# Patient Record
Sex: FEMALE | Race: WHITE | Employment: FULL TIME | URBAN - METROPOLITAN AREA
[De-identification: names, ages, dates, MRNs, and addresses within clinical notes are randomized per-mention and may not be internally consistent; named-entity substitution may affect disease eponyms.]

---

## 2020-06-15 ENCOUNTER — NURSE ONLY (OUTPATIENT)
Dept: OBGYN | Age: 25
End: 2020-06-15
Payer: COMMERCIAL

## 2020-06-15 VITALS
BODY MASS INDEX: 41.83 KG/M2 | HEART RATE: 93 BPM | WEIGHT: 245 LBS | DIASTOLIC BLOOD PRESSURE: 80 MMHG | SYSTOLIC BLOOD PRESSURE: 138 MMHG | TEMPERATURE: 98.4 F | HEIGHT: 64 IN

## 2020-06-15 PROCEDURE — 99211 OFF/OP EST MAY X REQ PHY/QHP: CPT | Performed by: OBSTETRICS & GYNECOLOGY

## 2020-06-15 PROCEDURE — 81025 URINE PREGNANCY TEST: CPT | Performed by: OBSTETRICS & GYNECOLOGY

## 2020-07-14 PROBLEM — O09.91 HIGH-RISK PREGNANCY, FIRST TRIMESTER: Status: ACTIVE | Noted: 2020-07-14

## 2020-07-14 PROBLEM — Z86.79: Status: ACTIVE | Noted: 2020-07-14

## 2020-07-16 ENCOUNTER — TELEPHONE (OUTPATIENT)
Dept: OBGYN CLINIC | Age: 25
End: 2020-07-16

## 2020-07-16 NOTE — TELEPHONE ENCOUNTER
Pt was seen at [facility] for a con of pregnancy. Pt states she is 11 wks. She would like to transfer care to our office. Is that ok?

## 2020-07-23 ENCOUNTER — ROUTINE PRENATAL (OUTPATIENT)
Dept: OBGYN CLINIC | Age: 25
End: 2020-07-23

## 2020-07-23 VITALS
SYSTOLIC BLOOD PRESSURE: 128 MMHG | BODY MASS INDEX: 41.02 KG/M2 | TEMPERATURE: 98.4 F | DIASTOLIC BLOOD PRESSURE: 74 MMHG | WEIGHT: 239 LBS

## 2020-07-23 PROCEDURE — 0502F SUBSEQUENT PRENATAL CARE: CPT | Performed by: OBSTETRICS & GYNECOLOGY

## 2020-07-23 NOTE — PROGRESS NOTES
Fuentes Thibodeaux is a 30 y.o. female 13w2d        OB History    Para Term  AB Living   1             SAB TAB Ectopic Molar Multiple Live Births                    # Outcome Date GA Lbr Agustín/2nd Weight Sex Delivery Anes PTL Lv   1 Current                      Vitals  BP: 128/74  Weight: 239 lb (108.4 kg)      The patient was seen and evaluated. There was Positive fetal movements. No contractions or leakage of fluid. Signs and symptoms of  labor as well as labor were reviewed. The Nuchal Translucency testing was reviewed and found to be declined. A single marker MSAFP was declined for a 15-20 week gestational age window. TOP ST OH Reviewed. Dates were reviewed with the patient. An 18-22 week anatomy ultrasound has been ordered. The patient will return to the office for her next visit in 4 weeks. See antepartum flow sheet. No Patient Care Coordination Note on file. Assessment:  1. Fuentes Thibodeaux is a 30 y.o. female  2.   3. 12w4d    Patient Active Problem List    Diagnosis Date Noted    High-risk pregnancy, first trimester 2020     Intake not completed. Pt desires care with different  [facility] records/ultrasound/labs  from previous provider from [address] [place] [address].  scanned into media. Pt moved from      [facility] history of pulmonic valve stenosis 2020     Dx at 2 and underwent a balloon valvuloplasty at age 1 years, performed by Aicha Gomez at [facility]  In [address] [place] [address]. Most recent visit to peds cardiology scanned into [employer]          Diagnosis Orders   1. High-risk pregnancy, first trimester  Ambulatory referral to Maternal Fetal    Cystic Fibrosis    Drugs of Abuse Scr, Urine    Urinalysis Reflex to Culture   2. 12 weeks gestation of pregnancy  Ambulatory referral to Maternal Fetal    Cystic Fibrosis    Drugs of Abuse Scr, Urine    Urinalysis Reflex to Culture   3.  Personal history of pulmonic valve stenosis  Ambulatory referral to Maternal Fetal    Cystic Fibrosis Drugs of Abuse Scr, Urine    Urinalysis Reflex to Culture         Plan:  [facility] anatomy ultrasound referral sent  CArdiology referral for history of pulmonary stenosis, [facility] referral  RTO 4 weeks

## 2020-08-20 ENCOUNTER — ROUTINE PRENATAL (OUTPATIENT)
Dept: OBGYN CLINIC | Age: 25
End: 2020-08-20

## 2020-08-20 VITALS
DIASTOLIC BLOOD PRESSURE: 74 MMHG | SYSTOLIC BLOOD PRESSURE: 122 MMHG | TEMPERATURE: 97.3 F | BODY MASS INDEX: 40.9 KG/M2 | RESPIRATION RATE: 16 BRPM | WEIGHT: 238.25 LBS

## 2020-08-20 PROCEDURE — 0502F SUBSEQUENT PRENATAL CARE: CPT | Performed by: NURSE PRACTITIONER

## 2020-08-20 NOTE — PATIENT INSTRUCTIONS
Patient Education      Patient Education      After Hours Number: You can call the office [phone]  or [phone]  Call if you have:  1. Bleeding like a period  2. Cramps or contractions greater than 2 hours  3. If you are leaking fluid  4. If you've a fever greater than 100°  5. If you feel as if baby is not moving  6. If you have continuous vomiting over 3-4 hours   Weeks 14 to 18 of Your Pregnancy: Care Instructions  Your Care Instructions     During this time, you may start to \"show,\" so that you look pregnant to people around you. You may also notice some changes in your skin, such as itchy spots on your palms or acne on your face. Your baby is now able to pass urine, and your baby's first stool (meconium) is starting to collect in his or her intestines. Hair is also beginning to grow on your baby's head. At your next visit, between weeks 18 and 20, your doctor may do an ultrasound test. The test allows your doctor to check for certain problems. Your doctor can also tell the sex of your baby. This is a good time to think about whether you want to know whether your baby is a boy or a girl. Talk to your doctor about getting a flu shot to help keep you healthy during your pregnancy. As your pregnancy moves along, it is common to worry or feel anxious. Your body is changing a lot. And you are thinking about giving birth, the health of your baby, and becoming a parent. You can learn to cope with any anxiety and stress you feel. Follow-up care is a key part of your treatment and safety. Be sure to make and go to all appointments, and call your doctor if you are having problems. It's also a good idea to know your test results and keep a list of the medicines you take. How can you care for yourself at home? Reduce stress  · Ask for help with cooking and housekeeping. · Figure out who or what causes your stress. Avoid these people or situations as much as possible. · Relax every day.  Taking 10- to 15-minute breaks can make a big difference. Take a walk, listen to music, or take a warm bath. · Learn relaxation techniques at prenatal or yoga class. Or buy a relaxation tape. · List your fears about having a baby and becoming a parent. Share the list with someone you trust. Decide which worries are really small, and try to let them go. Exercise  · If you did not exercise much before pregnancy, start slowly. Walking is best. Erik Beau yourself, and do a little more every day. · Brisk walking, easy jogging, low-impact aerobics, water aerobics, and yoga are good choices. Some sports, such as scuba diving, horseback riding, downhill skiing, gymnastics, and water skiing, are not a good idea. · Try to do at least 2½ hours a week of moderate exercise, such as a fast walk. One way to do this is to be active 30 minutes a day, at least 5 days a week. It's fine to be active in blocks of 10 minutes or more throughout your day and week. · Wear loose clothing. And wear shoes and a bra that provide good support. · Warm up and cool down to start and finish your exercise. · If you want to use weights, be sure to use light weights. They reduce stress on your joints. Stay at the best weight for you  · Experts recommend that you gain about 1 pound a month during the first 3 months of your pregnancy. · Experts recommend that you gain about 1 pound a week during your last 6 months of pregnancy, for a total weight gain of 25 to 35 pounds. · If you are underweight, you will need to gain more weight (about 28 to 40 pounds). · If you are overweight, you may not need to gain as much weight (about 15 to 25 pounds). · If you are gaining weight too fast, use common sense. Exercise every day, and limit sweets, fast foods, and fats. Choose lean meats, fruits, and vegetables. · If you are having twins or more, your doctor may refer you to a dietitian. Where can you learn more? Go to https://hilda.health-partners. org and sign in to your [employer] account. Enter Y333 in the [facility] box to learn more about \"Weeks 14 to 18 of Your Pregnancy: Care Instructions. \"     If you do not have an account, please click on the \"Sign Up Now\" link. Current as of: February 11, 2020               Content Version: 12.5  © 4302-7864 [employer]. Care instructions adapted under license by [employer]. If you have questions about a medical condition or this instruction, always ask your healthcare professional. [address] any warranty or liability for your use of this information. Second-Trimester Fetal Ultrasound: About This Test  What is it? Fetal ultrasound is a test that uses sound waves to make pictures of your baby (fetus) and placenta inside the uterus. The test is the safest way to find out the age, size, and position of your baby. You also may be able to find out the sex of your baby. (But the test isn't done just to find out a baby's sex.)  No known risks to the mother or the baby are linked to fetal ultrasound. But you may feel anxious if the test reveals a problem with your pregnancy or baby. Why is this test done? In the second trimester, a fetal ultrasound is done to:  · Estimate the number of weeks and days a fetus has developed since the beginning of the pregnancy. This is called the gestational age. · Look at the size and position of the fetus, the placenta, and the fluid that surrounds the fetus. · Find major birth defects, such as heart problems or problems with the brain and spinal cord (neural tube defects). But the test may not be able to find many minor defects and some major birth defects. How do you prepare for the test?  In general, there's nothing you have to do before this test, unless your doctor tells you to. How is the test done? · You may be able to leave your clothes on, or you will be given a gown to wear.   · You will lie on your back on a padded examination table. · A gel will be spread on your belly. It will be removed after the test.  · A small, handheld device called a transducer will be pressed against the gel on your skin and moved across your belly several times. · You may watch the monitor to see the picture of your baby during the test.  What happens after the test?  · You will probably be able to go home right away. · You most likely will be able to go back to your usual activities right away. Follow-up care is a key part of your treatment and safety. Be sure to make and go to all appointments, and call your doctor if you are having problems. It's also a good idea to keep a list of the medicines you take. Ask your doctor when you can expect to have your test results. Where can you learn more? Go to https://chpetierneyeb.RidePost. org and sign in to your [employer] account. Enter W743 in the [facility] box to learn more about \"Second-Trimester Fetal Ultrasound: About This Test.\"     If you do not have an account, please click on the \"Sign Up Now\" link. Current as of: February 11, 2020               Content Version: 12.5  © 3631-5778 Healthwise, Incorporated. Care instructions adapted under license by [facility] ([facility]). If you have questions about a medical condition or this instruction, always ask your healthcare professional. [address] any warranty or liability for your use of this information.

## 2020-08-20 NOTE — PROGRESS NOTES
Presents for OB visit  Gestation 16w4d  Estimated Date of Delivery: 21    MFM anatomy scheduled, will need fetal echocardiogram  Cardio referral, history of pulmonary stenosis  Blood Type A negative      The patient was seen and evaluated. The Nuchal Translucency testing was reviewed and found to be declined. A single marker MSAFP was declined for a 15-20 week gestational age window. TOP ST [place] Reviewed. Dates were reviewed with the patient. An 18-22 week anatomy ultrasound has been ordered and scheduled 20. The patient will return to the office for her next visit in 4 weeks. See antepartum flow sheet. OB History    Para Term  AB Living   1             SAB TAB Ectopic Molar Multiple Live Births                    # Outcome Date GA Lbr Agustín/2nd Weight Sex Delivery Anes PTL Lv   1 Current                     No Known Allergies  Current Outpatient Medications   Medication Sig Dispense Refill    Prenatal Vit-Fe Fumarate-FA (PRENATAL PO) Take by mouth       No current facility-administered medications for this visit. Past Medical History:   Diagnosis Date    Pulmonary valve stenosis     age 1        Past Surgical History:   Procedure Laterality Date    CARDIAC CATHETERIZATION      x3 different     CARDIAC VALVE SURGERY      age 1- Balloon valvuoplasty     VULVA SURGERY      x2      Headaches: no  Swelling: no  Bleeding: no  Discharge: no   Dysuria: no  Nausea: no  Heartburn: no  Epigastric pain: no  Contractions: no  Leakage of fluid: no  + fetal movement       Return 4 WEEKS    Labs as indicated n/a    PTL signs reviewed, if indicated  Kick Count Instructions given if indicated: The patient was instructed on fetal kick counts and was given a kick sheet to complete every 8 hours. This is to begin at 28 weeks gestation.  She was instructed that the baby should move at a minimum of ten times within one hour after a meal. The patient was instructed to lay down on her left side twenty minutes after eating and count movements for up to one hour with a target value of ten movements. She was instructed to notify the office if she did not make that target after two attempts or if after any attempt there was less than four movements. After hour numbers reviewed , along with labor signs if indicated. Contractions/cramping between 5-7 minutes and persisting even with attempts of increased water and laying on side. Fluid leakage, bleeding  NST information given no    The patient was counseled on the mandatory call ahead policy. She has been instructed to call the office at anytime prior to going into the hospital so the on-call physician may direct her to the appropriate facility for care. Exceptions were reviewed including but not limited to: Decreased fetal movement, vaginal Bleeding or hemorrhage, trauma, readily expectant delivery, or any instance where she feels 911 should be utilized. Of the 15 minute duration appointment visit, Topher Shi CNP spent at least 50% of the face-to-face time in counseling, explanation of diagnosis, planning of further management, and answering all questions.

## 2020-08-27 ENCOUNTER — TELEPHONE (OUTPATIENT)
Dept: OBGYN CLINIC | Age: 25
End: 2020-08-27

## 2020-08-27 NOTE — TELEPHONE ENCOUNTER
Patient is 17 wk IUP and slipped on her stairs this am.   Was on her back and slid down about 6-7 stairs. Informed patient to watch for any spotting, but since no contact with abdomen, unlikely that will occur. Usually back muscles and maybe shoulder muscles if tried to reach for railing or pulled on railing. Warm compresses,  a shower w/warm water to area, or heating pad on low if needed. Tylenol ok. Phone office if any further sx or spotting does occur.

## 2020-09-14 ENCOUNTER — ROUTINE PRENATAL (OUTPATIENT)
Dept: PERINATAL CARE | Age: 25
End: 2020-09-14
Payer: COMMERCIAL

## 2020-09-14 ENCOUNTER — TELEPHONE (OUTPATIENT)
Dept: OBGYN CLINIC | Age: 25
End: 2020-09-14

## 2020-09-14 VITALS
SYSTOLIC BLOOD PRESSURE: 134 MMHG | DIASTOLIC BLOOD PRESSURE: 77 MMHG | RESPIRATION RATE: 16 BRPM | HEART RATE: 108 BPM | HEIGHT: 64 IN | WEIGHT: 248 LBS | BODY MASS INDEX: 42.34 KG/M2 | TEMPERATURE: 97.1 F

## 2020-09-14 PROCEDURE — 99243 OFF/OP CNSLTJ NEW/EST LOW 30: CPT | Performed by: OBSTETRICS & GYNECOLOGY

## 2020-09-14 PROCEDURE — 76817 TRANSVAGINAL US OBSTETRIC: CPT | Performed by: OBSTETRICS & GYNECOLOGY

## 2020-09-14 PROCEDURE — 76811 OB US DETAILED SNGL FETUS: CPT | Performed by: OBSTETRICS & GYNECOLOGY

## 2020-09-21 ENCOUNTER — ROUTINE PRENATAL (OUTPATIENT)
Dept: OBGYN CLINIC | Age: 25
End: 2020-09-21

## 2020-09-21 VITALS — BODY MASS INDEX: 42.53 KG/M2 | DIASTOLIC BLOOD PRESSURE: 64 MMHG | WEIGHT: 247.8 LBS | SYSTOLIC BLOOD PRESSURE: 116 MMHG

## 2020-09-21 PROCEDURE — 0502F SUBSEQUENT PRENATAL CARE: CPT | Performed by: OBSTETRICS & GYNECOLOGY

## 2020-09-21 NOTE — PROGRESS NOTES
 Personal history of pulmonic valve stenosis 2020     Dx at 2 and underwent a balloon valvuloplasty at age 1 years, performed by Christiana Heath at [facility]  In [facility]. Most recent visit to peds cardiology scanned into [facility]          Diagnosis Orders   1. High-risk pregnancy in second trimester     2. 21 weeks gestation of pregnancy           Plan:  The patient will return to the office for her next visit in 4 weeks. See antepartum flow sheet. Counseled on s/s of preeclampsia. Counseled on s/s of  labor. Vasa Previa, counseled on risk and monitoring. Counseled on recommended delivery times. Cardiology referral scheduled. Patient was seen with total face to face time of 15 minutes. More than 50% of this visit was on counseling and education regarding her    Diagnosis Orders   1. High-risk pregnancy in second trimester     2. 21 weeks gestation of pregnancy      and her options. She was also counseled on her preventative health maintenance recommendations and follow-up.

## 2020-09-28 ENCOUNTER — TELEPHONE (OUTPATIENT)
Dept: OBGYN CLINIC | Age: 25
End: 2020-09-28

## 2020-09-28 RX ORDER — BREAST PUMP
EACH MISCELLANEOUS
Qty: 1 EACH | Refills: 0 | Status: SHIPPED | OUTPATIENT
Start: 2020-09-28 | End: 2020-09-29

## 2020-09-29 ENCOUNTER — TELEPHONE (OUTPATIENT)
Dept: OBGYN CLINIC | Age: 25
End: 2020-09-29

## 2020-09-29 RX ORDER — BREAST PUMP
EACH MISCELLANEOUS
Qty: 1 EACH | Refills: 0 | Status: SHIPPED | OUTPATIENT
Start: 2020-09-29 | End: 2022-01-04

## 2020-09-29 NOTE — TELEPHONE ENCOUNTER
needs the rx for her breast pump to be Abiodun Arredondo and not Amadou Mars.   Then the rx needs to be faxed to [phone]  Jason Do

## 2020-10-20 ENCOUNTER — ROUTINE PRENATAL (OUTPATIENT)
Dept: OBGYN CLINIC | Age: 25
End: 2020-10-20

## 2020-10-20 VITALS — SYSTOLIC BLOOD PRESSURE: 116 MMHG | DIASTOLIC BLOOD PRESSURE: 64 MMHG | BODY MASS INDEX: 43.51 KG/M2 | WEIGHT: 253.5 LBS

## 2020-10-20 PROCEDURE — 0502F SUBSEQUENT PRENATAL CARE: CPT | Performed by: NURSE PRACTITIONER

## 2020-10-20 NOTE — PATIENT INSTRUCTIONS
After Hours Number: You can call the office [phone]  or [phone]  Call if you have:  1. Bleeding like a period  2. Cramps or contractions greater than 2 hours  3. If you are leaking fluid  4. If you've a fever greater than 100°  5. If you feel as if baby is not moving  6. If you have continuous vomiting over 3-4 hours   Counting Your Baby's Kicks: Care Instructions  Your Care Instructions    Counting your baby's kicks is one way your doctor can tell that your baby is healthy. Most women--especially in a first pregnancy--feel their baby move for the first time between 16 and 22 weeks. The movement may feel like flutters rather than kicks. Your baby may move more at certain times of the day. When you are active, you may notice less kicking than when you are resting. At your prenatal visits, your doctor will ask whether the baby is active. In your last trimester, your doctor may ask you to count the number of times you feel your baby move. Follow-up care is a key part of your treatment and safety. Be sure to make and go to all appointments, and call your doctor if you are having problems. It's also a good idea to know your test results and keep a list of the medicines you take. How do you count fetal kicks? · A common method of checking your baby's movement is to count the number of kicks or moves you feel in 1 hour. Ten movements (such as kicks, flutters, or rolls) in 1 hour are normal. Some doctors suggest that you count in the morning until you get to 10 movements. Then you can quit for that day and start again the next day. · Pick your baby's most active time of day to count. This may be any time from morning to evening. · If you do not feel 10 movements in an hour, your baby may be sleeping. Wait for the next hour and count again. When should you call for help?   Call your doctor now or seek immediate medical care if:    · You noticed that your baby has stopped moving or is moving much less than normal.    Watch closely for changes in your health, and be sure to contact your doctor if you have any problems. Where can you learn more? Go to https://chpepiceweb.O2Gen Solutions. org and sign in to your [employer] account. Enter B745 in the [employer] box to learn more about \"Counting Your Baby's Kicks: Care Instructions. \"     If you do not have an account, please click on the \"Sign Up Now\" link. Current as of: September 5, 2018  Content Version: 12.0  © 1740-5779 [employer]. Care instructions adapted under license by [facility] ([facility]). If you have questions about a medical condition or this instruction, always ask your healthcare professional. [address] any warranty or liability for your use of this information. Preeclampsia: Care Instructions  Overview    Preeclampsia occurs when a woman's blood pressure rises during pregnancy. Often with preeclampsia, you also have swelling in your legs, hands, and face. A test may show too much protein in your urine. Preeclampsia is also called toxemia. If preeclampsia is severe and not treated, it can lead to seizures (eclampsia) and damage to your liver or kidneys. Preeclampsia can prevent your baby from getting enough food and oxygen. This can cause a low birth weight or other problems. Your doctor will watch you closely to prevent these problems. He or she also may recommend that you reduce your activity. If your preeclampsia is a danger to your health or the health of your baby, your doctor may need to deliver your baby early. While preeclampsia is a concern, most women with preeclampsia have healthy babies. After a woman gives birth, preeclampsia usually goes away on its own. But symptoms may last a few weeks or more and can get worse after delivery. Rarely, symptoms of preeclampsia don't show up until days or even weeks after childbirth. Follow-up care is a key part of your treatment and safety.  Be sure to make and go to all appointments, and call your doctor if you are having problems. It's also a good idea to know your test results and keep a list of the medicines you take. How can you care for yourself at home? · Take and record your blood pressure at home if your doctor tells you to. ? Learn the importance of the two measures of blood pressure (such as 120 over 80, or 120/80). The first number is the systolic pressure, which is the force of blood on the artery walls as the heart pumps. The second number is the diastolic pressure, which is the force of blood on the artery walls between heartbeats, when the heart is at rest. You have a choice of monitors to use. ? Manual monitor: You pump up the cuff and use a stethoscope to listen for your pulse. ? Electronic monitor: The cuff inflates, and a gauge shows your pulse rate. ? To take your blood pressure:  ? Ask your doctor to check your blood pressure monitor to be sure that it is accurate and that the cuff fits you. Also ask your doctor to watch you to make sure that you are using it right. ? You should not eat, use tobacco products, or use medicine known to raise blood pressure (such as some nasal decongestant sprays) before you take your blood pressure. ? Avoid taking your blood pressure if you have just exercised. Also avoid taking it if you are nervous or upset. Rest at least 15 minutes before you take your blood pressure. · If your doctor advises, check the protein levels in your urine. Your doctor or nurse will show you how to do this. · Take your medicines exactly as prescribed. Call your doctor if you think you are having a problem with your medicine. · Do not smoke. Quitting smoking will help improve your baby's growth and health. If you need help quitting, talk to your doctor about stop-smoking programs and medicines. These can increase your chances of quitting for good.   · Eat a balanced and healthy diet that has lots of fruits and vegetables. · If your doctor advised bed rest, be sure to stay off your feet and rest as much as possible. ? Keep a phone, phone book, notepad, and pen near the bed where you can easily reach them. ? Gently stretch your legs every hour to maintain good blood flow. ? Have another family member pack snacks and lunch food in a cooler close to your bed. ? Use this time for activities that you usually cannot find time for, such as reading, craft projects, or letter writing. · You can keep track of your baby's health by noting the length of time it takes to count 10 movements (such as kicks, flutters, or rolls). Feeling 10 movements in less than 1 hour is considered normal. Track your baby's movements once each day. Bring this record with you to each prenatal visit. When should you call for help? Call 911 anytime you think you may need emergency care. For example, call if:    · You passed out (lost consciousness). · You have a seizure. Call your doctor now or seek immediate medical care if:    · You have symptoms of preeclampsia, such as:  ? Sudden swelling of your face, hands, or feet. ? New vision problems (such as dimness or blurring). ? A severe headache. · Your blood pressure is higher than it should be, or it rises suddenly. · You have new nausea or vomiting. · You think that you are in labor. · You have pain in your belly or pelvis. Watch closely for changes in your health, and be sure to contact your doctor if:    · You gain weight rapidly. Where can you learn more? Go to https://KoolConnect TechnologiesronNewCondosOnline.Taltopia. org and sign in to your [employer] account. Enter L662 in the [employer] box to learn more about \"Preeclampsia: Care Instructions. \"     If you do not have an account, please click on the \"Sign Up Now\" link. Current as of: September 5, 2018  Content Version: 12.0  © 5100-6975 Healthwise, Incorporated. Care instructions adapted under license by [facility] ([facility]).  If instructions adapted under license by [facility] ([facility]). If you have questions about a medical condition or this instruction, always ask your healthcare professional. [address] any warranty or liability for your use of this information.

## 2020-10-20 NOTE — PROGRESS NOTES
Presents for OB visit  Gestation 25w2d  Estimated Date of Delivery: 21    MFM anatomy scheduled, will need fetal echocardiogram  Cardio referral, history of pulmonary stenosis  Blood Type A negative  Vasa Previa - delivery 36 weeks via , inpatient management 30-32wks if persists  Cardiology referral placed - appt scheduled                  OB History    Para Term  AB Living   1             SAB TAB Ectopic Molar Multiple Live Births                    # Outcome Date GA Lbr Agustín/2nd Weight Sex Delivery Anes PTL Lv   1 Current                     No Known Allergies  Current Outpatient Medications   Medication Sig Dispense Refill    Misc. Devices (BREAST PUMP) MISC Double electric breast pump 1 each 0    Prenatal Vit-Fe Fumarate-FA (PRENATAL PO) Take by mouth       No current facility-administered medications for this visit. Past Medical History:   Diagnosis Date    Pulmonary valve stenosis     age 1        Past Surgical History:   Procedure Laterality Date    CARDIAC CATHETERIZATION      x3 different     CARDIAC VALVE SURGERY      age 1- Balloon valvuoplasty     VULVA SURGERY      x2      Headaches: no  Swelling: no  Bleeding: no  Discharge: no   Dysuria: no  Nausea: no  Heartburn: no  Epigastric pain: no  Contractions: no  Leakage of fluid: no  + fetal movement       Return 4 WEEKS    Labs as indicated cbc, antibody, 1 hr gtt, ua    PTL signs reviewed, if indicated  Kick Count Instructions given if indicated: The patient was instructed on fetal kick counts and was given a kick sheet to complete every 8 hours. This is to begin at 28 weeks gestation. She was instructed that the baby should move at a minimum of ten times within one hour after a meal. The patient was instructed to lay down on her left side twenty minutes after eating and count movements for up to one hour with a target value of ten movements.    She was instructed to notify the office if she did not make that target after two attempts or if after any attempt there was less than four movements. After hour numbers reviewed , along with labor signs if indicated. Contractions/cramping between 5-7 minutes and persisting even with attempts of increased water and laying on side. Fluid leakage, bleeding  NST information given no    The patient was counseled on the mandatory call ahead policy. She has been instructed to call the office at anytime prior to going into the hospital so the on-call physician may direct her to the appropriate facility for care. Exceptions were reviewed including but not limited to: Decreased fetal movement, vaginal Bleeding or hemorrhage, trauma, readily expectant delivery, or any instance where she feels 911 should be utilized. Of the 15 minute duration appointment visit, Qunyh Dodge CNP spent at least 50% of the face-to-face time in counseling, explanation of diagnosis, planning of further management, and answering all questions.

## 2020-10-26 ENCOUNTER — ROUTINE PRENATAL (OUTPATIENT)
Dept: PERINATAL CARE | Age: 25
End: 2020-10-26
Payer: COMMERCIAL

## 2020-10-26 VITALS
TEMPERATURE: 97.2 F | HEART RATE: 100 BPM | WEIGHT: 254 LBS | DIASTOLIC BLOOD PRESSURE: 73 MMHG | RESPIRATION RATE: 16 BRPM | BODY MASS INDEX: 43.36 KG/M2 | HEIGHT: 64 IN | SYSTOLIC BLOOD PRESSURE: 130 MMHG

## 2020-10-26 PROCEDURE — 76817 TRANSVAGINAL US OBSTETRIC: CPT | Performed by: OBSTETRICS & GYNECOLOGY

## 2020-10-26 PROCEDURE — 76825 ECHO EXAM OF FETAL HEART: CPT | Performed by: OBSTETRICS & GYNECOLOGY

## 2020-10-26 PROCEDURE — 76816 OB US FOLLOW-UP PER FETUS: CPT | Performed by: OBSTETRICS & GYNECOLOGY

## 2020-10-26 PROCEDURE — 76820 UMBILICAL ARTERY ECHO: CPT | Performed by: OBSTETRICS & GYNECOLOGY

## 2020-10-26 PROCEDURE — 93325 DOPPLER ECHO COLOR FLOW MAPG: CPT | Performed by: OBSTETRICS & GYNECOLOGY

## 2020-10-26 PROCEDURE — 76827 ECHO EXAM OF FETAL HEART: CPT | Performed by: OBSTETRICS & GYNECOLOGY

## 2020-10-26 PROCEDURE — 76821 MIDDLE CEREBRAL ARTERY ECHO: CPT | Performed by: OBSTETRICS & GYNECOLOGY

## 2020-10-26 RX ORDER — ASPIRIN 81 MG/1
81 TABLET ORAL DAILY
Status: ON HOLD | COMMUNITY
Start: 2020-09-15 | End: 2020-12-28 | Stop reason: HOSPADM

## 2020-11-09 ENCOUNTER — HOSPITAL ENCOUNTER (OUTPATIENT)
Age: 25
Setting detail: SPECIMEN
Discharge: HOME | End: 2020-11-09
Payer: COMMERCIAL

## 2020-11-09 LAB
ABSOLUTE EOS #: 0.07 K/UL (ref 0–0.44)
ABSOLUTE IMMATURE GRANULOCYTE: 0.07 K/UL (ref 0–0.3)
ABSOLUTE LYMPH #: 1.9 K/UL (ref 1.1–3.7)
ABSOLUTE MONO #: 0.55 K/UL (ref 0.1–1.2)
AMPHETAMINE SCREEN URINE: NEGATIVE
ANTIBODY SCREEN: NEGATIVE
BARBITURATE SCREEN URINE: NEGATIVE
BASOPHILS # BLD: 0 % (ref 0–2)
BASOPHILS ABSOLUTE: 0.03 K/UL (ref 0–0.2)
BENZODIAZEPINE SCREEN, URINE: NEGATIVE
BILIRUBIN URINE: NEGATIVE
BUPRENORPHINE URINE: NORMAL
CANNABINOID SCREEN URINE: NEGATIVE
COCAINE METABOLITE, URINE: NEGATIVE
COLOR: YELLOW
COMMENT UA: NORMAL
DIFFERENTIAL TYPE: ABNORMAL
EOSINOPHILS RELATIVE PERCENT: 1 % (ref 1–4)
GLUCOSE ADMINISTRATION: NORMAL
GLUCOSE TOLERANCE SCREEN 50G: 105 MG/DL (ref 70–135)
GLUCOSE URINE: NEGATIVE
HCT VFR BLD CALC: 36.3 % (ref 36.3–47.1)
HEMOGLOBIN: 11.5 G/DL (ref 11.9–15.1)
IMMATURE GRANULOCYTES: 1 %
KETONES, URINE: NEGATIVE
LEUKOCYTE ESTERASE, URINE: NEGATIVE
LYMPHOCYTES # BLD: 19 % (ref 24–43)
MCH RBC QN AUTO: 29.9 PG (ref 25.2–33.5)
MCHC RBC AUTO-ENTMCNC: 31.7 G/DL (ref 28.4–34.8)
MCV RBC AUTO: 94.5 FL (ref 82.6–102.9)
MDMA URINE: NORMAL
METHADONE SCREEN, URINE: NEGATIVE
METHAMPHETAMINE, URINE: NORMAL
MONOCYTES # BLD: 5 % (ref 3–12)
NITRITE, URINE: NEGATIVE
NRBC AUTOMATED: 0 PER 100 WBC
OPIATES, URINE: NEGATIVE
OXYCODONE SCREEN URINE: NEGATIVE
PDW BLD-RTO: 13.2 % (ref 11.8–14.4)
PH UA: 7 (ref 5–8)
PHENCYCLIDINE, URINE: NEGATIVE
PLATELET # BLD: 256 K/UL (ref 138–453)
PLATELET ESTIMATE: ABNORMAL
PMV BLD AUTO: 10.5 FL (ref 8.1–13.5)
PROPOXYPHENE, URINE: NORMAL
PROTEIN UA: NEGATIVE
RBC # BLD: 3.84 M/UL (ref 3.95–5.11)
RBC # BLD: ABNORMAL 10*6/UL
SEG NEUTROPHILS: 74 % (ref 36–65)
SEGMENTED NEUTROPHILS ABSOLUTE COUNT: 7.64 K/UL (ref 1.5–8.1)
SPECIFIC GRAVITY UA: 1.01 (ref 1–1.03)
TEST INFORMATION: NORMAL
TRICYCLIC ANTIDEPRESSANTS, UR: NORMAL
TURBIDITY: CLEAR
URINE HGB: NEGATIVE
UROBILINOGEN, URINE: NORMAL
WBC # BLD: 10.3 K/UL (ref 3.5–11.3)
WBC # BLD: ABNORMAL 10*3/UL

## 2020-11-11 LAB — CYSTIC FIBROSIS: NORMAL

## 2020-11-13 ENCOUNTER — ROUTINE PRENATAL (OUTPATIENT)
Dept: OBGYN CLINIC | Age: 25
End: 2020-11-13
Payer: COMMERCIAL

## 2020-11-13 VITALS — DIASTOLIC BLOOD PRESSURE: 62 MMHG | BODY MASS INDEX: 44.29 KG/M2 | SYSTOLIC BLOOD PRESSURE: 110 MMHG | WEIGHT: 258 LBS

## 2020-11-13 PROCEDURE — 96372 THER/PROPH/DIAG INJ SC/IM: CPT | Performed by: OBSTETRICS & GYNECOLOGY

## 2020-11-13 PROCEDURE — 90471 IMMUNIZATION ADMIN: CPT | Performed by: OBSTETRICS & GYNECOLOGY

## 2020-11-13 PROCEDURE — 90686 IIV4 VACC NO PRSV 0.5 ML IM: CPT | Performed by: OBSTETRICS & GYNECOLOGY

## 2020-11-13 PROCEDURE — 0502F SUBSEQUENT PRENATAL CARE: CPT | Performed by: OBSTETRICS & GYNECOLOGY

## 2020-11-13 NOTE — PROGRESS NOTES
Rhogam injection given right gluteal, patient tolerated well. NFW:[id number] EXP:5/16/2022    The patient requested to have the Flu vaccine. Consent obtained. Injection of 0.5mL given Right deltoid Intramuscular. Lot #:[id number]  MXY:4/03/7915  Patient tolerated well.

## 2020-11-16 NOTE — PROGRESS NOTES
Grace Arredondo is a 30 y.o. female 30w6d        OB History    Para Term  AB Living   1             SAB TAB Ectopic Molar Multiple Live Births                    # Outcome Date GA Lbr Agustín/2nd Weight Sex Delivery Anes PTL Lv   1 Current                Vitals  BP: 110/62  Weight: 258 lb (117 kg)  Patient Position: Sitting  Albumin: Negative  Glucose: Negative  Fetal Heart Rate: 152  Movement: Present    + FM  NO VB   NO LOF  NO CTX  Pt Rh negative - received RhoGam today in office  Desires Flu and TDAP as well    MFM anatomy scheduled, will need fetal echocardiogram  Cardio referral, history of pulmonary stenosis  Blood Type A negative  Vasa Previa - delivery 36 weeks via , inpatient management 30-32wks if persists  Cardiology referral placed - appt scheduled  Flu vaccine given right deltoid -SC  Rhogam injection right Gluteal -SC      Assessment:  1Gracy Cummins is a 30 y.o. female  2.   3. 28w5d    Patient Active Problem List    Diagnosis Date Noted    High-risk pregnancy, first trimester 2020     Intake not completed. Pt desires care with different  [facility] records/ultrasound/labs  from previous provider from [address] [place] [address].  scanned into media. Pt moved from      [facility] history of pulmonic valve stenosis 2020     Dx at 2 and underwent a balloon valvuloplasty at age 1 years, performed by Julia Figueroa at [facility]  In [address] [place] [address]. Most recent visit to peds cardiology scanned into media-SK          Diagnosis Orders   1. High-risk pregnancy in third  trimester  INFLUENZA, QUADV, 3 YRS AND OLDER, IM PF, PREFILL SYR OR SDV, 0.5ML (AFLURIA QUADV, PF)    MS INJECTION,THERAP/PROPH/DIAGNOST, IM OR SUBCUT   2. 28 weeks gestation of pregnancy     3.  Rh negative state in antepartum period, third trimester  Rho(D) immune globulin (RhoGAM) injection only    rho(D) immune globulin (HYPERRHO S/D) injection 300 mcg             Plan:  The patient will return to the office for her next visit in 2 weeks. See antepartum flow sheet.

## 2020-11-23 ENCOUNTER — ROUTINE PRENATAL (OUTPATIENT)
Dept: PERINATAL CARE | Age: 25
End: 2020-11-23
Payer: COMMERCIAL

## 2020-11-23 VITALS
DIASTOLIC BLOOD PRESSURE: 70 MMHG | HEIGHT: 64 IN | RESPIRATION RATE: 16 BRPM | HEART RATE: 96 BPM | WEIGHT: 260 LBS | SYSTOLIC BLOOD PRESSURE: 122 MMHG | BODY MASS INDEX: 44.39 KG/M2 | TEMPERATURE: 97.2 F

## 2020-11-23 LAB
ABDOMINAL CIRCUMFERENCE: NORMAL
BIPARIETAL DIAMETER: NORMAL
ESTIMATED FETAL WEIGHT: NORMAL
FEMORAL DIAMETER: NORMAL
HC/AC: NORMAL
HEAD CIRCUMFERENCE: NORMAL

## 2020-11-23 PROCEDURE — 76817 TRANSVAGINAL US OBSTETRIC: CPT | Performed by: OBSTETRICS & GYNECOLOGY

## 2020-11-23 PROCEDURE — 99215 OFFICE O/P EST HI 40 MIN: CPT | Performed by: OBSTETRICS & GYNECOLOGY

## 2020-11-23 PROCEDURE — 76805 OB US >/= 14 WKS SNGL FETUS: CPT | Performed by: OBSTETRICS & GYNECOLOGY

## 2020-11-23 PROCEDURE — 76820 UMBILICAL ARTERY ECHO: CPT | Performed by: OBSTETRICS & GYNECOLOGY

## 2020-11-23 PROCEDURE — 76819 FETAL BIOPHYS PROFIL W/O NST: CPT | Performed by: OBSTETRICS & GYNECOLOGY

## 2020-11-23 PROCEDURE — 76821 MIDDLE CEREBRAL ARTERY ECHO: CPT | Performed by: OBSTETRICS & GYNECOLOGY

## 2020-11-24 ENCOUNTER — HOSPITAL ENCOUNTER (OUTPATIENT)
Age: 25
Discharge: HOME | End: 2020-11-24
Payer: COMMERCIAL

## 2020-11-24 ENCOUNTER — ROUTINE PRENATAL (OUTPATIENT)
Dept: OBGYN CLINIC | Age: 25
End: 2020-11-24
Payer: COMMERCIAL

## 2020-11-24 VITALS — SYSTOLIC BLOOD PRESSURE: 110 MMHG | DIASTOLIC BLOOD PRESSURE: 60 MMHG | BODY MASS INDEX: 44.8 KG/M2 | WEIGHT: 261 LBS

## 2020-11-24 LAB
SEND OUT REPORT: NORMAL
TEST NAME: NORMAL

## 2020-11-24 PROCEDURE — 90471 IMMUNIZATION ADMIN: CPT | Performed by: OBSTETRICS & GYNECOLOGY

## 2020-11-24 PROCEDURE — 90715 TDAP VACCINE 7 YRS/> IM: CPT | Performed by: OBSTETRICS & GYNECOLOGY

## 2020-11-24 PROCEDURE — 36415 COLL VENOUS BLD VENIPUNCTURE: CPT

## 2020-11-24 PROCEDURE — 0502F SUBSEQUENT PRENATAL CARE: CPT | Performed by: OBSTETRICS & GYNECOLOGY

## 2020-11-26 NOTE — PROGRESS NOTES
Jay Arredondo is a 30 y.o. female 27w4d        OB History    Para Term  AB Living   1             SAB TAB Ectopic Molar Multiple Live Births                    # Outcome Date GA Lbr Agustín/2nd Weight Sex Delivery Anes PTL Lv   1 Current                Vitals  BP: 110/60  Weight: 261 lb (118.4 kg)  Patient Position: Sitting  Albumin: Negative  Glucose: Negative  Fetal Heart Rate: 146  Movement: Present    + FM  NO VB  NO LOF  NO CTX    MFM anatomy scheduled, will need fetal echocardiogram  Cardio referral, history of pulmonary stenosis  Blood Type A negative  Vasa Previa - delivery 36 weeks via , inpatient management 30-32wks if persists  Cardiology referral placed - appt scheduled  Flu vaccine given right deltoid -SC  Rhogam injection right Gluteal -SC  Tdap given 20 A Fletcher      Assessment:  1Gracy Aponte is a 30 y.o. female  2.   3. 30w2d    Patient Active Problem List    Diagnosis Date Noted    High-risk pregnancy, first trimester 2020     Intake not completed. Pt desires care with different  [facility] records/ultrasound/labs  from previous provider from [address] [place] [address].  scanned into media. Pt moved from      [facility] history of pulmonic valve stenosis 2020     Dx at 2 and underwent a balloon valvuloplasty at age 1 years, performed by Madhav Proctor at [facility]  In [address] [place] [address]. Most recent visit to peds cardiology scanned into [facility]          Diagnosis Orders   1. Rh negative state in antepartum period, third trimester     2. High-risk pregnancy in third  trimester     3. 30 weeks gestation of pregnancy  AL INJECTION,THERAP/PROPH/DIAGNOST, IM OR SUBCUT    Tdap (age 6y and older) IM ([address])   4. Need for Tdap vaccination  AL INJECTION,THERAP/PROPH/DIAGNOST, IM OR SUBCUT    Tdap (age 6y and older) IM ([address])             Plan:  The patient will return to the office for her next visit in 6 weeks postpartum.     Patient scheduled for inpatient stay

## 2020-11-29 PROBLEM — O43.199 MARGINAL INSERTION OF UMBILICAL CORD AFFECTING MANAGEMENT OF MOTHER: Status: ACTIVE | Noted: 2020-11-29

## 2020-11-29 PROBLEM — O99.210 OBESITY AFFECTING PREGNANCY, ANTEPARTUM: Status: ACTIVE | Noted: 2020-11-29

## 2020-11-29 PROBLEM — O09.93 HIGH-RISK PREGNANCY IN THIRD TRIMESTER: Status: ACTIVE | Noted: 2020-11-29

## 2020-11-29 PROBLEM — R03.0 ELEVATED BLOOD PRESSURE READING: Status: ACTIVE | Noted: 2020-11-29

## 2020-12-01 ENCOUNTER — ROUTINE PRENATAL (OUTPATIENT)
Dept: PERINATAL CARE | Age: 25
End: 2020-12-01
Payer: COMMERCIAL

## 2020-12-01 ENCOUNTER — HOSPITAL ENCOUNTER (INPATIENT)
Age: 25
LOS: 29 days | Discharge: HOME | End: 2020-12-30
Attending: OBSTETRICS & GYNECOLOGY | Admitting: OBSTETRICS & GYNECOLOGY
Payer: COMMERCIAL

## 2020-12-01 VITALS
BODY MASS INDEX: 44.73 KG/M2 | TEMPERATURE: 97.2 F | HEART RATE: 92 BPM | WEIGHT: 262 LBS | DIASTOLIC BLOOD PRESSURE: 82 MMHG | RESPIRATION RATE: 16 BRPM | HEIGHT: 64 IN | SYSTOLIC BLOOD PRESSURE: 130 MMHG

## 2020-12-01 DIAGNOSIS — Z98.890 POST-OPERATIVE STATE: Primary | ICD-10-CM

## 2020-12-01 PROBLEM — R89.9 ABNORMAL LABORATORY TEST: Status: ACTIVE | Noted: 2020-12-01

## 2020-12-01 PROBLEM — Z3A.31 31 WEEKS GESTATION OF PREGNANCY: Status: ACTIVE | Noted: 2020-12-01

## 2020-12-01 LAB
ABO/RH: NORMAL
ABSOLUTE EOS #: 0.04 K/UL (ref 0–0.44)
ABSOLUTE IMMATURE GRANULOCYTE: 0.12 K/UL (ref 0–0.3)
ABSOLUTE LYMPH #: 1.85 K/UL (ref 1.1–3.7)
ABSOLUTE MONO #: 0.58 K/UL (ref 0.1–1.2)
AMPHETAMINE SCREEN URINE: NEGATIVE
ANTIBODY IDENTIFICATION: NORMAL
ANTIBODY SCREEN: POSITIVE
ARM BAND NUMBER: NORMAL
BARBITURATE SCREEN URINE: NEGATIVE
BASOPHILS # BLD: 0 % (ref 0–2)
BASOPHILS ABSOLUTE: 0.04 K/UL (ref 0–0.2)
BENZODIAZEPINE SCREEN, URINE: NEGATIVE
BILIRUBIN URINE: NEGATIVE
BUPRENORPHINE URINE: NORMAL
CANNABINOID SCREEN URINE: NEGATIVE
COCAINE METABOLITE, URINE: NEGATIVE
COLOR: YELLOW
COMMENT UA: NORMAL
DIFFERENTIAL TYPE: ABNORMAL
EOSINOPHILS RELATIVE PERCENT: 0 % (ref 1–4)
EXPIRATION DATE: NORMAL
GLUCOSE URINE: NEGATIVE
HCT VFR BLD CALC: 34.3 % (ref 36.3–47.1)
HEMOGLOBIN: 11 G/DL (ref 11.9–15.1)
IMMATURE GRANULOCYTES: 1 %
KETONES, URINE: NEGATIVE
LEUKOCYTE ESTERASE, URINE: NEGATIVE
LYMPHOCYTES # BLD: 18 % (ref 24–43)
MCH RBC QN AUTO: 29 PG (ref 25.2–33.5)
MCHC RBC AUTO-ENTMCNC: 32.1 G/DL (ref 28.4–34.8)
MCV RBC AUTO: 90.5 FL (ref 82.6–102.9)
MDMA URINE: NORMAL
METHADONE SCREEN, URINE: NEGATIVE
METHAMPHETAMINE, URINE: NORMAL
MONOCYTES # BLD: 6 % (ref 3–12)
NITRITE, URINE: NEGATIVE
NRBC AUTOMATED: 0 PER 100 WBC
OPIATES, URINE: NEGATIVE
OXYCODONE SCREEN URINE: NEGATIVE
PDW BLD-RTO: 12.6 % (ref 11.8–14.4)
PH UA: 5.5 (ref 5–8)
PHENCYCLIDINE, URINE: NEGATIVE
PLATELET # BLD: 240 K/UL (ref 138–453)
PLATELET ESTIMATE: ABNORMAL
PMV BLD AUTO: 9.9 FL (ref 8.1–13.5)
PROPOXYPHENE, URINE: NORMAL
PROTEIN UA: NEGATIVE
RBC # BLD: 3.79 M/UL (ref 3.95–5.11)
RBC # BLD: ABNORMAL 10*6/UL
SARS-COV-2, RAPID: NOT DETECTED
SARS-COV-2: NORMAL
SARS-COV-2: NORMAL
SEG NEUTROPHILS: 75 % (ref 36–65)
SEGMENTED NEUTROPHILS ABSOLUTE COUNT: 7.62 K/UL (ref 1.5–8.1)
SOURCE: NORMAL
SPECIFIC GRAVITY UA: 1.02 (ref 1–1.03)
T. PALLIDUM, IGG: NONREACTIVE
TEST INFORMATION: NORMAL
TRICYCLIC ANTIDEPRESSANTS, UR: NORMAL
TURBIDITY: CLEAR
URINE HGB: NEGATIVE
UROBILINOGEN, URINE: NORMAL
WBC # BLD: 10.3 K/UL (ref 3.5–11.3)
WBC # BLD: ABNORMAL 10*3/UL

## 2020-12-01 PROCEDURE — 81003 URINALYSIS AUTO W/O SCOPE: CPT

## 2020-12-01 PROCEDURE — 76821 MIDDLE CEREBRAL ARTERY ECHO: CPT | Performed by: OBSTETRICS & GYNECOLOGY

## 2020-12-01 PROCEDURE — 6360000002 HC RX W HCPCS: Performed by: STUDENT IN AN ORGANIZED HEALTH CARE EDUCATION/TRAINING PROGRAM

## 2020-12-01 PROCEDURE — 76819 FETAL BIOPHYS PROFIL W/O NST: CPT | Performed by: OBSTETRICS & GYNECOLOGY

## 2020-12-01 PROCEDURE — 76815 OB US LIMITED FETUS(S): CPT | Performed by: OBSTETRICS & GYNECOLOGY

## 2020-12-01 PROCEDURE — 76817 TRANSVAGINAL US OBSTETRIC: CPT | Performed by: OBSTETRICS & GYNECOLOGY

## 2020-12-01 PROCEDURE — 87081 CULTURE SCREEN ONLY: CPT

## 2020-12-01 PROCEDURE — 86901 BLOOD TYPING SEROLOGIC RH(D): CPT

## 2020-12-01 PROCEDURE — 86870 RBC ANTIBODY IDENTIFICATION: CPT

## 2020-12-01 PROCEDURE — U0002 COVID-19 LAB TEST NON-CDC: HCPCS

## 2020-12-01 PROCEDURE — 80307 DRUG TEST PRSMV CHEM ANLYZR: CPT

## 2020-12-01 PROCEDURE — 86780 TREPONEMA PALLIDUM: CPT

## 2020-12-01 PROCEDURE — 86900 BLOOD TYPING SEROLOGIC ABO: CPT

## 2020-12-01 PROCEDURE — 99213 OFFICE O/P EST LOW 20 MIN: CPT

## 2020-12-01 PROCEDURE — 86850 RBC ANTIBODY SCREEN: CPT

## 2020-12-01 PROCEDURE — 2580000003 HC RX 258: Performed by: STUDENT IN AN ORGANIZED HEALTH CARE EDUCATION/TRAINING PROGRAM

## 2020-12-01 PROCEDURE — 96372 THER/PROPH/DIAG INJ SC/IM: CPT

## 2020-12-01 PROCEDURE — 85025 COMPLETE CBC W/AUTO DIFF WBC: CPT

## 2020-12-01 PROCEDURE — 1220000000 HC SEMI PRIVATE OB R&B

## 2020-12-01 PROCEDURE — 76820 UMBILICAL ARTERY ECHO: CPT | Performed by: OBSTETRICS & GYNECOLOGY

## 2020-12-01 RX ORDER — SODIUM CHLORIDE, SODIUM LACTATE, POTASSIUM CHLORIDE, CALCIUM CHLORIDE 600; 310; 30; 20 MG/100ML; MG/100ML; MG/100ML; MG/100ML
INJECTION, SOLUTION INTRAVENOUS CONTINUOUS
Status: DISCONTINUED | OUTPATIENT
Start: 2020-12-01 | End: 2020-12-01

## 2020-12-01 RX ORDER — ONDANSETRON 2 MG/ML
4 INJECTION INTRAMUSCULAR; INTRAVENOUS EVERY 6 HOURS PRN
Status: DISCONTINUED | OUTPATIENT
Start: 2020-12-01 | End: 2020-12-28 | Stop reason: HOSPADM

## 2020-12-01 RX ORDER — DIPHENHYDRAMINE HCL 25 MG
25 TABLET ORAL EVERY 4 HOURS PRN
Status: DISCONTINUED | OUTPATIENT
Start: 2020-12-01 | End: 2020-12-28 | Stop reason: HOSPADM

## 2020-12-01 RX ORDER — BETAMETHASONE SODIUM PHOSPHATE AND BETAMETHASONE ACETATE 3; 3 MG/ML; MG/ML
12 INJECTION, SUSPENSION INTRA-ARTICULAR; INTRALESIONAL; INTRAMUSCULAR; SOFT TISSUE DAILY
Status: COMPLETED | OUTPATIENT
Start: 2020-12-01 | End: 2020-12-02

## 2020-12-01 RX ORDER — ACETAMINOPHEN 500 MG
1000 TABLET ORAL EVERY 6 HOURS PRN
Status: DISCONTINUED | OUTPATIENT
Start: 2020-12-01 | End: 2020-12-28 | Stop reason: HOSPADM

## 2020-12-01 RX ORDER — SODIUM CHLORIDE 0.9 % (FLUSH) 0.9 %
10 SYRINGE (ML) INJECTION 2 TIMES DAILY
Status: DISCONTINUED | OUTPATIENT
Start: 2020-12-01 | End: 2020-12-25

## 2020-12-01 RX ADMIN — BETAMETHASONE SODIUM PHOSPHATE AND BETAMETHASONE ACETATE 12 MG: 3; 3 INJECTION, SUSPENSION INTRA-ARTICULAR; INTRALESIONAL; INTRAMUSCULAR; SOFT TISSUE at 10:57

## 2020-12-01 RX ADMIN — SODIUM CHLORIDE, PRESERVATIVE FREE 10 ML: 5 INJECTION INTRAVENOUS at 21:20

## 2020-12-01 ASSESSMENT — PAIN SCALES - GENERAL: PAINLEVEL_OUTOF10: 0

## 2020-12-01 NOTE — H&P
OBSTETRICAL HISTORY Aldair Guzman    Date: 2020       Time: 9:24 AM   Patient Name: Maury Arredondo     Patient : 1995  Room/Bed: 7151/9333-47    Admission Date/Time: 2020  8:51 AM      CC: Inpatient management of known Vasa Previa     HPI: Shaylee Maharaj is a 30 y.o.  at 31w2d who presents for inpatient management of known Vasa Previa. Patient has had known Vasa Previa throughout the pregnancy with original plan for admission at 32 weeks if cervical length remained reassuring, but today patient reports increasing anxiety about the condition and would like to start her inpatient admission early. She is doing well without complaints. The patient reports fetal movement is present, denies contractions, denies loss of fluid, denies vaginal bleeding. Pregnancy is complicated by Vasa Previa, Hx Pulmonary Stenosis s/p Valvuloplasty ([place] echo wnl), Marginal cord insertion, Anterior accessory lobe, Lagging AC <3rd percentile, Elevated BP x1 (20), BMI 44.80    DATING:  LMP: Patient's last menstrual period was 2020 (exact date).   Estimated Date of Delivery: 21   Based on: LMP, cw us at 8 0/7 weeks GA    PREGNANCY RISK FACTORS:  Patient Active Problem List   Diagnosis    Rh neg/RNI/GBS unk    Hx Pulmonary Stenosis s/p Valvuloplasty     Vasa previa    Marginal cord insertion    Lagging AC     BMI 44.80    Elevated x1 (20)    31 weeks gestation of pregnancy        Steroids Given In This Pregnancy:  no     REVIEW OF SYSTEMS:   Constitutional: negative fever, negative chills, negative weight changes   HEENT: negative visual disturbances, negative headaches, negative dizziness, negative hearing loss  Breast: Negative breast abnormalities, negative breast lumps, negative nipple discharge  Respiratory: negative dyspnea, negative cough, negative SOB  Cardiovascular: negative chest pain,  negative palpitations, negative arrhythmia, negative syncope   Gastrointestinal: negative abdominal pain, negative RUQ pain, negative N/V, negative diarrhea, negative constipation, negative bowel changes, negative heartburn   Genitourinary: negative dysuria, negative hematuria, negative urinary incontinence, negative vaginal discharge, negative vaginal bleeding or spotting  Dermatological: negative rash, negative pruritis, negative mole or other skin changes  Hematologic: negative bruising  Immunologic/Lymphatic: negative recent illness, negative recent sick contact  Musculoskeletal: negative back pain, negative myalgias, negative arthralgias  Neurological:  negative dizziness, negative migraines, negative seizures, negative weakness  Behavior/Psych: negative depression, negative anxiety, negative SI, negative HI    OBSTETRICAL HISTORY:   OB History    Para Term  AB Living   1 0 0 0 0 0   SAB TAB Ectopic Molar Multiple Live Births   0 0 0 0 0 0      # Outcome Date GA Lbr Agustín/2nd Weight Sex Delivery Anes PTL Lv   1 Current                PAST MEDICAL HISTORY:   has a past medical history of Pulmonary valve stenosis. PAST SURGICAL HISTORY:   has a past surgical history that includes Cardiac valuve replacement; Cardiac catheterization; and Vulva surgery. ALLERGIES:  has No Known Allergies. MEDICATIONS:  Prior to Admission medications    Medication Sig Start Date End Date Taking? Authorizing Provider   aspirin 81 MG EC tablet Take 81 mg by mouth daily 9/15/20  Yes Historical Provider, MD   Misc. Devices (BREAST PUMP[place] [facility] [place] electric breast pump 20  Yes JP Garg - CNP   Prenatal Vit-Fe Fumarate-FA (PRENATAL PO) Take by mouth   Yes Historical Provider, MD       FAMILY HISTORY:  family history includes Other in her mother. SOCIAL HISTORY:   reports that she has never smoked. She has never used smokeless tobacco. She reports previous alcohol use. She reports that she does not use drugs.     VITALS:  Vitals: 12/01/20 0945   BP: 130/68   Pulse: 105   Resp: 16   Temp: 98.2 °F (36.8 °C)   TempSrc: Oral         PHYSICAL EXAM:  Fetal Heart Monitor:  Baseline Heart Rate 140, moderate variability, present accelerations, absent decelerations  Mulvane: contractions, none    General appearance:  no apparent distress, alert and cooperative  HEENT: head atraumatic, normocephalic, moist mucous membranes, trachea midline  Neurologic:  alert, oriented, normal speech, no focal findings or movement disorder noted  Lungs:  No increased work of breathing, good air exchange, clear to auscultation bilaterally, no crackles or wheezing  Heart:  regular rate and rhythm and no murmur, rubs, gallops  Abdomen:  soft, gravid, non-tender, no rebound, guarding, or rigidity, no RUQ or epigastric tenderness, no signs or symptoms of abruption, no signs or symptoms of chorioamnionitis, and obese  Extremities:  no calf tenderness, non edematous, no varicosities, full range of motion in all four extremities  Musculoskeletal: Gross strength equal and intact throughout, no gross abnormalities, range of motion normal in hips, knees, shoulders and spine  Psychiatric: Mood appropriate, normal affect   Rectal Exam: not indicated      PRENATAL LAB RESULTS:   Blood Type/Rh: A neg  Antibody Screen: negative  Hemoglobin, Hematocrit, Platelets: 46.9 / 77.0 / 289  Rubella: non-immune  T.  Pallidum, IgG: non-reactive   Hepatitis B Surface Antigen: negative   HIV: negative   Sickle Cell Screen: not available  Gonorrhea: negative  Chlamydia: negative  UA: negative, date: 11/9/20    1 hour Glucose Tolerance Test: 105      Group B Strep: collected today, negative on 6/25/20  Cystic Fibrosis Screen: negative  First Trimester Screen: not available  MSAFP/Multiple Markers: not available  Non-Invasive Prenatal Testing: not available  Anatomy US: posterior placenta w/ anterior accessory lobe, 3vc w/ marginal insertion, normal female anatomy, Vasa previa  Fetal Echo: 07/14/2020     Dx at 2 and underwent a balloon valvuloplasty at age 1 years, performed by Jennifer Desir at [facility]  In [address] [place] [address]. Most recent visit to peds cardiology scanned into [address] discussed with Dr. Jane Aguilar, who is agreeable. Steroids given this admission: Yes    Risks, benefits, alternatives and possible complications have been discussed in detail with the patient. Admission, and post admission procedures and expectations were discussed in detail. All questions were answered.     Attending's Name: Dr. Liyah Ramsay DO  Ob/Gyn Resident  12/1/2020, 9:24 AM

## 2020-12-01 NOTE — PROGRESS NOTES
OB Resident Interim Note     C/S consent obtained. All questions addressed. R/B/A discussed.         Hima Aguirre  OB/GYN Resident, PGY4  Pager: [phone] [facility]  12/01/20  10:58 AM

## 2020-12-01 NOTE — FLOWSHEET NOTE
Pt presents to L and D, via ambulatory. Pt here for monitoring until delivery d/t vasaprevia. Pt denies any LOF, vaginal bleeding or ctx. +FM. Pt gowned and in bed, oriented to room and call light. EFM explained and applied. Dr. Rodolfo Jin and Dr. Patrick Thornton notified of admission.

## 2020-12-01 NOTE — DISCHARGE SUMMARY
Obstetric Discharge Summary  [facility]    Patient Name: Jayden Arredondo  Patient : 1995  Primary Care Physician: PHYSICIAN, NON-STAFF  Admit Date: 2020    Principal Diagnosis: IUP at 31w2d, admitted for inpatient management of Vasa Previa      Her pregnancy has been complicated by:   Patient Active Problem List   Diagnosis    Rh neg/RNI/GBS unk    Hx Pulmonary Stenosis s/p Valvuloplasty     Vasa previa    Marginal cord insertion    Lagging AC     BMI 44.80    Celestone 20,     Gestational hypertension (G1)    Placenta marginalis in third trimester    Placenta succenturiata in third trimester    Poor fetal growth complicating pregnancy, antepartum     screening for fetal growth retardation using ultrasonics    PLTCS 20 F Apg  Wt 4#5       Infection Present?: No  Hospital Acquired: No    Surgical Operations & Procedures:  [] Pitocin Induction of Labor  [] Pitocin Augmentation of Labor  [] Prostaglandin Induction of Labor  [] Mechanical Induction of Labor  [] Artificial Rupture of Membranes  [] Intrauterine Pressure Catheter  [] Fetal Scalp Electrode  [] Amnioinfusion  Analgesia: spinal  Delivery Type:  Delivery: See Labor and Delivery Summary   Laceration(s): Absent    Consultations: MFM, NICU and Anesthesia    Pertinent Findings & Procedures:   Jayden Arredondo is a 30 y.o. female  at 32w1d admitted for inpatient management of Vasa Previa. 20: COVID19 negative. Received Celestone. 20: Received second dose of Celestone. 12/3/20: NICU consult completed      20: Peripheral IV infiltrated. PICC team was consulted and Midline was placed. Patient met criteria for gHTN, PreE labs wnl x1, P/C 0.1 ()     20: Lovenox 40 mg QD was started for DVT prophylaxis.      20: Patient switched to heparin    [facility] US 20: MFM  Succenturiate lobe, marginal cord and vasa previa all visualized again today, KELECHI 13.51 cm. MFM scan : Succenturiate lobe, marginal cord and vasa previa all visualized again today. Lagging AC. KELECHI 11.67 cm, EFW 3#10    : Received rescue dose of Celestone. : Received second rescue dose of Celestone    : MFM scan : Succenturiate lobe, marginal cord and vasa previa all visualized again today. BPP 8/ KELECHI 12.11 cm. Anesthesia consulted and rec delivery in the main OR.     : CBC wnl and L peripheral IV was placed for additional access. : Patient received Ancef/bicitra pre-operatively. She delivered by primary low transverse  a Live Born infant on 2020. Information for the patient's :  Minor, Baby Girl Ventura Abdi   female   Birth Weight: 4 lb 5.5 oz (1.97 kg)       Apgars: 9 at 1 minute and 9 at 5 minutes. Postpartum course:     POD#1: Hgb stable at 10.1. Course of patient: uncomplicated    Discharge to: Home    Readmission planned: no     Recommendations on Discharge:     Medications:      Medication List      START taking these medications    ibuprofen 600 MG tablet  Commonly known as: ADVIL;MOTRIN  Take 1 tablet by mouth every 6 hours as needed for Pain     oxyCODONE-acetaminophen 5-325 MG per tablet  Commonly known as: Percocet  Take 1 tablet by mouth every 6 hours as needed for Pain for up to 7 days. Intended supply: 7 days.  Take lowest dose possible to manage pain     senna 8.6 MG tablet  Commonly known as: Senokot  Take 1 tablet by mouth 2 times daily        CONTINUE taking these medications    Breast Pump Misc  Double electric breast pump     PRENATAL PO        STOP taking these medications    aspirin 81 MG EC tablet           Where to Get Your Medications      You can get these medications from any pharmacy    Bring a paper prescription for each of these medications  · ibuprofen 600 MG tablet  · oxyCODONE-acetaminophen 5-325 MG per tablet  · senna 8.6 MG tablet         Activity: pelvic rest x 6 weeks, no driving on narcotics, no lifting greater than 15 lbs  Diet: regular diet  Follow up: 1 weeks for BP check and Silver dressing removal     Condition on discharge: stable    Discharge date: 12/30/2020    Phuong Ford DO  Ob/Gyn Resident    Comments:  Home care and follow-up care were reviewed. Pelvic rest, and birth control were reviewed. Signs and symptoms of mastitis and post partum depression were reviewed. The patient is to notify her physician if any of these occur. The patient was counseled on secondary smoke risks and the increased risk of sudden infant death syndrome and respiratory problems to her baby with exposure. She was counseled on various alternate recommendations to decrease the exposure to secondary smoke to her children.

## 2020-12-02 PROCEDURE — 1220000000 HC SEMI PRIVATE OB R&B

## 2020-12-02 PROCEDURE — 6360000002 HC RX W HCPCS: Performed by: STUDENT IN AN ORGANIZED HEALTH CARE EDUCATION/TRAINING PROGRAM

## 2020-12-02 PROCEDURE — 2580000003 HC RX 258: Performed by: STUDENT IN AN ORGANIZED HEALTH CARE EDUCATION/TRAINING PROGRAM

## 2020-12-02 PROCEDURE — 96372 THER/PROPH/DIAG INJ SC/IM: CPT

## 2020-12-02 RX ADMIN — SODIUM CHLORIDE, PRESERVATIVE FREE 10 ML: 5 INJECTION INTRAVENOUS at 21:51

## 2020-12-02 RX ADMIN — BETAMETHASONE SODIUM PHOSPHATE AND BETAMETHASONE ACETATE 12 MG: 3; 3 INJECTION, SUSPENSION INTRA-ARTICULAR; INTRALESIONAL; INTRAMUSCULAR; SOFT TISSUE at 11:38

## 2020-12-02 RX ADMIN — SODIUM CHLORIDE, PRESERVATIVE FREE 10 ML: 5 INJECTION INTRAVENOUS at 08:59

## 2020-12-02 NOTE — PROGRESS NOTES
OB/GYN PROGRESS NOTE    Larry Arredondo is a 30 y.o. female  at [address] [facility] Day: 2    Subjective:   Patient has been seen and examined. Patient is resting comfortably in bed, complaining of nothing this morning. Patient denies any vaginal discharge and any urinary complaints. The patient reports fetal movement is present, denies contractions, denies loss of fluid, denies vaginal bleeding. Patient denies headache, vision changes, nausea, vomiting, fever, chills, shortness of breath, chest pain, RUQ pain, abdominal pain, diarrhea, change in color/amount/odor of vaginal discharge, dysuria or, hematuria.      Objective:   Vitals:  Vitals:    20 0945 20   BP: 130/68 123/67   Pulse: 105 98   Resp: 16 16   Temp: 98.2 °F (36.8 °C) 97.9 °F (36.6 °C)   TempSrc: Oral Oral   Weight: 262 lb (118.8 kg)    Height: 5' 4\" (1.626 m)        FHT: 135, moderate variability, accelerations present, decelerations absent  Contractions: none    Physical Exam:  General appearance:  no apparent distress, alert and cooperative  HEENT: head atraumatic, normocephalic, moist mucous membranes, trachea midline  Neurologic:  alert, oriented, normal speech, no focal findings or movement disorder noted  Lungs:  No increased work of breathing, good air exchange, clear to auscultation bilaterally, no crackles or wheezing  Heart:  regular rate and rhythm and no murmur    Abdomen:  soft, gravid, non-tender, no rebound, guarding, or rigidity, no RUQ or epigastric tenderness, no signs or symptoms of abruption and no signs or symptoms of chorioamnionitis  Extremities:  no calf tenderness, non-edematous  Musculoskeletal: Gross strength equal and intact throughout, no gross abnormalities, range of motion normal in hips, knees, shoulders and spine, CVA tenderness: none  Psychiatric: Mood appropriate, normal affect   Rectal Exam: not indicated  Pelvic Exam: not indicated     DATA:  Labs:   Recent Results (from the past 24 hour(s))   DRUG SCREEN MULTI URINE    Collection Time: 12/01/20  9:50 AM   Result Value Ref Range    Amphetamine Screen, Ur NEGATIVE NEGATIVE    Barbiturate Screen, Ur NEGATIVE NEGATIVE    Benzodiazepine Screen, Urine NEGATIVE NEGATIVE    Cocaine Metabolite, Urine NEGATIVE NEGATIVE    Methadone Screen, Urine NEGATIVE NEGATIVE    Opiates, Urine NEGATIVE NEGATIVE    Phencyclidine, Urine NEGATIVE NEGATIVE    Propoxyphene, Urine NOT REPORTED NEGATIVE    Cannabinoid Scrn, Ur NEGATIVE NEGATIVE    Oxycodone Screen, Ur NEGATIVE NEGATIVE    Methamphetamine, Urine NOT REPORTED NEGATIVE    Tricyclic Antidepressants, Urine NOT REPORTED NEGATIVE    MDMA, Urine NOT REPORTED NEGATIVE    Buprenorphine Urine NOT REPORTED NEGATIVE    Test Information       Assay provides medical screening only. The absence of expected drug(s) and/or metabolite(s) may indicate diluted or adulterated urine, limitations of testing or timing of collection. Urinalysis Reflex to Culture    Collection Time: 12/01/20  9:50 AM   Result Value Ref Range    Color, UA YELLOW YELLOW    Turbidity UA CLEAR CLEAR    Glucose, Ur NEGATIVE NEGATIVE    Bilirubin Urine NEGATIVE NEGATIVE    Ketones, Urine NEGATIVE NEGATIVE    Specific Gravity, UA 1.023 1.005 - 1.030    Urine Hgb NEGATIVE NEGATIVE    pH, UA 5.5 5.0 - 8.0    Protein, UA NEGATIVE NEGATIVE    Urobilinogen, Urine Normal Normal    Nitrite, Urine NEGATIVE NEGATIVE    Leukocyte Esterase, Urine NEGATIVE NEGATIVE    Urinalysis Comments       Microscopic exam not performed based on chemical results unless requested in original order.    CBC auto differential    Collection Time: 12/01/20 10:41 AM   Result Value Ref Range    WBC 10.3 3.5 - 11.3 k/uL    RBC 3.79 (L) 3.95 - 5.11 m/uL    Hemoglobin 11.0 (L) 11.9 - 15.1 g/dL    Hematocrit 34.3 (L) 36.3 - 47.1 %    MCV 90.5 82.6 - 102.9 fL    MCH 29.0 25.2 - 33.5 pg    MCHC 32.1 28.4 - 34.8 g/dL    RDW 12.6 11.8 - 14.4 %    Platelets 323 012 - 197 k/uL    MPV 9.9 8.1 - 13.5 fL    NRBC Automated 0.0 0.0 per 100 WBC    Differential Type NOT REPORTED     Seg Neutrophils 75 (H) 36 - 65 %    Lymphocytes 18 (L) 24 - 43 %    Monocytes 6 3 - 12 %    Eosinophils % 0 (L) 1 - 4 %    Basophils 0 0 - 2 %    Immature Granulocytes 1 (H) 0 %    Segs Absolute 7.62 1.50 - 8.10 k/uL    Absolute Lymph # 1.85 1.10 - 3.70 k/uL    Absolute Mono # 0.58 0.10 - 1.20 k/uL    Absolute Eos # 0.04 0.00 - 0.44 k/uL    Basophils Absolute 0.04 0.00 - 0.20 k/uL    Absolute Immature Granulocyte 0.12 0.00 - 0.30 k/uL    WBC Morphology NOT REPORTED     RBC Morphology NOT REPORTED     Platelet Estimate NOT REPORTED    TYPE AND SCREEN    Collection Time: 20 10:41 AM   Result Value Ref Range    Expiration Date 2020,2359     Arm Band Number [id number]     ABO/Rh A NEGATIVE     Antibody Screen POSITIVE     Antibody ID Anti-D, Passive Due To RhIG    T. pallidum Ab    Collection Time: 20 10:41 AM   Result Value Ref Range    T. pallidum, IgG NONREACTIVE NONREACTIVE   COVID-19    Collection Time: 20  2:14 PM    Specimen: Other   Result Value Ref Range    SARS-CoV-2          SARS-CoV-2, Rapid Not Detected Not Detected    Source . NASOPHARYNGEAL SWAB     SARS-CoV-2             Diagnostics:   [facility] 20: Posterior placenta w/ anterior acessory lobe, Vasa previa, Marginal cord, BPP 8/8, KELECHI 13.59, EFW 2#10 ()     Assessment/Plan:  Odella Kayser Minor is a 30 y.o. female  at [address] [facility]   - Rh negative / Rubella non-immune / GBS pending   - No indication for GBS prophylaxis    - Will need MMR postpartum    - Rhogam: 20; will need Rhogam w/u postpartum   - Influenza vaccination: 20   - Tdap vaccination: 20   - Continue PNV daily, SCDs    - COVID-19 negative on 20     Inpatient management of Vasa Previa   - Afebrile, VSS   - CEFM/TOCO   - Cat I FHT, TOCO no contractions   - CBC and T&S q 3 days, next on 20   - S/P Celestone x1, next dose at 1057 on 20   - Plan for rescue course of Celestone one week prior to planned delivery   - Patient will need Magnesium sulfate for neuroprotection if delivery is imminent    - C/S consent in chart on 12/1/20    - NICU consulted, appreciate recommendations   - MFM US 12/1/20: Posterior placenta w/ anterior acessory lobe, Vasa previa, Marginal cord, BPP 8/8, KELECHI 13.59, EFW 2#10 (11/23)    - Plan for repeat MFM US weekly on Mondays    Hx Pulmonary Stenosis S/P Balloon Valvuloplasty   - Patient with a history of congenital pulmonary stenosis s/p balloon valvuloplasty at 1years old   - ECHO 6/23/20: mild to moderate pulmonary insufficiency, stable from 05/2016   - Patient evaluated by Cardiology and recommended follow up in 3 years   - Fetal ECHO 10/26/20 wnl    Marginal Cord Insertion   - Following with MFM    Lagging AC, BPD and HC   - MFM US 11/23/20: BPD, HC and AC < 3rd%ile with overall EFW 27%ile   - NIPT pending     Elevated BP x1 (11/29/20)   - Blood pressures normotensive overnight   - Denies s/s preE     BMI 44.97    Patient Active Problem List    Diagnosis Date Noted    Celestone 12/1/20, ** 12/01/2020     Priority: High    31 weeks gestation of pregnancy 12/01/2020    Vasa previa 11/29/2020     Overview Note:     Plan for inpatient management at 32 weeks.  Marginal cord insertion 11/29/2020    Lagging AC  11/29/2020    BMI 44.80 11/29/2020    Elevated x1 (11/29/20) 11/29/2020     Overview Note:     11/29/20: Patient with elevated /75. If patient has another elevated BP > 140/90, she will meet criteria for gHTN.  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 Pt desires care with different 2018 [facility] records/ultrasound/labs  from previous provider from [address] [place] [address].  scanned into media. Pt moved from       Hx Pulmonary Stenosis s/p Valvuloplasty  07/14/2020     Overview Note:     Dx at 2 and underwent a balloon valvuloplasty at age 1 years, performed by Aureliano Valladares at [facility]  In [address] [place] [address].    Most recent visit to peds cardiology scanned into media-SK         Will update Dr. Devaughn Conti.      Keyla Jerez DO  Ob/Gyn Resident  12/2/2020, 12:00 AM

## 2020-12-03 PROCEDURE — 1220000000 HC SEMI PRIVATE OB R&B

## 2020-12-03 PROCEDURE — 2580000003 HC RX 258: Performed by: STUDENT IN AN ORGANIZED HEALTH CARE EDUCATION/TRAINING PROGRAM

## 2020-12-03 PROCEDURE — 99252 IP/OBS CONSLTJ NEW/EST SF 35: CPT | Performed by: PEDIATRICS

## 2020-12-03 RX ADMIN — SODIUM CHLORIDE, PRESERVATIVE FREE 10 ML: 5 INJECTION INTRAVENOUS at 19:48

## 2020-12-03 RX ADMIN — SODIUM CHLORIDE, PRESERVATIVE FREE 10 ML: 5 INJECTION INTRAVENOUS at 09:53

## 2020-12-03 ASSESSMENT — PAIN SCALES - GENERAL
PAINLEVEL_OUTOF10: 0
PAINLEVEL_OUTOF10: 0

## 2020-12-03 NOTE — FLOWSHEET NOTE
Pt sitting up eating dinner, will adjust when she is finished. +FM at this time, pt offers no complaints.

## 2020-12-03 NOTE — PROGRESS NOTES
In room to discuss with patient current management, she is very well educated on her condition per [facility] close comanagement and consults with Dr. Chad Esposito. Plan is for PLTCS on 2020 at 35 weeks so long as  testing remains stable     Patient received steroids on admission yesterday and today     No questions or concerns today, no bleeding, no cramps or contractions, feeling appropriate fetal movement.

## 2020-12-03 NOTE — FLOWSHEET NOTE
Pt sitting upright in bed, working on laptop. She offers no complaints at this time. RN discusses potentially getting another IV access that could last longer. Also discussed with Dr. Phuong Treviño, who is agreeable. Will coordinate placement with next lab draw.

## 2020-12-03 NOTE — CARE COORDINATION
Reason for Admission: 31 weeks gestation of pregnancy [Z3A.31]    HPI: 30 y.o. female  at 27w4d presents for inpatient management of known Vasa Previa.   Sherry Richards has everything set up for remote working from the hospital.  Has her next MFM scan on Monday    MFM 20: Posterior placenta w/ anterior acessory lobe, Vasa previa, Marginal cord, BPP 8/8, KELECHI 13.59, EFW 2#10 ()     Treatment Plan of Care:        - Rh negative / Rubella non-immune / GBS pending              - No indication for GBS prophylaxis               - Will need MMR postpartum               - Rhogam: 20; will need Rhogam w/u postpartum              - Influenza vaccination: 20              - Tdap vaccination: 20              - Continue PNV daily, SCDs               - COVID-19 negative on 20      Inpatient management of Vasa Previa              - Afebrile, VSS              - CEFM/TOCO              - Cat I FHT, TOCO no contractions              - CBC and T&S q 3 days, next on 20              - Plan for rescue course of Celestone one week prior to planned delivery              - Patient will need Magnesium sulfate for neuroprotection if delivery is imminent               - C/S consent in chart on 20               - NICU consulted, appreciate recommendations              - MFM US 20: Posterior placenta w/ anterior acessory lobe, Vasa previa, Marginal cord, BPP 8/8, KELECHI 13.59, EFW 2#10 ()               - Plan for repeat MFM US weekly on      S/P Celestone  and 20      Hx Pulmonary Stenosis S/P Balloon Valvuloplasty              - Patient with a history of congenital pulmonary stenosis s/p balloon valvuloplasty at 1years old              - ECHO 20: mild to moderate pulmonary insufficiency, stable from 2016              - Patient evaluated by Cardiology and recommended follow up in 3 years              - Fetal ECHO 10/26/20 wnl     Marginal Cord Insertion              - Following with MFM     Lagging AC, BPD and HC              - MFM US 11/23/20: BPD, HC and AC < 3rd%ile with overall EFW 27%ile              - NIPT pending      Elevated BP x1 (11/29/20)              - Blood pressures normotensive overnight              - Denies s/s preE      BMI 44.97    Continued inpatient management until PLTCS at 35 weeks on 12/28/2020 with Dr. Clinton Beasley    CM to continue to follow for DC needs.

## 2020-12-03 NOTE — PROGRESS NOTES
OB/GYN PROGRESS NOTE    Maury Arredondo is a 30 y.o. female  at [place] [address] Day: 3    Subjective:   Patient has been seen and examined. Patient is sleeping comfortably in bed, complaining of nothing this morning. Patient denies any vaginal discharge and any urinary complaints. The patient reports fetal movement is present, denies contractions, denies loss of fluid, denies vaginal bleeding. Patient denies headache, vision changes, nausea, vomiting, fever, chills, shortness of breath, chest pain, RUQ pain, abdominal pain, diarrhea, change in color/amount/odor of vaginal discharge, dysuria or, hematuria.      Objective:   Vitals:  Vitals:    20 0503 20 0802 20   BP: 123/67 (!) 121/48 127/71 134/65   Pulse: 98 90 100 92   Resp: 16 16 16 18   Temp: 97.9 °F (36.6 °C)  98.8 °F (37.1 °C) 98.2 °F (36.8 °C)   TempSrc: Oral   Oral   Weight:       Height:           FHT: 125, moderate variability, accelerations present, decelerations absent  Contractions: none    Physical Exam:  General appearance:  no apparent distress, alert and cooperative  HEENT: head atraumatic, normocephalic, moist mucous membranes, trachea midline  Neurologic:  alert, oriented, normal speech, no focal findings or movement disorder noted  Lungs:  No increased work of breathing, good air exchange, clear to auscultation bilaterally, no crackles or wheezing  Heart:  regular rate and rhythm and no murmur    Abdomen:  soft, gravid, non-tender, no rebound, guarding, or rigidity, no RUQ or epigastric tenderness, no signs or symptoms of abruption and no signs or symptoms of chorioamnionitis  Extremities:  no calf tenderness, non-edematous  Musculoskeletal: Gross strength equal and intact throughout, no gross abnormalities, range of motion normal in hips, knees, shoulders and spine, CVA tenderness: none  Psychiatric: Mood appropriate, normal affect   Rectal Exam: not indicated  Pelvic Exam: not indicated     DATA:  Labs: No results found for this or any previous visit (from the past 24 hour(s)).     Diagnostics:   MFM 20: Posterior placenta w/ anterior acessory lobe, Vasa previa, Marginal cord, BPP 8/8, KELECHI 13.59, EFW 2#10 ()     Assessment/Plan:  Francois Arredondo is a 30 y.o. female  at [address]          - Rh negative / Rubella non-immune / GBS pending              - No indication for GBS prophylaxis               - Will need MMR postpartum               - Rhogam: 20; will need Rhogam w/u postpartum              - Influenza vaccination: 20              - Tdap vaccination: 20              - Continue PNV daily, SCDs               - COVID-19 negative on 20      Inpatient management of Vasa Previa              - Afebrile, VSS              - CEFM/TOCO              - Cat I FHT, TOCO no contractions              - CBC and T&S q 3 days, next on 20              - Plan for rescue course of Celestone one week prior to planned delivery              - Patient will need Magnesium sulfate for neuroprotection if delivery is imminent               - C/S consent in chart on 20               - NICU consulted, appreciate recommendations              - MFM US 20: Posterior placenta w/ anterior acessory lobe, Vasa previa, Marginal cord, BPP 8/8, KELECHI 13.59, EFW 2#10 ()               - Plan for repeat MFM US weekly on     S/P Celestone  and 20      Hx Pulmonary Stenosis S/P Balloon Valvuloplasty              - Patient with a history of congenital pulmonary stenosis s/p balloon valvuloplasty at 1years old              - ECHO 20: mild to moderate pulmonary insufficiency, stable from 2016              - Patient evaluated by Cardiology and recommended follow up in 3 years              - Fetal ECHO 10/26/20 wnl     Marginal Cord Insertion              - Following with MFM     Lagging AC, BPD and HC              - MFM US 20: BPD, HC and AC < 3rd%ile with overall EFW 27%ile

## 2020-12-03 NOTE — PROGRESS NOTES
OB/GYN PROGRESS NOTE     Odella Kayser Minor is a 30 y.o. female  at [facility] Day: 3     Patient is doing very well. Has everything set up for remote working from the hospital.  Has her next MFM scan on Monday. Patient with no new developments or concerns since yesterday    VSS  NST reactive with FHT's 125 and NO contractions.      US:   MFM 20: Posterior placenta w/ anterior acessory lobe, Vasa previa, Marginal cord, BPP , KELECHI 13.59, EFW 2#10 ()      Assessment/Plan:  Odella Kayser Minor is a 30 y.o. female  at [address] [facility]                     - Rh negative    - inpatient for vasaprevia   - planned PLTCS at 35 weeks on 2020 with Dr. Lara Cleaves

## 2020-12-04 ENCOUNTER — APPOINTMENT (OUTPATIENT)
Dept: INTERVENTIONAL RADIOLOGY/VASCULAR | Age: 25
End: 2020-12-04
Payer: COMMERCIAL

## 2020-12-04 LAB
ABSOLUTE EOS #: 0.04 K/UL (ref 0–0.44)
ABSOLUTE IMMATURE GRANULOCYTE: 0.39 K/UL (ref 0–0.3)
ABSOLUTE LYMPH #: 2.66 K/UL (ref 1.1–3.7)
ABSOLUTE MONO #: 0.95 K/UL (ref 0.1–1.2)
ALBUMIN SERPL-MCNC: 3.2 G/DL (ref 3.5–5.2)
ALBUMIN/GLOBULIN RATIO: 1.1 (ref 1–2.5)
ALP BLD-CCNC: 77 U/L (ref 35–104)
ALT SERPL-CCNC: 26 U/L (ref 5–33)
ANION GAP SERPL CALCULATED.3IONS-SCNC: 11 MMOL/L (ref 9–17)
AST SERPL-CCNC: 25 U/L
BASOPHILS # BLD: 0 % (ref 0–2)
BASOPHILS ABSOLUTE: 0.04 K/UL (ref 0–0.2)
BILIRUB SERPL-MCNC: 0.36 MG/DL (ref 0.3–1.2)
BUN BLDV-MCNC: 10 MG/DL (ref 6–20)
BUN/CREAT BLD: ABNORMAL (ref 9–20)
CALCIUM SERPL-MCNC: 8.6 MG/DL (ref 8.6–10.4)
CHLORIDE BLD-SCNC: 107 MMOL/L (ref 98–107)
CO2: 20 MMOL/L (ref 20–31)
CREAT SERPL-MCNC: 0.4 MG/DL (ref 0.5–0.9)
CULTURE: NORMAL
DIFFERENTIAL TYPE: ABNORMAL
EOSINOPHILS RELATIVE PERCENT: 0 % (ref 1–4)
GFR AFRICAN AMERICAN: >60 ML/MIN
GFR NON-AFRICAN AMERICAN: >60 ML/MIN
GFR SERPL CREATININE-BSD FRML MDRD: ABNORMAL ML/MIN/{1.73_M2}
GFR SERPL CREATININE-BSD FRML MDRD: ABNORMAL ML/MIN/{1.73_M2}
GLUCOSE BLD-MCNC: 94 MG/DL (ref 70–99)
HCT VFR BLD CALC: 33.2 % (ref 36.3–47.1)
HEMOGLOBIN: 10.4 G/DL (ref 11.9–15.1)
IMMATURE GRANULOCYTES: 4 %
LYMPHOCYTES # BLD: 25 % (ref 24–43)
Lab: NORMAL
MCH RBC QN AUTO: 29.1 PG (ref 25.2–33.5)
MCHC RBC AUTO-ENTMCNC: 31.3 G/DL (ref 28.4–34.8)
MCV RBC AUTO: 92.7 FL (ref 82.6–102.9)
MONOCYTES # BLD: 9 % (ref 3–12)
NRBC AUTOMATED: 0 PER 100 WBC
PDW BLD-RTO: 12.6 % (ref 11.8–14.4)
PLATELET # BLD: 226 K/UL (ref 138–453)
PLATELET ESTIMATE: ABNORMAL
PMV BLD AUTO: 10 FL (ref 8.1–13.5)
POTASSIUM SERPL-SCNC: 4.2 MMOL/L (ref 3.7–5.3)
RBC # BLD: 3.58 M/UL (ref 3.95–5.11)
RBC # BLD: ABNORMAL 10*6/UL
SEG NEUTROPHILS: 62 % (ref 36–65)
SEGMENTED NEUTROPHILS ABSOLUTE COUNT: 6.7 K/UL (ref 1.5–8.1)
SODIUM BLD-SCNC: 138 MMOL/L (ref 135–144)
SPECIMEN DESCRIPTION: NORMAL
TOTAL PROTEIN: 6 G/DL (ref 6.4–8.3)
WBC # BLD: 10.8 K/UL (ref 3.5–11.3)
WBC # BLD: ABNORMAL 10*3/UL

## 2020-12-04 PROCEDURE — 82570 ASSAY OF URINE CREATININE: CPT

## 2020-12-04 PROCEDURE — 86900 BLOOD TYPING SEROLOGIC ABO: CPT

## 2020-12-04 PROCEDURE — C1751 CATH, INF, PER/CENT/MIDLINE: HCPCS

## 2020-12-04 PROCEDURE — 86870 RBC ANTIBODY IDENTIFICATION: CPT

## 2020-12-04 PROCEDURE — 2580000003 HC RX 258: Performed by: STUDENT IN AN ORGANIZED HEALTH CARE EDUCATION/TRAINING PROGRAM

## 2020-12-04 PROCEDURE — 86850 RBC ANTIBODY SCREEN: CPT

## 2020-12-04 PROCEDURE — 36410 VNPNXR 3YR/> PHY/QHP DX/THER: CPT

## 2020-12-04 PROCEDURE — 2709999900 HC NON-CHARGEABLE SUPPLY

## 2020-12-04 PROCEDURE — 84156 ASSAY OF PROTEIN URINE: CPT

## 2020-12-04 PROCEDURE — 1220000000 HC SEMI PRIVATE OB R&B

## 2020-12-04 PROCEDURE — 80053 COMPREHEN METABOLIC PANEL: CPT

## 2020-12-04 PROCEDURE — 36415 COLL VENOUS BLD VENIPUNCTURE: CPT

## 2020-12-04 PROCEDURE — 05HB33Z INSERTION OF INFUSION DEVICE INTO RIGHT BASILIC VEIN, PERCUTANEOUS APPROACH: ICD-10-PCS | Performed by: RADIOLOGY

## 2020-12-04 PROCEDURE — 86901 BLOOD TYPING SEROLOGIC RH(D): CPT

## 2020-12-04 PROCEDURE — 85025 COMPLETE CBC W/AUTO DIFF WBC: CPT

## 2020-12-04 RX ORDER — SODIUM CHLORIDE 0.9 % (FLUSH) 0.9 %
10 SYRINGE (ML) INJECTION 2 TIMES DAILY
Status: DISCONTINUED | OUTPATIENT
Start: 2020-12-04 | End: 2020-12-28

## 2020-12-04 RX ADMIN — SODIUM CHLORIDE, PRESERVATIVE FREE 10 ML: 5 INJECTION INTRAVENOUS at 18:30

## 2020-12-04 NOTE — PROGRESS NOTES
OB/GYN PROGRESS NOTE    Margaux Arredondo is a 30 y.o. female  at [facility] [address] Day: 3    Subjective:   Patient has been seen and examined. Patient is sleeping comfortably in bed, complaining of nothing this morning. She reports she is having some discomfort in her peripheral IV site, and feels some burning sensation every time the IV is flushed. Patient denies any vaginal discharge and any urinary complaints. The patient reports fetal movement is present, denies contractions, denies loss of fluid, denies vaginal bleeding. Patient denies headache, vision changes, nausea, vomiting, fever, chills, shortness of breath, chest pain, RUQ pain, abdominal pain, diarrhea, change in color/amount/odor of vaginal discharge, dysuria or, hematuria.      Objective:   Vitals:  Vitals:    20 2000 20 0648 20 1120 20 1945   BP: 134/65 124/69 126/62 134/79   Pulse: 92 104 95 92   Resp: 18 18 16    Temp: 98.2 °F (36.8 °C) 97.8 °F (36.6 °C) 97.9 °F (36.6 °C) 98.2 °F (36.8 °C)   TempSrc: Oral Oral Oral Oral   Weight:       Height:           FHT: 135, moderate variability, accelerations present, decelerations occasional variable  Contractions: none    Physical Exam:  General appearance:  no apparent distress, alert and cooperative  HEENT: head atraumatic, normocephalic, moist mucous membranes, trachea midline  Neurologic:  alert, oriented, normal speech, no focal findings or movement disorder noted  Lungs:  No increased work of breathing, good air exchange, clear to auscultation bilaterally, no crackles or wheezing  Heart:  regular rate and rhythm and no murmur    Abdomen:  soft, gravid, non-tender, no rebound, guarding, or rigidity, no RUQ or epigastric tenderness, no signs or symptoms of abruption and no signs or symptoms of chorioamnionitis  Extremities:  no calf tenderness, non-edematous extremities   Musculoskeletal: Gross strength equal and intact throughout, no gross abnormalities, range of motion normal in hips, knees, shoulders and spine, CVA tenderness: none  Psychiatric: Mood appropriate, normal affect   Rectal Exam: not indicated  Pelvic Exam: not indicated     DATA:  Labs:   No results found for this or any previous visit (from the past 14 hour(s)).     Diagnostics:   MFM 20: Posterior placenta w/ anterior acessory lobe, Vasa previa, Marginal cord, BPP 8/8, KELECHI 13.59, EFW 2#10 ()     Assessment/Plan:  Silvia Arredondo is a 30 y.o. female  at [place] [address]              - No indication for GBS prophylaxis               - Will need MMR postpartum               - Rhogam: 20; will need Rhogam w/u postpartum              - Influenza vaccination: 20              - Tdap vaccination: 20              - Continue PNV daily, SCDs               - COVID-19 negative on 20      Inpatient management of Vasa Previa              - Afebrile, VSS              - CEFM/TOCO              - Cat I FHT, TOCO no contractions              - CBC and T&S q 3 days, next on 20              - Plan for rescue course of Celestone one week prior to planned delivery              - Patient will need Magnesium sulfate for neuroprotection if delivery is imminent               - C/S consent in chart on 20               - NICU consulted, appreciate recommendations              - MFM US 20: Posterior placenta w/ anterior acessory lobe, Vasa previa, Marginal cord, BPP 8/8, KELECHI 13.59, EFW 2#10 ()               - Plan for repeat MFM US weekly on    - Plan for primary  section at 35 weeks on 20 w/ Dr. Rosalind Freeman     S/P Celestone  and 20      Hx Pulmonary Stenosis S/P Balloon Valvuloplasty              - Patient with a history of congenital pulmonary stenosis s/p balloon valvuloplasty at 1years old              - ECHO 20: mild to moderate pulmonary insufficiency, stable from 2016              - Patient evaluated by Cardiology and recommended follow up in 3 years              - Fetal ECHO 10/26/20 wnl     Marginal Cord Insertion              - Following with MFM     Lagging AC, BPD and HC              - MFM US 11/23/20: BPD, HC and AC < 3rd%ile with overall EFW 27%ile              - NIPT pending      Elevated BP x1 (11/29/20)              - Blood pressures normotensive overnight              - Denies s/s preE      BMI 44.97    Patient Active Problem List    Diagnosis Date Noted    Celestone 12/1/20, 12/2 12/01/2020     Priority: High    31 weeks gestation of pregnancy 12/01/2020    Vasa previa 11/29/2020     Overview Note:     Plan for inpatient management at 32 weeks.  Marginal cord insertion 11/29/2020    Lagging AC  11/29/2020    BMI 44.80 11/29/2020    Elevated x1 (11/29/20) 11/29/2020     Overview Note:     11/29/20: Patient with elevated /75. If patient has another elevated BP > 140/90, she will meet criteria for gHTN.  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 Pt desires care with different 2018 [facility] records/ultrasound/labs  from previous provider from [address] [place] [address].  scanned into media. Pt moved from       Hx Pulmonary Stenosis s/p Valvuloplasty  07/14/2020     Overview Note:     Dx at 2 and underwent a balloon valvuloplasty at age 1 years, performed by Fatmata Lu at [facility]  In [address] [place] [address]. Most recent visit to peds cardiology scanned into media-SK         Will update Dr. Rupa Anderson.      Gabby Garcia DO  Ob/Gyn Resident  12/3/2020, 11:12 PM

## 2020-12-04 NOTE — CARE COORDINATION
Antepartum Care Coordination Note    31 weeks gestation of pregnancy [Z3A.31]    HPI: Writer met w/ patient at bedside and verified name/address/phone number/[employer] insurance correct on facesheet    Patient was sitting up in bed working when CM entered room. She verbalized no needs at this time. No previous home care or dme and no anticipated need for home nursing or dme. CM continue to follow for any DC needs.

## 2020-12-05 PROBLEM — O13.9 GESTATIONAL HYPERTENSION: Status: ACTIVE | Noted: 2020-12-05

## 2020-12-05 LAB
CREATININE URINE: 39.3 MG/DL (ref 28–217)
TOTAL PROTEIN, URINE: 4 MG/DL
URINE TOTAL PROTEIN CREATININE RATIO: 0.1 (ref 0–0.2)

## 2020-12-05 PROCEDURE — 1220000000 HC SEMI PRIVATE OB R&B

## 2020-12-05 PROCEDURE — 2580000003 HC RX 258: Performed by: STUDENT IN AN ORGANIZED HEALTH CARE EDUCATION/TRAINING PROGRAM

## 2020-12-05 PROCEDURE — 96372 THER/PROPH/DIAG INJ SC/IM: CPT

## 2020-12-05 PROCEDURE — 6360000002 HC RX W HCPCS: Performed by: STUDENT IN AN ORGANIZED HEALTH CARE EDUCATION/TRAINING PROGRAM

## 2020-12-05 PROCEDURE — 6370000000 HC RX 637 (ALT 250 FOR IP): Performed by: STUDENT IN AN ORGANIZED HEALTH CARE EDUCATION/TRAINING PROGRAM

## 2020-12-05 RX ORDER — ASPIRIN 81 MG/1
81 TABLET, CHEWABLE ORAL DAILY
Status: DISCONTINUED | OUTPATIENT
Start: 2020-12-05 | End: 2020-12-28

## 2020-12-05 RX ADMIN — SODIUM CHLORIDE, PRESERVATIVE FREE 10 ML: 5 INJECTION INTRAVENOUS at 09:08

## 2020-12-05 RX ADMIN — SODIUM CHLORIDE, PRESERVATIVE FREE 10 ML: 5 INJECTION INTRAVENOUS at 04:49

## 2020-12-05 RX ADMIN — SODIUM CHLORIDE, PRESERVATIVE FREE 10 ML: 5 INJECTION INTRAVENOUS at 21:01

## 2020-12-05 RX ADMIN — ENOXAPARIN SODIUM 40 MG: 40 INJECTION SUBCUTANEOUS at 12:34

## 2020-12-05 RX ADMIN — ASPIRIN 81 MG: 81 TABLET ORAL at 08:00

## 2020-12-05 NOTE — PROGRESS NOTES
OB/GYN PROGRESS NOTE    Silvia Arredondo is a 30 y.o. female  at [address] Day: 5    Subjective:   Patient has been seen and examined. Patient is resting comfortably in bed, complaining of nothing this morning. Patient denies any vaginal discharge and any urinary complaints. The patient reports fetal movement is present, denies contractions, denies loss of fluid, denies vaginal bleeding. Patient denies headache, vision changes, nausea, vomiting, fever, chills, shortness of breath, chest pain, RUQ pain, abdominal pain, diarrhea, change in color/amount/odor of vaginal discharge, dysuria or, hematuria. Patient received a PICC line yesterday.     Objective:   Vitals:  Vitals:    20 1608 20 2035 20 0037 20 0446   BP: 114/66 (!) 110/56 120/65 117/60   Pulse: 87 92 88 87   Resp: 16  18 16   Temp:  99 °F (37.2 °C) 97.9 °F (36.6 °C) 98.2 °F (36.8 °C)   TempSrc:  Oral Oral Oral   Weight:       Height:             FHT: 135, moderate variability, accelerations present, a few variable decelerations noted  Contractions: none    Physical Exam:  General appearance:  no apparent distress, alert and cooperative  HEENT: head atraumatic, normocephalic, moist mucous membranes, trachea midline  Neurologic:  alert, oriented, normal speech, no focal findings or movement disorder noted  Lungs:  No increased work of breathing, good air exchange, clear to auscultation bilaterally, no crackles or wheezing  Heart:  regular rate and rhythm and no murmur    Abdomen:  soft, gravid, non-tender, no rebound, guarding, or rigidity, no RUQ or epigastric tenderness, no signs or symptoms of abruption and no signs or symptoms of chorioamnionitis  Extremities:  no calf tenderness, non-edematous extremities   Musculoskeletal: Gross strength equal and intact throughout, no gross abnormalities, range of motion normal in hips, knees, shoulders and spine, CVA tenderness: none  Psychiatric: Mood appropriate, normal affect   Rectal Exam: not indicated  Pelvic Exam: not indicated       DATA:  Labs:   Recent Results (from the past 24 hour(s))   Protein / Creatinine Ratio, Urine    Collection Time: 20 12:42 AM   Result Value Ref Range    Total Protein, Urine 4 mg/dL    Creatinine, Ur 39.3 28.0 - 217.0 mg/dL    Urine Total Protein Creatinine Ratio 0.10 0.00 - 0.20           Diagnostics:     MFM 20: posterior placenta w/ anterior accessory lobe    Assessment/Plan:  Rissa Brant Arredondo is a 30 y.o. female  at [place] [address]              - No indication for GBS prophylaxis               - Will need MMR postpartum               - Rhogam: 20; will need Rhogam w/u postpartum              - Influenza vaccination: 20              - Tdap vaccination: 20              - Continue PNV daily, SCDs               - COVID-19 negative on 20     Inpatient management of Vasa Previa              - Afebrile, VSS              - CEFM/TOCO- overall Cat I FHT, TOCO no contractions              - CBC and T&S q 3 days, next on 20              - Plan for rescue course of Celestone one week prior to planned delivery,               - Magnesium sulfate for neuroprotection if delivery is imminent               - C/S consent in chart on 20               - NICU consulted, appreciate recommendations              - MFM US 20: Posterior placenta w/ anterior acessory lobe, Vasa previa, Marginal cord, BPP 8/8, KELECHI 13.59, EFW 2#10 ()               - Plan for repeat MFM US weekly on               - Plan for primary  section at 35 weeks on 20 w/ Dr. Sandy Hansen   - PICC line in placed ()     S/P Celestone  and      Hx Pulmonary Stenosis S/P Balloon Valvuloplasty              - Patient with a history of congenital pulmonary stenosis s/p balloon valvuloplasty at 1 YO              - ECHO 20: mild to moderate pulmonary insufficiency, stable from 05/2016              - Patient evaluated by Cardiology and recommended follow up in 3 years              - Fetal ECHO 10/26/20 wnl     Marginal Cord Insertion              - Following with MFM     Lagging AC, BPD and HC              - MFM US 11/23/20: BPD, HC and AC < 3rd%ile with overall EFW 27%ile              - NIPT pending      gHTN (new dx)              - Blood pressures normotensive overnight              - Denies s/s preE    - PreE labs wnl x1, P/C 0.1 (12/5)      Anemia   - Hgb 11.0>10.4   - Clinically asymptomatic    BMI 44.97       Patient Active Problem List    Diagnosis Date Noted    Celestone 12/1/20, 12/2 12/01/2020     Priority: High    31 weeks gestation of pregnancy 12/01/2020    Vasa previa 11/29/2020     Overview Note:     Plan for inpatient management at 32 weeks.  Marginal cord insertion 11/29/2020    Lagging AC  11/29/2020    BMI 44.80 11/29/2020    Elevated x1 (11/29/20) 11/29/2020     Overview Note:     11/29/20: Patient with elevated /75. If patient has another elevated BP > 140/90, she will meet criteria for gHTN.  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 Pt desires care with different 2018 [facility] records/ultrasound/labs  from previous provider from [address] [place] [address].  scanned into media. Pt moved from       Hx Pulmonary Stenosis s/p Valvuloplasty  07/14/2020     Overview Note:     Dx at 2 and underwent a balloon valvuloplasty at age 1 years, performed by Violeta Amaya at [facility]  In [address] [place] [address]. Most recent visit to peds cardiology scanned into [employer]         Will update Dr. Rashmi Monae.      Uday Rea DO  Ob/Gyn Resident  [facility]  12/5/2020 7:03 AM

## 2020-12-06 PROCEDURE — 6370000000 HC RX 637 (ALT 250 FOR IP): Performed by: STUDENT IN AN ORGANIZED HEALTH CARE EDUCATION/TRAINING PROGRAM

## 2020-12-06 PROCEDURE — 2580000003 HC RX 258: Performed by: STUDENT IN AN ORGANIZED HEALTH CARE EDUCATION/TRAINING PROGRAM

## 2020-12-06 PROCEDURE — 1220000000 HC SEMI PRIVATE OB R&B

## 2020-12-06 PROCEDURE — 96372 THER/PROPH/DIAG INJ SC/IM: CPT

## 2020-12-06 PROCEDURE — 6360000002 HC RX W HCPCS: Performed by: STUDENT IN AN ORGANIZED HEALTH CARE EDUCATION/TRAINING PROGRAM

## 2020-12-06 RX ADMIN — SODIUM CHLORIDE, PRESERVATIVE FREE 10 ML: 5 INJECTION INTRAVENOUS at 09:14

## 2020-12-06 RX ADMIN — SODIUM CHLORIDE, PRESERVATIVE FREE 10 ML: 5 INJECTION INTRAVENOUS at 20:22

## 2020-12-06 RX ADMIN — ASPIRIN 81 MG: 81 TABLET ORAL at 09:13

## 2020-12-06 RX ADMIN — SODIUM CHLORIDE, PRESERVATIVE FREE 10 ML: 5 INJECTION INTRAVENOUS at 20:18

## 2020-12-06 RX ADMIN — ENOXAPARIN SODIUM 40 MG: 40 INJECTION SUBCUTANEOUS at 09:13

## 2020-12-06 NOTE — PROGRESS NOTES
Pt seen and examined. Doing well without complaints. Denies N/V/SOB/CP/abn d/c. +FM. No ctx. FHR: 144. ROS: as above. AVSS  Gen: NAD, A&Ox3  Lungs: CTA, good air movement  CV: RRR, S1,2 wnl. Nl apical impulse. No murmurs. Abd: soft, NT, ND  Fundus: soft, NT.  SVE: deferred. Ext: no calf pain, no ed, distal pulses present. FHR reviewed. Cat I. A/P:    26yo IUP at 31w6d admitted sec to vasa previa. S/P Celestone. PICC line in place. F/u with MFM. Continue inpt obs. No acute concerns.

## 2020-12-06 NOTE — FLOWSHEET NOTE
Patient sitting up in bed visiting with her , not tracing EFM, will adjust when patient lays back down.

## 2020-12-06 NOTE — PROGRESS NOTES
OBGYN Resident Progress Note    Maury Arredondo is a 30 y.o. female  at [address] [facility] Day: 6    Subjective:   Patient has been seen and examined. Patient is resting comfortably. Overall has no complaints. Patient denies any headache, visual changes, difficulty breathing, RUQ pain, N/V, F/C, and pain/swelling in lower extremities. Denies any dysuria or vaginal discharge. The patient reports fetal movement is present, denies contractions, denies loss of fluid, denies vaginal bleeding.       Objective:   Vitals:  Vitals:    20 0037 20 0446 20 0818 20 2101   BP: 120/65 117/60 (!) 116/51 128/66   Pulse: 88 87 95 91   Resp: 18 16 16 16   Temp: 97.9 °F (36.6 °C) 98.2 °F (36.8 °C) 98.1 °F (36.7 °C) 98.2 °F (36.8 °C)   TempSrc: Oral Oral  Oral   Weight:       Height:             Fetal Heart Monitor:  Baseline Heart Rate 140, moderate variability, present accelerations, absent decelerations  [place]: contractions, none    General appearance: no apparent distress, alert and cooperative  HEENT: head atraumatic, normocephalic, trachea midline, moist mucous membranes   Neurologic: alert, oriented, normal speech, no focal findings or movement disorder noted   Abdomen: soft, gravid, non-tender on palpation, no right upper quadrant tenderness, uterus non-tender, no signs of abruption and no signs of chorioamnionitis  Extremities:  no calf tenderness bilaterally, non-edematous bilaterally   Musculoskeletal: no gross abnormalities, range of motion appropriate for age   Psychiatric: mood appropriate, normal affect   Pelvic Exam:not indicated     Diagnostics:     MFM US 20: Posterior placenta w/ anterior acessory lobe, Vasa previa, Marginal cord, BPP 8/8, KELECHI 13.59, EFW 2#10 ()     Assessment/Plan:  Maury Arredondo is a 30 y.o. female  at 32w0d IUP      - Rh negative / Rubella non-immune / GBS negative              - No indication for GBS prophylaxis at this time               - Will need MMR postpartum               - Rhogam: 20; will need Rhogam w/u postpartum              - Influenza vaccination: 20              - Tdap vaccination: 20              - Continue PNV QD              - COVID19 neg ()     Inpatient management of Vasa Previa              - VSS              - cEFM/TOCO   - Tracing has looked reassuring               - CBC and T&S q 3 days, next on    - Lovenox 40 mg QD     - PICC line in place    - C/S consent in chart    - NICU consult completed 12/3   - S/p Celestone ,               - Plan for rescue course of Celestone               - Plan for repeat MFM US weekly on               -Primary  section at 35 weeks on 20 scheduled w/ Dr. Jacy Neil Dx Gestational HTN   - ASA 81 mg QD   - Denies any s/s of pre E   - No severe range pressures    - PreE labs were wnl x1 and P/C 0.10 ()     Anemia    - Hgb 11.0>10.4   - CBC every 3 days    - Clinically asymptomatic       Hx Pulmonary Stenosis S/P Balloon Valvuloplasty              - Patient with a history of congenital pulmonary stenosis s/p balloon valvuloplasty at 1years old              - ECHO 20: mild to moderate pulmonary insufficiency, stable from 2016              - Patient evaluated by Cardiology and recommended follow up in 3 years              - Fetal ECHO 10/26/20 wnl      Lagging AC, BPD and HC w/ Marginal Cord Insertion               - MFM US 20: BPD, HC and AC < 3rd%ile with overall EFW 27%ile              - NIPT pending    - Weekly MFM ultrasounds ordered      BMI 44.97   - DVT prophylaxis: SCDs    Patient Active Problem List    Diagnosis Date Noted    Celestone 20, 2020     Priority: High    Gestational hypertension (G1) 2020    31 weeks gestation of pregnancy 2020    Vasa previa 2020     Overview Note:     Plan for inpatient management at 32 weeks.        Marginal cord insertion 2020    Lagging AC 11/29/2020    BMI 44.80 11/29/2020    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 Pt desires care with different 2018 [facility] records/ultrasound/labs  from previous provider from [place].  scanned into media. Pt moved from        Pulmonary Stenosis s/p Valvuloplasty  07/14/2020     Overview Note:     Dx at 2 and underwent a balloon valvuloplasty at age 1 years, performed by Elisha Ji at [facility]  In [facility].    Most recent visit to peds cardiology scanned into [employer]         Attending physician.: Dr Chilango Joseph, DO  Ob/Gyn Resident  [address] [facility], [place]  12/6/2020, 12:12 AM

## 2020-12-07 LAB
ABO/RH: NORMAL
ABO/RH: NORMAL
ABSOLUTE EOS #: 0.11 K/UL (ref 0–0.44)
ABSOLUTE IMMATURE GRANULOCYTE: 0.24 K/UL (ref 0–0.3)
ABSOLUTE LYMPH #: 2.83 K/UL (ref 1.1–3.7)
ABSOLUTE MONO #: 0.69 K/UL (ref 0.1–1.2)
ANTIBODY IDENTIFICATION: NORMAL
ANTIBODY IDENTIFICATION: NORMAL
ANTIBODY SCREEN: NORMAL
ANTIBODY SCREEN: POSITIVE
ARM BAND NUMBER: NORMAL
ARM BAND NUMBER: NORMAL
BASOPHILS # BLD: 0 % (ref 0–2)
BASOPHILS ABSOLUTE: 0.03 K/UL (ref 0–0.2)
BLOOD BANK SPECIMEN: NORMAL
DIFFERENTIAL TYPE: ABNORMAL
EOSINOPHILS RELATIVE PERCENT: 1 % (ref 1–4)
EXPIRATION DATE: NORMAL
EXPIRATION DATE: NORMAL
HCT VFR BLD CALC: 35.2 % (ref 36.3–47.1)
HEMOGLOBIN: 11.3 G/DL (ref 11.9–15.1)
IMMATURE GRANULOCYTES: 2 %
LYMPHOCYTES # BLD: 23 % (ref 24–43)
MCH RBC QN AUTO: 29.2 PG (ref 25.2–33.5)
MCHC RBC AUTO-ENTMCNC: 32.1 G/DL (ref 28.4–34.8)
MCV RBC AUTO: 91 FL (ref 82.6–102.9)
MONOCYTES # BLD: 6 % (ref 3–12)
NRBC AUTOMATED: 0 PER 100 WBC
PDW BLD-RTO: 12.6 % (ref 11.8–14.4)
PLATELET # BLD: 235 K/UL (ref 138–453)
PLATELET ESTIMATE: ABNORMAL
PMV BLD AUTO: 9.8 FL (ref 8.1–13.5)
RBC # BLD: 3.87 M/UL (ref 3.95–5.11)
RBC # BLD: ABNORMAL 10*6/UL
SEG NEUTROPHILS: 68 % (ref 36–65)
SEGMENTED NEUTROPHILS ABSOLUTE COUNT: 8.25 K/UL (ref 1.5–8.1)
WBC # BLD: 12.2 K/UL (ref 3.5–11.3)
WBC # BLD: ABNORMAL 10*3/UL

## 2020-12-07 PROCEDURE — 76821 MIDDLE CEREBRAL ARTERY ECHO: CPT | Performed by: OBSTETRICS & GYNECOLOGY

## 2020-12-07 PROCEDURE — 2580000003 HC RX 258: Performed by: STUDENT IN AN ORGANIZED HEALTH CARE EDUCATION/TRAINING PROGRAM

## 2020-12-07 PROCEDURE — 86900 BLOOD TYPING SEROLOGIC ABO: CPT

## 2020-12-07 PROCEDURE — 76820 UMBILICAL ARTERY ECHO: CPT | Performed by: OBSTETRICS & GYNECOLOGY

## 2020-12-07 PROCEDURE — 1220000000 HC SEMI PRIVATE OB R&B

## 2020-12-07 PROCEDURE — 86901 BLOOD TYPING SEROLOGIC RH(D): CPT

## 2020-12-07 PROCEDURE — 76819 FETAL BIOPHYS PROFIL W/O NST: CPT | Performed by: OBSTETRICS & GYNECOLOGY

## 2020-12-07 PROCEDURE — 36415 COLL VENOUS BLD VENIPUNCTURE: CPT

## 2020-12-07 PROCEDURE — 76817 TRANSVAGINAL US OBSTETRIC: CPT | Performed by: OBSTETRICS & GYNECOLOGY

## 2020-12-07 PROCEDURE — 86870 RBC ANTIBODY IDENTIFICATION: CPT

## 2020-12-07 PROCEDURE — 6370000000 HC RX 637 (ALT 250 FOR IP): Performed by: STUDENT IN AN ORGANIZED HEALTH CARE EDUCATION/TRAINING PROGRAM

## 2020-12-07 PROCEDURE — 6360000002 HC RX W HCPCS: Performed by: STUDENT IN AN ORGANIZED HEALTH CARE EDUCATION/TRAINING PROGRAM

## 2020-12-07 PROCEDURE — 86850 RBC ANTIBODY SCREEN: CPT

## 2020-12-07 PROCEDURE — 85025 COMPLETE CBC W/AUTO DIFF WBC: CPT

## 2020-12-07 PROCEDURE — 76815 OB US LIMITED FETUS(S): CPT | Performed by: OBSTETRICS & GYNECOLOGY

## 2020-12-07 PROCEDURE — 96372 THER/PROPH/DIAG INJ SC/IM: CPT

## 2020-12-07 RX ORDER — HEPARIN SODIUM 5000 [USP'U]/ML
5000 INJECTION, SOLUTION INTRAVENOUS; SUBCUTANEOUS EVERY 12 HOURS
Status: DISCONTINUED | OUTPATIENT
Start: 2020-12-08 | End: 2020-12-27

## 2020-12-07 RX ORDER — HEPARIN SODIUM 5000 [USP'U]/ML
5000 INJECTION, SOLUTION INTRAVENOUS; SUBCUTANEOUS EVERY 12 HOURS
Status: DISCONTINUED | OUTPATIENT
Start: 2020-12-07 | End: 2020-12-07

## 2020-12-07 RX ADMIN — ASPIRIN 81 MG: 81 TABLET ORAL at 08:44

## 2020-12-07 RX ADMIN — SODIUM CHLORIDE, PRESERVATIVE FREE 10 ML: 5 INJECTION INTRAVENOUS at 10:10

## 2020-12-07 RX ADMIN — SODIUM CHLORIDE, PRESERVATIVE FREE 10 ML: 5 INJECTION INTRAVENOUS at 21:35

## 2020-12-07 RX ADMIN — ENOXAPARIN SODIUM 40 MG: 40 INJECTION SUBCUTANEOUS at 10:08

## 2020-12-07 NOTE — PROGRESS NOTES
indicated  Pelvic Exam: not indciated       DATA:    Labs:   Recent Results (from the past 36 hour(s))   CBC WITH AUTO DIFFERENTIAL    Collection Time: 20 12:05 AM   Result Value Ref Range    WBC 12.2 (H) 3.5 - 11.3 k/uL    RBC 3.87 (L) 3.95 - 5.11 m/uL    Hemoglobin 11.3 (L) 11.9 - 15.1 g/dL    Hematocrit 35.2 (L) 36.3 - 47.1 %    MCV 91.0 82.6 - 102.9 fL    MCH 29.2 25.2 - 33.5 pg    MCHC 32.1 28.4 - 34.8 g/dL    RDW 12.6 11.8 - 14.4 %    Platelets 914 056 - 212 k/uL    MPV 9.8 8.1 - 13.5 fL    NRBC Automated 0.0 0.0 per 100 WBC    Differential Type NOT REPORTED     Seg Neutrophils 68 (H) 36 - 65 %    Lymphocytes 23 (L) 24 - 43 %    Monocytes 6 3 - 12 %    Eosinophils % 1 1 - 4 %    Basophils 0 0 - 2 %    Immature Granulocytes 2 (H) 0 %    Segs Absolute 8.25 (H) 1.50 - 8.10 k/uL    Absolute Lymph # 2.83 1.10 - 3.70 k/uL    Absolute Mono # 0.69 0.10 - 1.20 k/uL    Absolute Eos # 0.11 0.00 - 0.44 k/uL    Basophils Absolute 0.03 0.00 - 0.20 k/uL    Absolute Immature Granulocyte 0.24 0.00 - 0.30 k/uL    WBC Morphology NOT REPORTED     RBC Morphology NOT REPORTED     Platelet Estimate NOT REPORTED    TYPE AND SCREEN    Collection Time: 20 12:05 AM   Result Value Ref Range    Expiration Date 12/10/2020,2359     Arm Band Number [id number]     ABO/Rh A NEGATIVE     Antibody Screen NOT TESTED     Antibody ID Anti-D, Passive Due To RhIG    BLOOD BANK SPECIMEN    Collection Time: 20 12:10 AM   Result Value Ref Range    Blood Bank Specimen NOT REPORTED      Diagnostics:   [facility]  Succenturiate lobe, marginal cord and vasa previa all visualized again today.  KELECHI 13.51 cm     MFM : Posterior placenta w/ anterior acessory lobe, Vasa previa, Marginal cord, BPP 8/8, KELECHI 13.59, EFW 2#10 ()     Assessment/Plan:  Irma Arredondo is a 30 y.o. female  at [address] [place] [address]              - No indication for GBS prophylaxis               - Will need MMR postpartum               - Rhogam: 20; will need Rhogam w/u postpartum              - Influenza vaccination: 20              - Tdap vaccination: 20              - Continue PNV daily, SCDs               - COVID-19 negative on 20      Inpatient management of Vasa Previa              - Afebrile, VSS              - CEFM/TOCO              - Cat I FHT, TOCO no contractions              - CBC and T&S q 3 days, next on 12/10/20   - Diet: general               - DVT Prophylaxis: Heparin 5000U BID and SCDs   - Right Midline placed 20               - C/S consent in chart on 20               - NICU consult completed on 12/3/20              - MFM 20: Succenturiate lobe, marginal cord and vasa previa all visualized again today,  BPP, KELECHI 13.51 cm, EFW 2#10 ()               - Plan for repeat MFM US weekly on    - S/P Celestone on  and 20, plan for rescue course of Celestone on 20              - Plan for primary  section at 35 weeks on 20 w/ Dr. Benny Wills     S/P Celestone  and 20     Gestational Hypertension (newly diagnosed on admission)   - Blood pressures stable overnight, no severe range blood pressures   - PreE labs wnl x1; P/C ratio 0.10 on 20   - Denies s/s preE   - Continue ASA 81 mg qd      Hx Pulmonary Stenosis S/P Balloon Valvuloplasty              - Patient with a history of congenital pulmonary stenosis s/p balloon valvuloplasty at 1years old              - ECHO 20: mild to moderate pulmonary insufficiency, stable from 2016              - Patient evaluated by Cardiology and recommended follow up in 3 years              - Fetal ECHO 10/26/20 wnl     Marginal Cord Insertion              - Following with MFM     Lagging AC, BPD and HC              - MFM US 20: BPD, HC and AC < 3rd%ile with overall EFW 27%ile              - NIPT pending     Anemia   - Hgb 11.0 on admission > 10.4 on 20    - CBC every 3 days   - Clinically asymptomatic      BMI 44.97    Patient Active Problem List    Diagnosis Date Noted    Celestone 12/1/20, 12/2 12/01/2020     Priority: High    Placenta marginalis in third trimester     Placenta succenturiata in third trimester     Poor fetal growth complicating pregnancy, antepartum     Gestational hypertension (G1) 12/05/2020    31 weeks gestation of pregnancy 12/01/2020    Vasa previa 11/29/2020     Overview Note:     Plan for inpatient management at 32 weeks.  Marginal cord insertion 11/29/2020    Lagging AC  11/29/2020    BMI 44.80 11/29/2020    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 Pt desires care with different 2018 [facility] records/ultrasound/labs  from previous provider from [address] [place] [address].  scanned into media. Pt moved from        Pulmonary Stenosis s/p Valvuloplasty  07/14/2020     Overview Note:     Dx at 2 and underwent a balloon valvuloplasty at age 1 years, performed by Aureliano Valladares at [facility]  In [facility].    Most recent visit to peds cardiology scanned into [employer]         Will update Dr. Keyur Dietrich DO  Ob/Gyn Resident  12/8/2020, 6:45 AM

## 2020-12-07 NOTE — PROGRESS NOTES
Pt sitting in bed. No C/o. Denies any abn d/c, LOF, spotting, bleeding, CP, SOB, abd pain. +FM. Denies cramping or ctx. Fundus is soft. FHR: 146. ROS: as above. AVSS  Gen: NAD, A&Ox3  Lungs: CTA, good air movement  CV: RRR, S1,2 wnl. Nl apical impulse. No murmurs. Abd: soft, NT, ND  Fundus: soft. SVE: deferred. Ext: no calf pain, no ed, distal pulses present. FHR: cat I. A/P:    26yo at 32w0d. H/o vasa previa. Asymptomatic at this time. No acute concerns. Cont inpt obs with MFM mgmt.

## 2020-12-07 NOTE — PROGRESS NOTES
OB/GYN PROGRESS NOTE    Rosario Arredondo is a 30 y.o. female  [facility] [address] [facility] Day: 7    Subjective:   Patient has been seen and examined. Patient is resting comfortably in bed, complaining of nothing this morning. Patient denies any vaginal discharge and any urinary complaints. The patient reports fetal movement is present, denies contractions, denies loss of fluid, denies vaginal bleeding. Patient denies headache, vision changes, nausea, vomiting, fever, chills, shortness of breath, chest pain, RUQ pain, abdominal pain, diarrhea, change in color/amount/odor of vaginal discharge, dysuria or, hematuria.      Objective:   Vitals:  Vitals:    20 0650 20 1208 20 2018 20 0844   BP: 130/66 134/68 136/64 128/69   Pulse: 97 92 92 94   Resp:  16 20   Temp:  98.9 °F (37.2 °C) 98.1 °F (36.7 °C) 97.9 °F (36.6 °C)   TempSrc:   Oral Oral   Weight:       Height:           FHT: 135, moderate variability, accelerations present, decelerations absent  Contractions: none    Physical Exam:  General appearance:  no apparent distress, alert and cooperative  HEENT: head atraumatic, normocephalic, moist mucous membranes, trachea midline  Neurologic:  alert, oriented, normal speech, no focal findings or movement disorder noted  Lungs:  No increased work of breathing, good air exchange, clear to auscultation bilaterally, no crackles or wheezing  Heart:  regular rate and rhythm and no murmur    Abdomen:  soft, gravid, non-tender, no rebound, guarding, or rigidity, no RUQ or epigastric tenderness, no signs or symptoms of abruption and no signs or symptoms of chorioamnionitis  Extremities:  no calf tenderness, non-edematous bilateral lower extremities   Musculoskeletal: Gross strength equal and intact throughout, no gross abnormalities, range of motion normal in hips, knees, shoulders and spine, CVA tenderness: none  Psychiatric: Mood appropriate, normal affect   Rectal Exam: not indicated  Pelvic Exam: not indciated       DATA:  Diagnostics:   MFM : Posterior placenta w/ anterior acessory lobe, Vasa previa, Marginal cord, BPP 8/8, KELECHI 13.59, EFW 2#10 ()     Assessment/Plan:  Diana Arredondo is a 30 y.o. female  at [address] [place] [address]              - No indication for GBS prophylaxis               - Will need MMR postpartum               - Rhogam: 20; will need Rhogam w/u postpartum              - Influenza vaccination: 20              - Tdap vaccination: 20              - Continue PNV daily, SCDs               - COVID-19 negative on 20      Inpatient management of Vasa Previa              - Afebrile, VSS              - CEFM/TOCO              - Cat I FHT, TOCO no contractions              - CBC and T&S q 3 days, next on 20   - Diet: general               - DVT Prophylaxis: Lovenox 40 mg qd and SCDs   - Right Midline placed 20               - C/S consent in chart on 20               - NICU consult completed on 12/3/20              - MFM US 20: Posterior placenta w/ anterior acessory lobe, Vasa previa, Marginal cord, BPP 8/8, KELECHI 13.59, EFW 2#10 ()               - Plan for repeat MFM US weekly on    - S/P Celestone on  and 20, plan for rescue course of Celestone on 20              - Plan for primary  section at 35 weeks on 20 w/ Dr. Pierre Jean     S/P Celestone  and 20     Gestational Hypertension (newly diagnosed on admission)   - Blood pressures stable overnight, no severe range blood pressures   - PreE labs wnl x1; P/C ratio 0.10 on 20   - Denies s/s preE   - Continue ASA 81 mg qd      Hx Pulmonary Stenosis S/P Balloon Valvuloplasty              - Patient with a history of congenital pulmonary stenosis s/p balloon valvuloplasty at 1years old              - ECHO 20: mild to moderate pulmonary insufficiency, stable from 2016              - Patient evaluated by Cardiology and recommended follow up in 3 years              - Fetal ECHO 10/26/20 wnl     Marginal Cord Insertion              - Following with MFM     Lagging AC, BPD and HC              - MFM US 11/23/20: BPD, HC and AC < 3rd%ile with overall EFW 27%ile              - NIPT pending     Anemia   - Hgb 11.0 on admission > 10.4 on 12/7/20    - CBC every 3 days   - Clinically asymptomatic      BMI 44.97    Patient Active Problem List    Diagnosis Date Noted    Celestone 12/1/20, 12/2 12/01/2020     Priority: High    Placenta marginalis in third trimester     Placenta succenturiata in third trimester     Poor fetal growth complicating pregnancy, antepartum     Gestational hypertension (G1) 12/05/2020    31 weeks gestation of pregnancy 12/01/2020    Vasa previa 11/29/2020     Overview Note:     Plan for inpatient management at 32 weeks.  Marginal cord insertion 11/29/2020    Lagging AC  11/29/2020    BMI 44.80 11/29/2020    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 Pt desires care with different 2018 [facility] records/ultrasound/labs  from previous provider from [address] [place] [address].  scanned into media. Pt moved from       Hx Pulmonary Stenosis s/p Valvuloplasty  07/14/2020     Overview Note:     Dx at 2 and underwent a balloon valvuloplasty at age 1 years, performed by Leonardo Fenton at [facility]  In [address] [place] [address].    Most recent visit to peds cardiology scanned into [employer]         Will update Dr. Gage Phillips DO  Ob/Gyn Resident  12/7/2020, 9:08 AM

## 2020-12-07 NOTE — CARE COORDINATION
ANTEPARTUM CARE COORDINATION NOTE    Reason for Admission: 31 weeks gestation of pregnancy [Z3A.31]    HPI: 30 y.o. female  at [facility] 26. Day: 6    MFM ultrasound : Succenturiate lobe, marginal cord and vasa previa all visualized again today. KELECHI 13.51 cm.     Treatment Plan of Care:    - Rh negative / Rubella non-immune / GBS pending              - No indication for GBS prophylaxis               - Will need MMR postpartum               - Rhogam: 20; will need Rhogam w/u postpartum              - Influenza vaccination: 20              - Tdap vaccination: 20              - Continue PNV daily, SCDs               - COVID-19 negative on 20   Inpatient management of Vasa Previa              - Afebrile, VSS              - CEFM/TOCO              - Cat I FHT, TOCO no contractions              - CBC and T&S q 3 days, next on 20              - Diet: general               - DVT Prophylaxis: Lovenox 40 mg qd and SCDs              - Right Midline placed 20               - C/S consent in chart on 20               - NICU consult completed on 12/3/20              - MFM US 20: Posterior placenta w/ anterior acessory lobe, Vasa previa, Marginal cord, BPP 8/8, KELECHI 13.59, EFW 2#10 ()               - Plan for repeat MFM US weekly on               - S/P Celestone on  and 20, plan for rescue course of Celestone on 20         S/P Celestone  and 20   Gestational Hypertension (newly diagnosed on admission)              - Blood pressures stable overnight, no severe range blood pressures              - PreE labs wnl x1; P/C ratio 0.10 on 20              - Denies s/s preE              - Continue ASA 81 mg qd   Hx Pulmonary Stenosis S/P Balloon Valvuloplasty              - Patient with a history of congenital pulmonary stenosis s/p balloon valvuloplasty at 1years old              - ECHO 20: mild to moderate pulmonary insufficiency, stable from 2016              - Patient evaluated by Cardiology and recommended follow up in 3 years              - Fetal ECHO 10/26/20 wnl  Marginal Cord Insertion              - Following with MFM  Lagging AC, BPD and HC              - MFM US 20: BPD, HC and AC < 3rd%ile with overall EFW 27%ile              - NIPT pending   Anemia              - Hgb 11.0 on admission > 10.4 on 20               - CBC every 3 days              - Clinically asymptomatic   BMI 44.97    Plan for primary  section at 35 weeks on 20 w/ Dr. Shiraz Koehler

## 2020-12-07 NOTE — PROGRESS NOTES
Obstetric/Gynecology Maternal Fetal Medicine Resident Note    Patient scanned at [place] [address] office today. Succenturiate lobe, marginal cord and vasa previa all visualized again today. KELECHI 13.51 cm. Continue current care. Dr. Jane Aguilar and L&D team updated.      Cayetano Ridley DO  OBGYMYESHA Resident, PGY2  [facility]  12/7/2020, 8:31 AM

## 2020-12-07 NOTE — PROGRESS NOTES
OB/GYN PROGRESS NOTE    Maurilio Arredondo is a 30 y.o. female  at [place] [address]. Day: 6    Subjective:   Patient has been seen and examined. Patient is resting comfortably in bed, complaining of nothing this morning. Patient denies any vaginal discharge and any urinary complaints. The patient reports fetal movement is present, denies contractions, denies loss of fluid, denies vaginal bleeding. Patient denies headache, vision changes, nausea, vomiting, fever, chills, shortness of breath, chest pain, RUQ pain, abdominal pain, diarrhea, change in color/amount/odor of vaginal discharge, dysuria or, hematuria.      Objective:   Vitals:  Vitals:    20 2101 20 0650 20 1208 20   BP: 128/66 130/66 134/68 136/64   Pulse: 91 97 92 92   Resp: 16 16 16 16   Temp: 98.2 °F (36.8 °C)  98.9 °F (37.2 °C) 98.1 °F (36.7 °C)   TempSrc: Oral   Oral   Weight:       Height:           FHT: 135, moderate variability, accelerations present, decelerations absent  Contractions: none    Physical Exam:  General appearance:  no apparent distress, alert and cooperative  HEENT: head atraumatic, normocephalic, moist mucous membranes, trachea midline  Neurologic:  alert, oriented, normal speech, no focal findings or movement disorder noted  Lungs:  No increased work of breathing, good air exchange, clear to auscultation bilaterally, no crackles or wheezing  Heart:  regular rate and rhythm and no murmur    Abdomen:  soft, gravid, non-tender, no rebound, guarding, or rigidity, no RUQ or epigastric tenderness, no signs or symptoms of abruption and no signs or symptoms of chorioamnionitis  Extremities:  no calf tenderness, non-edematous bilateral lower extremities   Musculoskeletal: Gross strength equal and intact throughout, no gross abnormalities, range of motion normal in hips, knees, shoulders and spine, CVA tenderness: none  Psychiatric: Mood appropriate, normal affect   Rectal Exam: not indicated  Pelvic Exam: not indciated       DATA:  Diagnostics:   MFM : Posterior placenta w/ anterior acessory lobe, Vasa previa, Marginal cord, BPP 8/8, KELECHI 13.59, EFW 2#10 ()     Assessment/Plan:  Kevin Arredondo is a 30 y.o. female  at [address]              - No indication for GBS prophylaxis               - Will need MMR postpartum               - Rhogam: 20; will need Rhogam w/u postpartum              - Influenza vaccination: 20              - Tdap vaccination: 20              - Continue PNV daily, SCDs               - COVID-19 negative on 20      Inpatient management of Vasa Previa              - Afebrile, VSS              - CEFM/TOCO              - Cat I FHT, TOCO no contractions              - CBC and T&S q 3 days, next on 20   - Diet: general               - DVT Prophylaxis: Lovenox 40 mg qd and SCDs   - Right Midline placed 20               - C/S consent in chart on 20               - NICU consult completed on 12/3/20              - MFM US 20: Posterior placenta w/ anterior acessory lobe, Vasa previa, Marginal cord, BPP 8/8, KELECHI 13.59, EFW 2#10 ()               - Plan for repeat MFM US weekly on    - S/P Celestone on  and 20, plan for rescue course of Celestone on 20              - Plan for primary  section at 35 weeks on 20 w/ Dr. Koroma Horse     S/P Celestone  and 20     Gestational Hypertension (newly diagnosed on admission)   - Blood pressures stable overnight, no severe range blood pressures   - PreE labs wnl x1; P/C ratio 0.10 on 20   - Denies s/s preE   - Continue ASA 81 mg qd      Hx Pulmonary Stenosis S/P Balloon Valvuloplasty              - Patient with a history of congenital pulmonary stenosis s/p balloon valvuloplasty at 1years old              - ECHO 20: mild to moderate pulmonary insufficiency, stable from 2016              - Patient evaluated 92 92   Resp: 16 16 16 16   Temp: 98.2 °F (36.8 °C)  98.9 °F (37.2 °C) 98.1 °F (36.7 °C)   TempSrc: Oral   Oral   Weight:       Height:         Recent Results (from the past 24 hour(s))   CBC WITH AUTO DIFFERENTIAL    Collection Time: 12/07/20 12:05 AM   Result Value Ref Range    WBC 12.2 (H) 3.5 - 11.3 k/uL    RBC 3.87 (L) 3.95 - 5.11 m/uL    Hemoglobin 11.3 (L) 11.9 - 15.1 g/dL    Hematocrit 35.2 (L) 36.3 - 47.1 %    MCV 91.0 82.6 - 102.9 fL    MCH 29.2 25.2 - 33.5 pg    MCHC 32.1 28.4 - 34.8 g/dL    RDW 12.6 11.8 - 14.4 %    Platelets 741 074 - 855 k/uL    MPV 9.8 8.1 - 13.5 fL    NRBC Automated 0.0 0.0 per 100 WBC    Differential Type NOT REPORTED     Seg Neutrophils 68 (H) 36 - 65 %    Lymphocytes 23 (L) 24 - 43 %    Monocytes 6 3 - 12 %    Eosinophils % 1 1 - 4 %    Basophils 0 0 - 2 %    Immature Granulocytes 2 (H) 0 %    Segs Absolute 8.25 (H) 1.50 - 8.10 k/uL    Absolute Lymph # 2.83 1.10 - 3.70 k/uL    Absolute Mono # 0.69 0.10 - 1.20 k/uL    Absolute Eos # 0.11 0.00 - 0.44 k/uL    Basophils Absolute 0.03 0.00 - 0.20 k/uL    Absolute Immature Granulocyte 0.24 0.00 - 0.30 k/uL    WBC Morphology NOT REPORTED     RBC Morphology NOT REPORTED     Platelet Estimate NOT REPORTED    TYPE AND SCREEN    Collection Time: 12/07/20 12:05 AM   Result Value Ref Range    Expiration Date 12/10/2020,2359     Arm Band Number [id number]     ABO/Rh A NEGATIVE     Antibody Screen NOT TESTED     Antibody ID Anti-D, Passive Due To RhIG    BLOOD BANK SPECIMEN    Collection Time: 12/07/20 12:10 AM   Result Value Ref Range    Blood Bank Specimen NOT REPORTED      Rhogam given   Vasa Previa   - C/S scheduled   - MFM ultrasound weekly   - Rescue steroids scheduled  gHTN   - normotensive   - no s/s of preeclampsia  Hx of congenital pulmonary stenosis   - ECHO has been stable   - Fetal Echo normal    TRACIE Neri, DO

## 2020-12-08 PROCEDURE — 1220000000 HC SEMI PRIVATE OB R&B

## 2020-12-08 PROCEDURE — 96372 THER/PROPH/DIAG INJ SC/IM: CPT

## 2020-12-08 PROCEDURE — 6360000002 HC RX W HCPCS: Performed by: STUDENT IN AN ORGANIZED HEALTH CARE EDUCATION/TRAINING PROGRAM

## 2020-12-08 PROCEDURE — 2580000003 HC RX 258: Performed by: STUDENT IN AN ORGANIZED HEALTH CARE EDUCATION/TRAINING PROGRAM

## 2020-12-08 PROCEDURE — 6370000000 HC RX 637 (ALT 250 FOR IP): Performed by: STUDENT IN AN ORGANIZED HEALTH CARE EDUCATION/TRAINING PROGRAM

## 2020-12-08 RX ADMIN — ASPIRIN 81 MG: 81 TABLET ORAL at 08:55

## 2020-12-08 RX ADMIN — HEPARIN SODIUM 5000 UNITS: 5000 INJECTION INTRAVENOUS; SUBCUTANEOUS at 08:55

## 2020-12-08 RX ADMIN — HEPARIN SODIUM 5000 UNITS: 5000 INJECTION INTRAVENOUS; SUBCUTANEOUS at 20:55

## 2020-12-08 RX ADMIN — SODIUM CHLORIDE, PRESERVATIVE FREE 10 ML: 5 INJECTION INTRAVENOUS at 20:56

## 2020-12-08 RX ADMIN — SODIUM CHLORIDE, PRESERVATIVE FREE 10 ML: 5 INJECTION INTRAVENOUS at 08:55

## 2020-12-08 NOTE — PROGRESS NOTES
Nutrition Assessment     Type and Reason for Visit: Initial(length of stay)    Nutrition Recommendations/Plan:   -Continue with current diet    Nutrition Assessment:  Currently 32 2/7wks IUP. Tolerating General diet, taking in adequate nutrition. No nutrition concerns reported at this time. Deemed at low nutrition risk. Will f/u based on length of stay      Current Nutrition Therapies:    DIET GENERAL; Anthropometric Measures:  · Height: 5' 4\" (162.6 cm)    Nutrition Diagnosis:   No nutrition diagnosis at this time     Nutrition Interventions:   Food and/or Nutrient Delivery:  Continue Current Diet  Nutrition Education/Counseling:  Education not indicated   Coordination of Nutrition Care:  Continue to monitor while inpatient    Nutrition Monitoring and Evaluation:   Food/Nutrient Intake Outcomes:  Food and Nutrient Intake  Physical Signs/Symptoms Outcomes:  Meal Time Behavior     Discharge Planning:     Too soon to determine     Electronically signed by Marylin Kehr, RD, LD on 12/8/20 at 3:39 PM EST    Contact: [phone]

## 2020-12-09 PROCEDURE — 6360000002 HC RX W HCPCS: Performed by: STUDENT IN AN ORGANIZED HEALTH CARE EDUCATION/TRAINING PROGRAM

## 2020-12-09 PROCEDURE — 1220000000 HC SEMI PRIVATE OB R&B

## 2020-12-09 PROCEDURE — 6370000000 HC RX 637 (ALT 250 FOR IP): Performed by: STUDENT IN AN ORGANIZED HEALTH CARE EDUCATION/TRAINING PROGRAM

## 2020-12-09 PROCEDURE — 96372 THER/PROPH/DIAG INJ SC/IM: CPT

## 2020-12-09 PROCEDURE — 2580000003 HC RX 258: Performed by: STUDENT IN AN ORGANIZED HEALTH CARE EDUCATION/TRAINING PROGRAM

## 2020-12-09 RX ADMIN — SODIUM CHLORIDE, PRESERVATIVE FREE 10 ML: 5 INJECTION INTRAVENOUS at 20:59

## 2020-12-09 RX ADMIN — HEPARIN SODIUM 5000 UNITS: 5000 INJECTION INTRAVENOUS; SUBCUTANEOUS at 09:21

## 2020-12-09 RX ADMIN — ASPIRIN 81 MG: 81 TABLET ORAL at 09:00

## 2020-12-09 RX ADMIN — SODIUM CHLORIDE, PRESERVATIVE FREE 10 ML: 5 INJECTION INTRAVENOUS at 09:21

## 2020-12-09 RX ADMIN — HEPARIN SODIUM 5000 UNITS: 5000 INJECTION INTRAVENOUS; SUBCUTANEOUS at 21:01

## 2020-12-09 NOTE — PROGRESS NOTES
OB/GYN PROGRESS NOTE    Francois Arredondo is a 30 y.o. female  at [address] Day: 9    Subjective:   Patient has been seen and examined. Patient is resting comfortably in bed, complaining of nothing this morning. Patient denies any vaginal discharge and any urinary complaints. The patient reports fetal movement is present, denies contractions, denies loss of fluid, denies vaginal bleeding. Patient denies headache, vision changes, nausea, vomiting, fever, chills, shortness of breath, chest pain, RUQ pain, abdominal pain, diarrhea, change in color/amount/odor of vaginal discharge, dysuria or, hematuria.      Objective:   Vitals:  Vitals:    20 0758 20 1239 20 1649 20   BP: (!) 112/57 139/68 128/72 135/80   Pulse: 93 92 91 98   Resp:    Temp: 98.2 °F (36.8 °C) 98.4 °F (36.9 °C) 98.5 °F (36.9 °C) 98.5 °F (36.9 °C)   TempSrc: Oral Oral  Oral   Weight:       Height:           FHT: 130,  moderate variability, accelerations present, decelerations absent  Contractions: none    Physical Exam:  General appearance:  no apparent distress, alert and cooperative  HEENT: head atraumatic, normocephalic, moist mucous membranes, trachea midline  Neurologic:  alert, oriented, normal speech, no focal findings or movement disorder noted  Lungs:  No increased work of breathing, good air exchange, clear to auscultation bilaterally, no crackles or wheezing  Heart:  regular rate and rhythm and no murmur    Abdomen:  soft, gravid, non-tender, no rebound, guarding, or rigidity, no RUQ or epigastric tenderness, no signs or symptoms of abruption and no signs or symptoms of chorioamnionitis  Extremities:  no calf tenderness, non-edematous bilateral lower extremities   Musculoskeletal: Gross strength equal and intact throughout, no gross abnormalities, range of motion normal in hips, knees, shoulders and spine, CVA tenderness: none  Psychiatric: Mood appropriate, normal affect   Rectal Exam: not indicated  Pelvic Exam: not indciated       DATA:    Labs:   No results found for this or any previous visit (from the past 36 hour(s)).   Diagnostics:   MFM  Succenturiate lobe, marginal cord and vasa previa all visualized again today, BPP 8/8, KELECHI 13.51 cm     MFM : Posterior placenta w/ anterior acessory lobe, Vasa previa, Marginal cord, BPP 8/8, KELECHI 13.59, EFW 2#10 ()     Assessment/Plan:  Quynh Arredondo is a 30 y.o. female  at [address] [place] [address]              - No indication for GBS prophylaxis               - Will need MMR postpartum               - Rhogam: 20; will need Rhogam w/u postpartum              - Influenza vaccination: 20              - Tdap vaccination: 20              - Continue PNV daily, SCDs               - COVID-19 negative on 20      Inpatient management of Vasa Previa              - Afebrile, VSS              - CEFM/TOCO              - Cat I FHT, TOCO no contractions              - CBC and T&S q 3 days, next on 12/10/20   - Diet: general               - DVT Prophylaxis: Heparin 5000U BID and SCDs   - Right Midline placed 20               - C/S consent in chart on 20               - NICU consult completed on 12/3/20              - MFM 20: Succenturiate lobe, marginal cord and vasa previa all visualized again today, 8/8 BPP, KELECHI 13.51 cm, EFW 2#10 ()               - Plan for repeat MFM US weekly on    - S/P Celestone on  and 20, plan for rescue course of Celestone on 20              - Plan for primary  section at 35 weeks on 20 w/ Dr. Heidi Hines     S/P Celestone  and 20     Gestational Hypertension (newly diagnosed on admission)   - Blood pressures stable overnight, no severe range blood pressures   - PreE labs wnl x1; P/C ratio 0.10 on 20   - Denies s/s preE   - Continue ASA 81 mg qd      Hx Pulmonary Stenosis S/P Balloon Valvuloplasty              - Patient with a history of congenital pulmonary stenosis s/p balloon valvuloplasty at 1years old              - ECHO 6/23/20: mild to moderate pulmonary insufficiency, stable from 05/2016              - Patient evaluated by Cardiology and recommended follow up in 3 years              - Fetal ECHO 10/26/20 wnl     Marginal Cord Insertion              - Following with MFM     Lagging AC, BPD and HC              - MFM US 11/23/20: BPD, HC and AC < 3rd%ile with overall EFW 27%ile              - NIPT pending     Anemia   - Hgb 11.0 on admission > 10.4 on 12/7/20    - CBC every 3 days   - Clinically asymptomatic      BMI 44.97    Patient Active Problem List    Diagnosis Date Noted    Celestone 12/1/20, 12/2 12/01/2020     Priority: High    Placenta marginalis in third trimester     Placenta succenturiata in third trimester     Poor fetal growth complicating pregnancy, antepartum     Gestational hypertension (G1) 12/05/2020    31 weeks gestation of pregnancy 12/01/2020    Vasa previa 11/29/2020     Overview Note:     Plan for inpatient management at 32 weeks.  Marginal cord insertion 11/29/2020    Lagging AC  11/29/2020    BMI 44.80 11/29/2020    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 Pt desires care with different [address] [place] [address] records/ultrasound/labs  from previous provider from [address] [place] [address].  scanned into media. Pt moved from       Hx Pulmonary Stenosis s/p Valvuloplasty  07/14/2020     Overview Note:     Dx at 2 and underwent a balloon valvuloplasty at age 1 years, performed by Jeannette Burnett at [facility]  In [address] [place] [address]. Most recent visit to peds cardiology scanned into [employer]         Will update Dr. Lynne Mejia DO  Ob/Gyn Resident  12/9/2020, 1:30 AM             Attending Physician Statement  I have discussed the care of [facility], including pertinent history and exam findings,  with the resident.  I have seen and examined the patient and the key elements of all parts of the encounter have been performed by me. I agree with the assessment, plan and orders as documented by the resident. (GC Modifier)     Pt seen and examined. Plan of care discussed in detail and all questions answered. Denies VB, CTX, LOF.  +FM. Vitals:    20 1649 20 1955 20 0834 20 2105   BP: 128/72 135/80 134/73 121/67   Pulse: 91 98 103 87   Resp:  16 18   Temp: 98.5 °F (36.9 °C) 98.5 °F (36.9 °C) 98.2 °F (36.8 °C)    TempSrc:  Oral     Weight:       Height:         No results found for this or any previous visit (from the past 24 hour(s)).  @ 32w4d   - MFM q weekly   - Heparin BID   - Vitals per protocol   - Intermittent fetal monitoring is fine if tracing reassuring.   Rhogam given   Vasa Previa              - C/S scheduled              - MFM ultrasound weekly              - Rescue steroids scheduled  gHTN              - normotensive              - no s/s of preeclampsia  Hx of congenital pulmonary stenosis              - ECHO has been stable              - Fetal Echo normal     TRACIE Neri, DO

## 2020-12-10 PROCEDURE — 96372 THER/PROPH/DIAG INJ SC/IM: CPT

## 2020-12-10 PROCEDURE — 1220000000 HC SEMI PRIVATE OB R&B

## 2020-12-10 PROCEDURE — 85025 COMPLETE CBC W/AUTO DIFF WBC: CPT

## 2020-12-10 PROCEDURE — 86850 RBC ANTIBODY SCREEN: CPT

## 2020-12-10 PROCEDURE — 2580000003 HC RX 258: Performed by: STUDENT IN AN ORGANIZED HEALTH CARE EDUCATION/TRAINING PROGRAM

## 2020-12-10 PROCEDURE — 86901 BLOOD TYPING SEROLOGIC RH(D): CPT

## 2020-12-10 PROCEDURE — 86900 BLOOD TYPING SEROLOGIC ABO: CPT

## 2020-12-10 PROCEDURE — 6360000002 HC RX W HCPCS: Performed by: STUDENT IN AN ORGANIZED HEALTH CARE EDUCATION/TRAINING PROGRAM

## 2020-12-10 PROCEDURE — 6370000000 HC RX 637 (ALT 250 FOR IP): Performed by: STUDENT IN AN ORGANIZED HEALTH CARE EDUCATION/TRAINING PROGRAM

## 2020-12-10 PROCEDURE — 99232 SBSQ HOSP IP/OBS MODERATE 35: CPT | Performed by: OBSTETRICS & GYNECOLOGY

## 2020-12-10 RX ADMIN — SODIUM CHLORIDE, PRESERVATIVE FREE 10 ML: 5 INJECTION INTRAVENOUS at 08:42

## 2020-12-10 RX ADMIN — HEPARIN SODIUM 5000 UNITS: 5000 INJECTION INTRAVENOUS; SUBCUTANEOUS at 21:08

## 2020-12-10 RX ADMIN — SODIUM CHLORIDE, PRESERVATIVE FREE 10 ML: 5 INJECTION INTRAVENOUS at 21:08

## 2020-12-10 RX ADMIN — ASPIRIN 81 MG: 81 TABLET ORAL at 08:42

## 2020-12-10 RX ADMIN — HEPARIN SODIUM 5000 UNITS: 5000 INJECTION INTRAVENOUS; SUBCUTANEOUS at 08:42

## 2020-12-10 ASSESSMENT — PAIN SCALES - GENERAL: PAINLEVEL_OUTOF10: 0

## 2020-12-10 NOTE — FLOWSHEET NOTE
Writer at bedside; pt states she has a meeting at 1500, would like to shower at 1600 and then agreeable to midline dressing change. Pt offers no complaints at this time. Writer instructs pt to call out when she is ready to shower.

## 2020-12-10 NOTE — PROGRESS NOTES
OB/GYN PROGRESS NOTE    Sherry Arredondo is a 30 y.o. female  at [facility] 13 Day: 10    Subjective:   Patient has been seen and examined. Patient is resting comofrtably, complaining of nothing at this time. Patient denies any vaginal discharge and any urinary complaints. The patient reports fetal movement is present, denies contractions, denies loss of fluid, denies vaginal bleeding. Patient denies headache, vision changes, nausea, vomiting, fever, chills, shortness of breath, chest pain, RUQ pain, abdominal pain, diarrhea, change in color/amount/odor of vaginal discharge, dysuria or, hematuria.      Objective:   Vitals:  Vitals:    20 1649 20 1955 20 0834 20 2105   BP: 128/72 135/80 134/73 121/67   Pulse: 91 98 103 87   Resp:    Temp: 98.5 °F (36.9 °C) 98.5 °F (36.9 °C) 98.2 °F (36.8 °C)    TempSrc:  Oral     Weight:       Height:            FHT: 135, moderate variability, accelerations present, decelerations absent  Contractions: none    Physical Exam:  General appearance:  no apparent distress, alert and cooperative  HEENT: head atraumatic, normocephalic, moist mucous membranes, trachea midline  Neurologic:  alert, oriented, normal speech, no focal findings or movement disorder noted  Lungs:  No increased work of breathing, good air exchange, clear to auscultation bilaterally, no crackles or wheezing  Heart:  regular rate and rhythm    Abdomen:  soft, gravid and non-tender  Extremities:  no calf tenderness  Musculoskeletal: Gross strength equal and intact throughout, no gross abnormalities, range of motion normal in hips, knees, shoulders and spine, CVA tenderness: none  Psychiatric: Mood appropriate, normal affect   Rectal Exam: not indicated  Pelvic Exam:    Diagnostics:     ECHO 20: Mild to moderate pulmonary insufficiency stable from 2016     [facility] : Posterior placenta w/ anterior acessory lobe, Vasa previa, Marginal cord,   BPP 8/8, KELECHI 13.59, EFW 2#10 ()     MFM : Succenturiate lobe, marginal cord and vasa previa all visualized again today. KELECHI 13.51 cm ECHO 20: Mild to moderate pulmonary insufficiency stable from 2016     Assessment/Plan:  Francois Arredondo is a 30 y.o. female  at [address] [place] [address]   - Rh neg/ Rubella NI/ GBS neg   - No indication  for GBS prophylaxis    - Rhogam: 20; Rhogam hdz PP   - Influenza vaccination: 20   - Tdap vaccination: 20   - MMR Postpartum    - Continue PNV, SCDs daily   - NIPT pending   - COVID neg 20    Inpatient management of Vasa Previa    - VSS, Afebrile   - cEFM/TOCO: Cat 1   - CBC and T&S q3d next on 20   - General diet   - Heparin 5000 units BID for DVT prophylaxis   - Midline in place 20   - C/S consent 20   - NICU consult completed 12/3/20   - MFM scan: MFM  Succenturiate lobe, marginal cord and vasa previa all visualized again today. KELECHI 13.51 cm    - MFM scans on     - S/p Celestone , 12/3/20.  Will plan for rescue course 20   - C/S scheduled for 20    gHTN (new dx)     - BP currently normotensive   - Denies s/s of PreE   - Continue ASA   - PreE labs wnl x1, P/C 0.10 ()     Hx Pulmonary Stenosis s/p Valvuloplasty    - Patient has a history of congential pulmonary stenosis s/p balloon valvuloplasty at age 1   - ECHO 20: Mild to moderate pulmonary insufficiency stable from 2016    - Cardiology recommended follow up in 3 years   - Fetal ECHO wnl    Marginal Cord Insertion    - Continue following with MFM      Anterior Accessory Lobe    - Continue following with MFM      Lagging AC (<3rd percentile)    - NIPT pending       Anemia (Hgb 11.0>10.4>11.3)    - CBC on    - Patient remains asymptomatic       BMI 44.80     Patient Active Problem List    Diagnosis Date Noted    Celestone 20, 2020     Priority: High    Placenta marginalis in third trimester     Placenta succenturiata in third trimester     Poor fetal growth

## 2020-12-10 NOTE — FLOWSHEET NOTE
EFM removed for patient to shower. Pt instructed to call out when she is ready to resume monitoring.

## 2020-12-11 LAB
ABO/RH: NORMAL
ABSOLUTE EOS #: 0.1 K/UL (ref 0–0.44)
ABSOLUTE IMMATURE GRANULOCYTE: 0.25 K/UL (ref 0–0.3)
ABSOLUTE LYMPH #: 3.09 K/UL (ref 1.1–3.7)
ABSOLUTE MONO #: 0.76 K/UL (ref 0.1–1.2)
ANTIBODY IDENTIFICATION: NORMAL
ANTIBODY SCREEN: POSITIVE
ARM BAND NUMBER: NORMAL
BASOPHILS # BLD: 0 % (ref 0–2)
BASOPHILS ABSOLUTE: 0.03 K/UL (ref 0–0.2)
DIFFERENTIAL TYPE: ABNORMAL
EOSINOPHILS RELATIVE PERCENT: 1 % (ref 1–4)
EXPIRATION DATE: NORMAL
HCT VFR BLD CALC: 33.9 % (ref 36.3–47.1)
HEMOGLOBIN: 10.9 G/DL (ref 11.9–15.1)
IMMATURE GRANULOCYTES: 2 %
LYMPHOCYTES # BLD: 27 % (ref 24–43)
MCH RBC QN AUTO: 29.2 PG (ref 25.2–33.5)
MCHC RBC AUTO-ENTMCNC: 32.2 G/DL (ref 28.4–34.8)
MCV RBC AUTO: 90.9 FL (ref 82.6–102.9)
MONOCYTES # BLD: 7 % (ref 3–12)
NRBC AUTOMATED: 0 PER 100 WBC
PDW BLD-RTO: 12.7 % (ref 11.8–14.4)
PLATELET # BLD: 206 K/UL (ref 138–453)
PLATELET ESTIMATE: ABNORMAL
PMV BLD AUTO: 9.8 FL (ref 8.1–13.5)
RBC # BLD: 3.73 M/UL (ref 3.95–5.11)
RBC # BLD: ABNORMAL 10*6/UL
SEG NEUTROPHILS: 63 % (ref 36–65)
SEGMENTED NEUTROPHILS ABSOLUTE COUNT: 7.31 K/UL (ref 1.5–8.1)
WBC # BLD: 11.5 K/UL (ref 3.5–11.3)
WBC # BLD: ABNORMAL 10*3/UL

## 2020-12-11 PROCEDURE — 86870 RBC ANTIBODY IDENTIFICATION: CPT

## 2020-12-11 PROCEDURE — 2580000003 HC RX 258: Performed by: STUDENT IN AN ORGANIZED HEALTH CARE EDUCATION/TRAINING PROGRAM

## 2020-12-11 PROCEDURE — 99232 SBSQ HOSP IP/OBS MODERATE 35: CPT | Performed by: OBSTETRICS & GYNECOLOGY

## 2020-12-11 PROCEDURE — 36415 COLL VENOUS BLD VENIPUNCTURE: CPT

## 2020-12-11 PROCEDURE — 6360000002 HC RX W HCPCS: Performed by: STUDENT IN AN ORGANIZED HEALTH CARE EDUCATION/TRAINING PROGRAM

## 2020-12-11 PROCEDURE — 1220000000 HC SEMI PRIVATE OB R&B

## 2020-12-11 PROCEDURE — 6370000000 HC RX 637 (ALT 250 FOR IP): Performed by: STUDENT IN AN ORGANIZED HEALTH CARE EDUCATION/TRAINING PROGRAM

## 2020-12-11 PROCEDURE — 96372 THER/PROPH/DIAG INJ SC/IM: CPT

## 2020-12-11 RX ADMIN — SODIUM CHLORIDE, PRESERVATIVE FREE 10 ML: 5 INJECTION INTRAVENOUS at 21:00

## 2020-12-11 RX ADMIN — HEPARIN SODIUM 5000 UNITS: 5000 INJECTION INTRAVENOUS; SUBCUTANEOUS at 20:59

## 2020-12-11 RX ADMIN — ASPIRIN 81 MG: 81 TABLET ORAL at 09:11

## 2020-12-11 RX ADMIN — HEPARIN SODIUM 5000 UNITS: 5000 INJECTION INTRAVENOUS; SUBCUTANEOUS at 09:11

## 2020-12-11 RX ADMIN — SODIUM CHLORIDE, PRESERVATIVE FREE 10 ML: 5 INJECTION INTRAVENOUS at 09:13

## 2020-12-11 NOTE — PROGRESS NOTES
OB/GYN PROGRESS NOTE    Judith Arredondo is a 30 y.o. female  at [facility] 13 Day: 11    Subjective:   Patient has been seen and examined. Patient is resting comofrtably, complaining of nothing at this time. Patient denies any vaginal discharge and any urinary complaints. The patient reports fetal movement is present, denies contractions, denies loss of fluid, denies vaginal bleeding. Patient denies headache, vision changes, nausea, vomiting, fever, chills, shortness of breath, chest pain, RUQ pain, abdominal pain, diarrhea, change in color/amount/odor of vaginal discharge, dysuria or, hematuria.      Objective:   Vitals:  Vitals:    20 2105 12/10/20 0842 12/10/20 2107 20 0748   BP: 121/67 (!) 126/58 138/74 127/67   Pulse: 87 92 92 91   Resp: 18  20   Temp:  97.9 °F (36.6 °C) 98.4 °F (36.9 °C) 98.2 °F (36.8 °C)   TempSrc:  Oral Oral Oral   Weight:       Height:            FHT: 135, moderate variability, accelerations present, decelerations absent  Contractions: none    Physical Exam:  General appearance:  no apparent distress, alert and cooperative  HEENT: head atraumatic, normocephalic, moist mucous membranes, trachea midline  Neurologic:  alert, oriented, normal speech, no focal findings or movement disorder noted  Lungs:  No increased work of breathing, good air exchange, clear to auscultation bilaterally, no crackles or wheezing  Heart:  regular rate and rhythm    Abdomen:  soft, gravid and non-tender  Extremities:  no calf tenderness  Musculoskeletal: Gross strength equal and intact throughout, no gross abnormalities, range of motion normal in hips, knees, shoulders and spine, CVA tenderness: none  Psychiatric: Mood appropriate, normal affect   Rectal Exam: not indicated  Pelvic Exam:    Diagnostics:     ECHO 20: Mild to moderate pulmonary insufficiency stable from 2016     [facility] : Posterior placenta w/ anterior acessory lobe, Vasa previa, Marginal cord,   BPP 8/8, KELECHI 13.59, EFW 2#10 ()     MFM : Succenturiate lobe, marginal cord and vasa previa all visualized again today. KELECHI 13.51 cm ECHO 20: Mild to moderate pulmonary insufficiency stable from 2016     Assessment/Plan:  Anup Arredondo is a 30 y.o. female  at [address] [place] [address]   - Rh neg/ Rubella NI/ GBS neg   - No indication  for GBS prophylaxis    - Rhogam: 20; Rhogam hdz PP   - Influenza vaccination: 20   - Tdap vaccination: 20   - MMR Postpartum    - Continue PNV, SCDs daily   - NIPT neg   - COVID neg 20    Inpatient management of Vasa Previa    - VSS, Afebrile   - cEFM/TOCO: Cat 1   - CBC and T&S q3d next on 20   - General diet   - Heparin 5000 units BID for DVT prophylaxis   - Midline in place 20   - C/S consent 20   - NICU consult completed 12/3/20   - MFM scan: MFM  Succenturiate lobe, marginal cord and vasa previa all visualized again today. KELECHI 13.51 cm    - MFM scans on     - S/p Celestone , 12/3/20.  Will plan for rescue course 20   - C/S scheduled for 20    gHTN (new dx)     - BP currently normotensive   - Denies s/s of PreE   - Continue ASA   - PreE labs wnl x1, P/C 0.10 ()     Hx Pulmonary Stenosis s/p Valvuloplasty    - Patient has a history of congential pulmonary stenosis s/p balloon valvuloplasty at age 1   - ECHO 20: Mild to moderate pulmonary insufficiency stable from 2016    - Cardiology recommended follow up in 3 years   - Fetal ECHO wnl    Marginal Cord Insertion    - Continue following with MFM      Anterior Accessory Lobe    - Continue following with MFM      Lagging AC (<3rd percentile)    - NIPT neg      Anemia (Hgb 11.0>10.4>11.3>10.9)    - CBC on    - Patient remains asymptomatic       BMI 44.80     Patient Active Problem List    Diagnosis Date Noted    Celestone 20, 2020     Priority: High    Placenta marginalis in third trimester     Placenta succenturiata in third trimester     Poor fetal growth complicating pregnancy, antepartum     Gestational hypertension (G1) 12/05/2020    31 weeks gestation of pregnancy 12/01/2020    Vasa previa 11/29/2020     Overview Note:     Plan for inpatient management at 32 weeks.  Marginal cord insertion 11/29/2020    Lagging AC  11/29/2020    BMI 44.80 11/29/2020    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 Pt desires care with different 2018 [facility] records/ultrasound/labs  from previous provider from [address] [place] [address].  scanned into media. Pt moved from        Pulmonary Stenosis s/p Valvuloplasty  07/14/2020     Overview Note:     Dx at 2 and underwent a balloon valvuloplasty at age 1 years, performed by Ora Mendes at [facility]  In [facility].    Most recent visit to peds cardiology scanned into [email or web address]         Will update [facility],   Ob/Gyn Resident  12/11/2020, 9:41 AM

## 2020-12-12 PROCEDURE — 2580000003 HC RX 258: Performed by: STUDENT IN AN ORGANIZED HEALTH CARE EDUCATION/TRAINING PROGRAM

## 2020-12-12 PROCEDURE — 96372 THER/PROPH/DIAG INJ SC/IM: CPT

## 2020-12-12 PROCEDURE — 6360000002 HC RX W HCPCS: Performed by: STUDENT IN AN ORGANIZED HEALTH CARE EDUCATION/TRAINING PROGRAM

## 2020-12-12 PROCEDURE — 1220000000 HC SEMI PRIVATE OB R&B

## 2020-12-12 PROCEDURE — 6370000000 HC RX 637 (ALT 250 FOR IP): Performed by: STUDENT IN AN ORGANIZED HEALTH CARE EDUCATION/TRAINING PROGRAM

## 2020-12-12 RX ADMIN — HEPARIN SODIUM 5000 UNITS: 5000 INJECTION INTRAVENOUS; SUBCUTANEOUS at 22:27

## 2020-12-12 RX ADMIN — HEPARIN SODIUM 5000 UNITS: 5000 INJECTION INTRAVENOUS; SUBCUTANEOUS at 08:16

## 2020-12-12 RX ADMIN — SODIUM CHLORIDE, PRESERVATIVE FREE 10 ML: 5 INJECTION INTRAVENOUS at 22:28

## 2020-12-12 RX ADMIN — SODIUM CHLORIDE, PRESERVATIVE FREE 10 ML: 5 INJECTION INTRAVENOUS at 08:15

## 2020-12-12 RX ADMIN — ASPIRIN 81 MG: 81 TABLET ORAL at 08:16

## 2020-12-12 NOTE — PROGRESS NOTES
OB/GYN PROGRESS NOTE    Maury Arredondo is a 30 y.o. female  at [facility] Day: 12    Subjective:   Patient has been seen and examined. Patient is resting comfortably, complaining of nothing . Patient denies any vaginal discharge and any urinary complaints. The patient reports fetal movement is present, denies contractions, denies loss of fluid, denies vaginal bleeding. Patient denies headache, vision changes, nausea, vomiting, fever, chills, shortness of breath, chest pain, RUQ pain, abdominal pain, diarrhea, change in color/amount/odor of vaginal discharge, dysuria or, hematuria.      Objective:   Vitals:  Vitals:    20 1604 20 2105 20 2106 20 0816   BP: 101/61  116/63 127/73   Pulse: 90  97 89   Resp: 16 16  18   Temp: 98.2 °F (36.8 °C) 98.5 °F (36.9 °C)  98.1 °F (36.7 °C)   TempSrc: Oral Oral  Oral   Weight:       Height:         FHT: 140, moderate variability, accelerations present, decelerations absent  Contractions: none    Physical Exam:  General appearance:  no apparent distress, alert and cooperative  HEENT: head atraumatic, normocephalic, moist mucous membranes, trachea midline  Neurologic:  alert, oriented, normal speech, no focal findings or movement disorder noted  Lungs:  No increased work of breathing, good air exchange, clear to auscultation bilaterally, no crackles or wheezing  Heart:  regular rate and rhythm and no murmur    Abdomen:  soft, gravid, non-tender, no rebound, guarding, or rigidity, no RUQ or epigastric tenderness, no signs or symptoms of abruption and no signs or symptoms of chorioamnionitis  Extremities:  no calf tenderness, non edematous, DTR's: +2/4 bilateral extremities   Musculoskeletal: Gross strength equal and intact throughout, no gross abnormalities, range of motion normal in hips, knees, shoulders and spine, CVA tenderness: none  Psychiatric: Mood appropriate, normal affect   Rectal Exam: not indicated  Pelvic Exam: not indication DATA:  Labs: collected and reviewed q3 days. Next lab draw     Diagnostics:     ECHO 20: Mild to moderate pulmonary insufficiency stable from 2016  MFM : Posterior placenta w/ anterior acessory lobe, Vasa previa, Marginal cord, BPP , KELECHI 13.59, EFW 2#10 ()  MFM  Succenturiate lobe, marginal cord and vasa previa all visualized again today. KELECHI 13.51 cm      Assessment/Plan:  Nani Carrier is a 30 y.o. female  at [address] [place] [address]   - Rh neg/ Rubella non-immune/ GBS neg   - No indication for GBS prophylaxis at this time    - Rhogam: Received 20, rhogam hdz PP   - MMR PP   - Influenza vaccination: 20    - Tdap vaccination: 20   - Continue PNV daily, encouraged SCDs   - VSS. Afebrile   - Cat 1 FHT, TOCO none   - NIPT neg   - COVID neg 20   - Continue to monitor. No OB complaints. + fetal movement    Inpatient management of Vasa Previa   - VSS, Afebrile   - cEFM/TOCO: Cat 1 with no contractions   - Denies cramping, bleeding, or leaking   - General diet   - Heparin 5000 units BID for DVT prophylaxis   - Midline in place 20   - C/S consent 20   - NICU consult completed 12/3/20   - Continue to follow with MFM, scan on    - Last scan : succenturiate lobe, marginal cord, and vasa previa all visualized again. KELECHI 13.51cm   - S/p Celestone , 12/3. Rescue course 20   - C/S scheduled 20   - Patient continues to be stable clinically. Continue to monitor closely     gHTN (newly diagnosed)   - BP currently normotensive    - Denies s/s of preE   - Continue ASA   - PreE labs wnl, P/C 0.10    Hx pulmonary stenosis s/p valvuloplasty   - Asymptomatic.  VSS    - Patient has a history of congenital pulmonary stenosis s/p balloon valvuloplasty at age 1   - ECHO 20: mild to moderate pulmonary insufficiency stable from 2016   - Cardiology recommended follow up in 3 years   - Fetal echo wnl    Marginal Cord Insertion   - Continue following with M Anterior Accessory Lobe   - Continue to follow with MFM    Lagging AC (<3rd percentile)   - NIPT negative    Anemia   - Asymptomatic, VSS   - Continue to trend hgb (11.10.4>11.3>10. 9)    BMI 44    Patient Active Problem List    Diagnosis Date Noted    Celestone 12/1/20, 12/2 12/01/2020     Priority: High    Placenta marginalis in third trimester     Placenta succenturiata in third trimester     Poor fetal growth complicating pregnancy, antepartum     Gestational hypertension (G1) 12/05/2020    31 weeks gestation of pregnancy 12/01/2020    Vasa previa 11/29/2020     Overview Note:     Plan for inpatient management at 32 weeks.  Marginal cord insertion 11/29/2020    Lagging AC  11/29/2020    BMI 44.80 11/29/2020    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 Pt desires care with different [address] [place] [address] records/ultrasound/labs  from previous provider from [address] [place] [address].  scanned into media. Pt moved from       Hx Pulmonary Stenosis s/p Valvuloplasty  07/14/2020     Overview Note:     Dx at 2 and underwent a balloon valvuloplasty at age 1 years, performed by Toño Mack at [facility]  In [address] [place] [address].    Most recent visit to peds cardiology scanned into [employer] [facility],   Ob/Gyn Resident  12/12/2020, 10:43 AM

## 2020-12-12 NOTE — PROGRESS NOTES
Pt seen and examined. Stable without complaint. Agree with resident progress note. All questions answered. Denies LOF. Denies VB. Denies CTX. +     @ 32w4d   Kristina Sepulveda c/s scheduled   Continue current close observation and MFM weekly.      [facility] DO

## 2020-12-13 PROCEDURE — 2580000003 HC RX 258: Performed by: STUDENT IN AN ORGANIZED HEALTH CARE EDUCATION/TRAINING PROGRAM

## 2020-12-13 PROCEDURE — 96372 THER/PROPH/DIAG INJ SC/IM: CPT

## 2020-12-13 PROCEDURE — 6370000000 HC RX 637 (ALT 250 FOR IP): Performed by: STUDENT IN AN ORGANIZED HEALTH CARE EDUCATION/TRAINING PROGRAM

## 2020-12-13 PROCEDURE — 6360000002 HC RX W HCPCS: Performed by: STUDENT IN AN ORGANIZED HEALTH CARE EDUCATION/TRAINING PROGRAM

## 2020-12-13 PROCEDURE — 99232 SBSQ HOSP IP/OBS MODERATE 35: CPT | Performed by: OBSTETRICS & GYNECOLOGY

## 2020-12-13 PROCEDURE — 1220000000 HC SEMI PRIVATE OB R&B

## 2020-12-13 RX ORDER — DOCUSATE SODIUM 100 MG/1
100 CAPSULE, LIQUID FILLED ORAL DAILY
Status: DISCONTINUED | OUTPATIENT
Start: 2020-12-13 | End: 2020-12-20

## 2020-12-13 RX ADMIN — HEPARIN SODIUM 5000 UNITS: 5000 INJECTION INTRAVENOUS; SUBCUTANEOUS at 09:14

## 2020-12-13 RX ADMIN — ASPIRIN 81 MG: 81 TABLET ORAL at 09:14

## 2020-12-13 RX ADMIN — HEPARIN SODIUM 5000 UNITS: 5000 INJECTION INTRAVENOUS; SUBCUTANEOUS at 21:06

## 2020-12-13 RX ADMIN — SODIUM CHLORIDE, PRESERVATIVE FREE 10 ML: 5 INJECTION INTRAVENOUS at 21:05

## 2020-12-13 RX ADMIN — SODIUM CHLORIDE, PRESERVATIVE FREE 10 ML: 5 INJECTION INTRAVENOUS at 09:15

## 2020-12-13 NOTE — PROGRESS NOTES
Pelvic Exam: not indicated    DATA:  Labs:   Lab Results   Component Value Date    WBC 11.5 (H) 2020    HGB 10.9 (L) 2020    HCT 33.9 (L) 2020    MCV 90.9 2020     2020                         Diagnostics:         Assessment/Plan:  Anup Arredondo is a 30 y.o. female  at 33w0d IUP with Vasa Previa   - Rh negative/ Rubella non-immune/ GBS negative   - Will need MMR postpartum   - No indication for GBS prophylaxis    - Rhogam: given 20   - Rhogam workup needed postpartun   - Influenza vaccination: given 20   - Tdap vaccination: given 20   - CBC, T&S q3 days (next on 20)   - CEFM/TOCO    - MFM scan on , next due 20   - R midline in place (placed 20)   - Continue PNV, SCDs, ASA daily   - DVT prophylaxis: SCDs and Heparin 5000U BID (started on ), s/p lovenox 40 mg qd   -  section scheduled on 20 at 35w1d GA   - VSS   - Cat I FHT, TOCO none   - Patient doing well without complaints   - CS consent completed and in the chart   - NICU consult completed 12/3/20    S/p celestone , 20   - Can consider rescue steroids before 34 wk GA on 20    gHTN (new dx)   - Pt denies any s/s PreE   - PreE labs WNL x1, P/C 0.10 ()   - BPs normotensive    Hx pulmonary stenosis s/p valvuloplasty   - Last Echo and visit with Peds Cardiology 20 (noted in media): mild to moderate pulmonary insufficiency stable from 2016   - Pt denies any CP, SOB    Marginal cord insertion   - Noted on last MFM ultrasound    Anterior placental accessory lobe   - Noted on last MFM ultrasound    Fetal Lagging AC (<3rd%)   - NIPT wnl   - MFM scan due Monday, 20    Anemia (Hgb 11.0>10.4>11.3>10.9)   - Pt denies any s/s anemia   - VSS    BMI 44.80      Patient Active Problem List    Diagnosis Date Noted    Celestone 20, 2020     Priority: High    Placenta marginalis in third trimester  Placenta succenturiata in third trimester     Poor fetal growth complicating pregnancy, antepartum     Gestational hypertension (G1) 12/05/2020    31 weeks gestation of pregnancy 12/01/2020    Vasa previa 11/29/2020     Overview Note:     Plan for inpatient management at 32 weeks.  Marginal cord insertion 11/29/2020    Lagging AC  11/29/2020    BMI 44.80 11/29/2020    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 Pt desires care with different 2018 [facility] records/ultrasound/labs  from previous provider from [address] [place] [address].  scanned into media. Pt moved from       Hx Pulmonary Stenosis s/p Valvuloplasty  07/14/2020     Overview Note:     Dx at 2 and underwent a balloon valvuloplasty at age 1 years, performed by Angel Quesada at [facility]  In [address] [place] [address].    Most recent visit to peds cardiology scanned into [employer]         Will update Dr. Megha Hoffman DO  Ob/Gyn Resident  12/13/2020, 8:40 AM

## 2020-12-13 NOTE — PROGRESS NOTES
Mono # 12/01/2020 0.58  0.10 - 1.20 k/uL Final    Absolute Eos # 12/01/2020 0.04  0.00 - 0.44 k/uL Final    Basophils Absolute 12/01/2020 0.04  0.00 - 0.20 k/uL Final    Absolute Immature Granulocyte 12/01/2020 0.12  0.00 - 0.30 k/uL Final    WBC Morphology 12/01/2020 NOT REPORTED   Final    RBC Morphology 12/01/2020 NOT REPORTED   Final    Platelet Estimate 94/25/6312 NOT REPORTED   Final    Expiration Date 12/01/2020 12/04/2020,2359   Final    Arm Band Number 12/01/2020 [id number]   Final    ABO/Rh 12/01/2020 A NEGATIVE   Final    Antibody Screen 12/01/2020 POSITIVE   Final    Antibody ID 12/01/2020 Anti-D, Passive Due To RhIG   Final    T. pallidum, IgG 12/01/2020 NONREACTIVE  NONREACTIVE Final    Comment:       T. pallidum antibodies are not detected. There is no serological evidence of infection with T. pallidum (early primary syphilis   cannot be excluded). Retest in 2-4 weeks if syphilis is clinically suspect.  Specimen Description 12/01/2020 . VAGINA   Final    Special Requests 12/01/2020 NOT REPORTED   Final    Culture 12/01/2020 NEGATIVE FOR GROUP B STREPTOCOCCI   Final    Amphetamine Screen, Ur 12/01/2020 NEGATIVE  NEGATIVE Final    Comment:       (Positive cutoff 1000 ng/mL)                  Barbiturate Screen, Ur 12/01/2020 NEGATIVE  NEGATIVE Final    Comment:       (Positive cutoff 200 ng/mL)                  Benzodiazepine Screen, Urine 12/01/2020 NEGATIVE  NEGATIVE Final    Comment:       (Positive cutoff 200 ng/mL)                  Cocaine Metabolite, Urine 12/01/2020 NEGATIVE  NEGATIVE Final    Comment:       (Positive cutoff 300 ng/mL)                  Methadone Screen, Urine 12/01/2020 NEGATIVE  NEGATIVE Final    Comment:       (Positive cutoff 300 ng/mL)                  Opiates, Urine 12/01/2020 NEGATIVE  NEGATIVE Final    Comment:       (Positive cutoff 300 ng/mL)                  Phencyclidine, Urine 12/01/2020 NEGATIVE  NEGATIVE Final    Comment: (Positive cutoff 25 ng/mL)                  Propoxyphene, Urine 12/01/2020 NOT REPORTED  NEGATIVE Final    Cannabinoid Scrn, Ur 12/01/2020 NEGATIVE  NEGATIVE Final    Comment:       (Positive cutoff 50 ng/mL)                  Oxycodone Screen, Ur 12/01/2020 NEGATIVE  NEGATIVE Final    Comment:       (Positive cutoff 100 ng/mL)                  Methamphetamine, Urine 12/01/2020 NOT REPORTED  NEGATIVE Final    Tricyclic Antidepressants, Urine 12/01/2020 NOT REPORTED  NEGATIVE Final    MDMA, Urine 12/01/2020 NOT REPORTED  NEGATIVE Final    Buprenorphine Urine 12/01/2020 NOT REPORTED  NEGATIVE Final    Test Information 12/01/2020 Assay provides medical screening only. The absence of expected drug(s) and/or metabolite(s) may indicate diluted or adulterated urine, limitations of testing or timing of collection. Final    Comment: Testing for legal purposes should be confirmed by another method. To request confirmation   of test result, please call the lab within 7 days of sample submission.  SARS-CoV-2 12/01/2020        Final    SARS-CoV-2, Rapid 12/01/2020 Not Detected  Not Detected Final    Comment:       Rapid NAAT:  The specimen is NEGATIVE for SARS-CoV-2, the novel coronavirus associated with   COVID-19. The ID NOW COVID-19 assay is designed to detect the virus that causes COVID-19 in patients   with signs and symptoms of infection who are suspected of COVID-19. An individual without symptoms of COVID-19 and who is not shedding SARS-CoV-2 virus would   expect to have a negative (not detected) result in this assay. Negative results should be treated as presumptive and, if inconsistent with clinical signs   and symptoms or necessary for patient management,  should be tested with an alternative molecular assay. Negative results do not preclude   SARS-CoV-2 infection and   should not be used as the sole basis for patient management decisions.          Fact sheet for Healthcare Providers: 1.50 - 8.10 k/uL Final    Absolute Lymph # 12/04/2020 2.66  1.10 - 3.70 k/uL Final    Absolute Mono # 12/04/2020 0.95  0.10 - 1.20 k/uL Final    Absolute Eos # 12/04/2020 0.04  0.00 - 0.44 k/uL Final    Basophils Absolute 12/04/2020 0.04  0.00 - 0.20 k/uL Final    Absolute Immature Granulocyte 12/04/2020 0.39* 0.00 - 0.30 k/uL Final    WBC Morphology 12/04/2020 NOT REPORTED   Final    RBC Morphology 12/04/2020 NOT REPORTED   Final    Platelet Estimate 61/85/5379 NOT REPORTED   Final    Expiration Date 12/04/2020 12/06/2020,2359   Final    Arm Band Number 12/04/2020 BE [id number]   Final    ABO/Rh 12/04/2020 A NEGATIVE   Final    Antibody Screen 12/04/2020 POSITIVE   Final    Antibody ID 12/04/2020 Anti-D, Passive Due To RhIG   Final    Total Protein, Urine 12/05/2020 4  mg/dL Final    No normal range established.     Creatinine, Ur 12/05/2020 39.3  28.0 - 217.0 mg/dL Final    Urine Total Protein Creatinine Rat* 12/05/2020 0.10  0.00 - 0.20 Final    Glucose 12/04/2020 94  70 - 99 mg/dL Final    BUN 12/04/2020 10  6 - 20 mg/dL Final    CREATININE 12/04/2020 0.40* 0.50 - 0.90 mg/dL Final    Bun/Cre Ratio 12/04/2020 NOT REPORTED  9 - 20 Final    Calcium 12/04/2020 8.6  8.6 - 10.4 mg/dL Final    Sodium 12/04/2020 138  135 - 144 mmol/L Final    Potassium 12/04/2020 4.2  3.7 - 5.3 mmol/L Final    Chloride 12/04/2020 107  98 - 107 mmol/L Final    CO2 12/04/2020 20  20 - 31 mmol/L Final    Anion Gap 12/04/2020 11  9 - 17 mmol/L Final    Alkaline Phosphatase 12/04/2020 77  35 - 104 U/L Final    ALT 12/04/2020 26  5 - 33 U/L Final    AST 12/04/2020 25  <32 U/L Final    Total Bilirubin 12/04/2020 0.36  0.3 - 1.2 mg/dL Final    Total Protein 12/04/2020 6.0* 6.4 - 8.3 g/dL Final    Alb 12/04/2020 3.2* 3.5 - 5.2 g/dL Final    Albumin/Globulin Ratio 12/04/2020 1.1  1.0 - 2.5 Final    GFR Non- 12/04/2020 >60  >60 mL/min Final    GFR  12/04/2020 >60  >60 mL/min Final    GFR Comment 12/04/2020        Final    Comment: Average GFR for 20-28 years old:   116 mL/min/1.73sq m  Chronic Kidney Disease:   <60 mL/min/1.73sq m  Kidney failure:   <15 mL/min/1.73sq m              eGFR calculated using average adult body mass.  Additional eGFR calculator available at:        [employer]            GFR Staging 12/04/2020 NOT REPORTED   Final    WBC 12/07/2020 12.2* 3.5 - 11.3 k/uL Final    RBC 12/07/2020 3.87* 3.95 - 5.11 m/uL Final    Hemoglobin 12/07/2020 11.3* 11.9 - 15.1 g/dL Final    Hematocrit 12/07/2020 35.2* 36.3 - 47.1 % Final    MCV 12/07/2020 91.0  82.6 - 102.9 fL Final    MCH 12/07/2020 29.2  25.2 - 33.5 pg Final    MCHC 12/07/2020 32.1  28.4 - 34.8 g/dL Final    RDW 12/07/2020 12.6  11.8 - 14.4 % Final    Platelets 68/58/7753 235  138 - 453 k/uL Final    MPV 12/07/2020 9.8  8.1 - 13.5 fL Final    NRBC Automated 12/07/2020 0.0  0.0 per 100 WBC Final    Differential Type 12/07/2020 NOT REPORTED   Final    Seg Neutrophils 12/07/2020 68* 36 - 65 % Final    Lymphocytes 12/07/2020 23* 24 - 43 % Final    Monocytes 12/07/2020 6  3 - 12 % Final    Eosinophils % 12/07/2020 1  1 - 4 % Final    Basophils 12/07/2020 0  0 - 2 % Final    Immature Granulocytes 12/07/2020 2* 0 % Final    Segs Absolute 12/07/2020 8.25* 1.50 - 8.10 k/uL Final    Absolute Lymph # 12/07/2020 2.83  1.10 - 3.70 k/uL Final    Absolute Mono # 12/07/2020 0.69  0.10 - 1.20 k/uL Final    Absolute Eos # 12/07/2020 0.11  0.00 - 0.44 k/uL Final    Basophils Absolute 12/07/2020 0.03  0.00 - 0.20 k/uL Final    Absolute Immature Granulocyte 12/07/2020 0.24  0.00 - 0.30 k/uL Final    WBC Morphology 12/07/2020 NOT REPORTED   Final    RBC Morphology 12/07/2020 NOT REPORTED   Final    Platelet Estimate 02/05/4447 NOT REPORTED   Final    Expiration Date 12/07/2020 12/10/2020,2359   Final    Arm Band Number 12/07/2020 BE [id number]   Final    ABO/Rh 12/07/2020 A NEGATIVE Final    Antibody Screen 12/07/2020 NOT TESTED   Final    Antibody ID 12/07/2020 Anti-D, Passive Due To RhIG   Final    Blood Bank Specimen 12/07/2020 NOT REPORTED   Final    WBC 12/11/2020 11.5* 3.5 - 11.3 k/uL Final    RBC 12/11/2020 3.73* 3.95 - 5.11 m/uL Final    Hemoglobin 12/11/2020 10.9* 11.9 - 15.1 g/dL Final    Hematocrit 12/11/2020 33.9* 36.3 - 47.1 % Final    MCV 12/11/2020 90.9  82.6 - 102.9 fL Final    MCH 12/11/2020 29.2  25.2 - 33.5 pg Final    MCHC 12/11/2020 32.2  28.4 - 34.8 g/dL Final    RDW 12/11/2020 12.7  11.8 - 14.4 % Final    Platelets 96/73/7768 206  138 - 453 k/uL Final    MPV 12/11/2020 9.8  8.1 - 13.5 fL Final    NRBC Automated 12/11/2020 0.0  0.0 per 100 WBC Final    Differential Type 12/11/2020 NOT REPORTED   Final    Seg Neutrophils 12/11/2020 63  36 - 65 % Final    Lymphocytes 12/11/2020 27  24 - 43 % Final    Monocytes 12/11/2020 7  3 - 12 % Final    Eosinophils % 12/11/2020 1  1 - 4 % Final    Basophils 12/11/2020 0  0 - 2 % Final    Immature Granulocytes 12/11/2020 2* 0 % Final    Segs Absolute 12/11/2020 7.31  1.50 - 8.10 k/uL Final    Absolute Lymph # 12/11/2020 3.09  1.10 - 3.70 k/uL Final    Absolute Mono # 12/11/2020 0.76  0.10 - 1.20 k/uL Final    Absolute Eos # 12/11/2020 0.10  0.00 - 0.44 k/uL Final    Basophils Absolute 12/11/2020 0.03  0.00 - 0.20 k/uL Final    Absolute Immature Granulocyte 12/11/2020 0.25  0.00 - 0.30 k/uL Final    WBC Morphology 12/11/2020 NOT REPORTED   Final    RBC Morphology 12/11/2020 NOT REPORTED   Final    Platelet Estimate 83/75/5613 NOT REPORTED   Final    Expiration Date 12/11/2020 12/14/2020,2359   Final    Arm Band Number 12/11/2020 BE [id number]   Final    ABO/Rh 12/11/2020 A NEGATIVE   Final    Antibody Screen 12/11/2020 POSITIVE   Final    Antibody ID 12/11/2020 Anti-D, Passive Due To RhIG   Final       Attending's Name:  Electronically signed by Woo Avitia DO on 12/13/2020 at 12:56 PM

## 2020-12-14 LAB
ABSOLUTE EOS #: 0.1 K/UL (ref 0–0.44)
ABSOLUTE IMMATURE GRANULOCYTE: 0.16 K/UL (ref 0–0.3)
ABSOLUTE LYMPH #: 3.06 K/UL (ref 1.1–3.7)
ABSOLUTE MONO #: 0.81 K/UL (ref 0.1–1.2)
BASOPHILS # BLD: 1 % (ref 0–2)
BASOPHILS ABSOLUTE: 0.05 K/UL (ref 0–0.2)
BLOOD BANK SPECIMEN: NORMAL
DIFFERENTIAL TYPE: ABNORMAL
EOSINOPHILS RELATIVE PERCENT: 1 % (ref 1–4)
HCT VFR BLD CALC: 33.2 % (ref 36.3–47.1)
HEMOGLOBIN: 10.6 G/DL (ref 11.9–15.1)
IMMATURE GRANULOCYTES: 2 %
LYMPHOCYTES # BLD: 29 % (ref 24–43)
MCH RBC QN AUTO: 29.4 PG (ref 25.2–33.5)
MCHC RBC AUTO-ENTMCNC: 31.9 G/DL (ref 28.4–34.8)
MCV RBC AUTO: 92 FL (ref 82.6–102.9)
MONOCYTES # BLD: 8 % (ref 3–12)
NRBC AUTOMATED: 0 PER 100 WBC
PDW BLD-RTO: 12.7 % (ref 11.8–14.4)
PLATELET # BLD: 209 K/UL (ref 138–453)
PLATELET ESTIMATE: ABNORMAL
PMV BLD AUTO: 10 FL (ref 8.1–13.5)
RBC # BLD: 3.61 M/UL (ref 3.95–5.11)
RBC # BLD: ABNORMAL 10*6/UL
SEG NEUTROPHILS: 59 % (ref 36–65)
SEGMENTED NEUTROPHILS ABSOLUTE COUNT: 6.35 K/UL (ref 1.5–8.1)
WBC # BLD: 10.5 K/UL (ref 3.5–11.3)
WBC # BLD: ABNORMAL 10*3/UL

## 2020-12-14 PROCEDURE — 86870 RBC ANTIBODY IDENTIFICATION: CPT

## 2020-12-14 PROCEDURE — 6370000000 HC RX 637 (ALT 250 FOR IP): Performed by: STUDENT IN AN ORGANIZED HEALTH CARE EDUCATION/TRAINING PROGRAM

## 2020-12-14 PROCEDURE — 1220000000 HC SEMI PRIVATE OB R&B

## 2020-12-14 PROCEDURE — 2580000003 HC RX 258: Performed by: STUDENT IN AN ORGANIZED HEALTH CARE EDUCATION/TRAINING PROGRAM

## 2020-12-14 PROCEDURE — 76816 OB US FOLLOW-UP PER FETUS: CPT | Performed by: OBSTETRICS & GYNECOLOGY

## 2020-12-14 PROCEDURE — 76821 MIDDLE CEREBRAL ARTERY ECHO: CPT | Performed by: OBSTETRICS & GYNECOLOGY

## 2020-12-14 PROCEDURE — 76817 TRANSVAGINAL US OBSTETRIC: CPT | Performed by: OBSTETRICS & GYNECOLOGY

## 2020-12-14 PROCEDURE — 76820 UMBILICAL ARTERY ECHO: CPT | Performed by: OBSTETRICS & GYNECOLOGY

## 2020-12-14 PROCEDURE — 86900 BLOOD TYPING SEROLOGIC ABO: CPT

## 2020-12-14 PROCEDURE — 86850 RBC ANTIBODY SCREEN: CPT

## 2020-12-14 PROCEDURE — 85025 COMPLETE CBC W/AUTO DIFF WBC: CPT

## 2020-12-14 PROCEDURE — 96372 THER/PROPH/DIAG INJ SC/IM: CPT

## 2020-12-14 PROCEDURE — 76819 FETAL BIOPHYS PROFIL W/O NST: CPT | Performed by: OBSTETRICS & GYNECOLOGY

## 2020-12-14 PROCEDURE — 6360000002 HC RX W HCPCS: Performed by: STUDENT IN AN ORGANIZED HEALTH CARE EDUCATION/TRAINING PROGRAM

## 2020-12-14 PROCEDURE — 86901 BLOOD TYPING SEROLOGIC RH(D): CPT

## 2020-12-14 PROCEDURE — 36415 COLL VENOUS BLD VENIPUNCTURE: CPT

## 2020-12-14 RX ORDER — BETAMETHASONE SODIUM PHOSPHATE AND BETAMETHASONE ACETATE 3; 3 MG/ML; MG/ML
12 INJECTION, SUSPENSION INTRA-ARTICULAR; INTRALESIONAL; INTRAMUSCULAR; SOFT TISSUE DAILY
Status: COMPLETED | OUTPATIENT
Start: 2020-12-19 | End: 2020-12-20

## 2020-12-14 RX ADMIN — ASPIRIN 81 MG: 81 TABLET ORAL at 09:16

## 2020-12-14 RX ADMIN — HEPARIN SODIUM 5000 UNITS: 5000 INJECTION INTRAVENOUS; SUBCUTANEOUS at 21:08

## 2020-12-14 RX ADMIN — HEPARIN SODIUM 5000 UNITS: 5000 INJECTION INTRAVENOUS; SUBCUTANEOUS at 09:16

## 2020-12-14 RX ADMIN — SODIUM CHLORIDE, PRESERVATIVE FREE 10 ML: 5 INJECTION INTRAVENOUS at 09:16

## 2020-12-14 RX ADMIN — SODIUM CHLORIDE, PRESERVATIVE FREE 10 ML: 5 INJECTION INTRAVENOUS at 21:08

## 2020-12-14 NOTE — PROGRESS NOTES
Obstetric/Gynecology Maternal Fetal Medicine Resident Note    Patient seen and scanned at [facility] today. Succenturiate lobe, marginal cord and vasa previa all visualized again today. KELECHI 11.67 cm, EFW 3#10. Continue current care, will plan for rescue course of celestone on 12/19/20 with delivery 12/28/20 or sooner if clinically indicated.     DO TETE Moreno Resident, PGY2  [facility]  12/14/2020, 9:03 AM

## 2020-12-14 NOTE — FLOWSHEET NOTE
Pt calls out to be placed back on the monitor. Abdomen palpates soft. No complaints.  Audible movement noted

## 2020-12-14 NOTE — FLOWSHEET NOTE
Pt up in room. No complaints. Denies any cramping, contractions or leaking. Denies any chest pain or shortness of breath.

## 2020-12-14 NOTE — PROGRESS NOTES
OB/GYN PROGRESS NOTE    Clark Arredondo is a 30 y.o. female  at [facility] 13 Day: 14    Subjective:   Patient has been seen and examined. Patient is resting comofrtably, complaining of nothing at this time. Patient denies any vaginal discharge and any urinary complaints. The patient reports fetal movement is present, denies contractions, denies loss of fluid, denies vaginal bleeding. Patient denies headache, vision changes, nausea, vomiting, fever, chills, shortness of breath, chest pain, RUQ pain, abdominal pain, diarrhea, change in color/amount/odor of vaginal discharge, dysuria or, hematuria.      Objective:   Vitals:  Vitals:    20 2232 20 1405 20 2214 20 2215   BP: 128/65 (!) 140/77  116/62   Pulse: 104 104  85   Resp:  16 16    Temp: 97.7 °F (36.5 °C) 98.4 °F (36.9 °C) 98.3 °F (36.8 °C)    TempSrc: Oral  Oral    Weight:       Height:            FHT: 135, moderate variability, accelerations present, decelerations absent  Contractions: none    Physical Exam:  General appearance:  no apparent distress, alert and cooperative  HEENT: head atraumatic, normocephalic, moist mucous membranes, trachea midline  Neurologic:  alert, oriented, normal speech, no focal findings or movement disorder noted  Lungs:  No increased work of breathing, good air exchange, clear to auscultation bilaterally, no crackles or wheezing  Heart:  regular rate and rhythm    Abdomen:  soft, gravid and non-tender  Extremities:  no calf tenderness  Musculoskeletal: Gross strength equal and intact throughout, no gross abnormalities, range of motion normal in hips, knees, shoulders and spine, CVA tenderness: none  Psychiatric: Mood appropriate, normal affect   Rectal Exam: not indicated  Pelvic Exam:    Diagnostics:     ECHO 20: Mild to moderate pulmonary insufficiency stable from 2016     [facility] : Posterior placenta w/ anterior acessory lobe, Vasa previa, Marginal cord,   BPP 8/8, KELECHI 13.59, EFW 2#10 ()  Placenta marginalis in third trimester     Placenta succenturiata in third trimester     Poor fetal growth complicating pregnancy, antepartum     Gestational hypertension (G1) 12/05/2020    31 weeks gestation of pregnancy 12/01/2020    Vasa previa 11/29/2020     Overview Note:     Plan for inpatient management at 32 weeks.  Marginal cord insertion 11/29/2020    Lagging AC  11/29/2020    BMI 44.80 11/29/2020    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 Pt desires care with different 2018 [facility] records/ultrasound/labs  from previous provider from [address] [place] [address].  scanned into media. Pt moved from       Hx Pulmonary Stenosis s/p Valvuloplasty  07/14/2020     Overview Note:     Dx at 2 and underwent a balloon valvuloplasty at age 1 years, performed by George Forbes at [facility]  In [address] [place] [address]. Most recent visit to peds cardiology scanned into [employer]         Will update Kiarra Baltazar.      Elba Kwon DO  Ob/Gyn Resident  12/14/2020, 12:54 PM

## 2020-12-14 NOTE — PROGRESS NOTES
OB/GYN PROGRESS NOTE    Judith Arredondo is a 30 y.o. female  at 33w1d, Hospital Day: 14    Subjective:   Patient has been seen and examined. Patient is doing well with no complaints. She is excited to see the baby on ultrasound today. The patient reports fetal movement is present, denies contractions, denies loss of fluid, denies vaginal bleeding. Patient denies headache, vision changes, nausea, vomiting, fever, chills, shortness of breath, chest pain, RUQ pain, abdominal pain, diarrhea, change in color/amount/odor of vaginal discharge, dysuria or, hematuria.      Objective:   Vitals:  Vitals:    20 2232 20 1405 20 2214 20 221   BP: 128/65 (!) 140/77  116/62   Pulse: 104 104  85   Resp:  16 16    Temp: 97.7 °F (36.5 °C) 98.4 °F (36.9 °C) 98.3 °F (36.8 °C)    TempSrc: Oral  Oral    Weight:       Height:             FHT: 135, moderate variability, accelerations present, decelerations absent  Contractions: none    Physical Exam:  General appearance:  no apparent distress, alert and cooperative  HEENT: head atraumatic, normocephalic, moist mucous membranes, trachea midline  Neurologic:  alert, oriented, normal speech, no focal findings or movement disorder noted  Lungs:  No increased work of breathing, good air exchange, clear to auscultation bilaterally, no crackles or wheezing  Heart:  regular rate and rhythm and no murmur    Abdomen:  soft, gravid and non-tender  Extremities:  no calf tenderness, non edematous  Musculoskeletal: Gross strength equal and intact throughout, no gross abnormalities, range of motion normal in hips, knees, shoulders and spine,  Psychiatric: Mood appropriate, normal affect   Rectal Exam: not indicated    Assessment/Plan:  Judith Arredondo is a 30 y.o. female  at 33w1d IUP   - Rh negative/ Rubella non-immune/ GBS negative   - No indication for GBS prophylaxis    - Rhogam: given @ 28 cruz   - Influenza vaccination: 20   - Tdap vaccination: 20   - Continue PNV, SCDs daily   - VSS   - Cat 1 FHT, TOCO quiet    Inpatient management of Vasa Previa    - Afebrile, VSS   -cEFM/TOCO   - Cat 1 FHT/TOCO quiet overnight   - CBC and T&S q3 days, due today   - Diet: general   - DVT prophylaxis w/ Heparin and SCDs   - Right midline in place since 20   - C/S consent on chart   - NICU consult complete 12/3/2   - MFM us today   - S/P celestone  and , will plan for rescue course   - Plan for primary  section @ 35 weeks 1 day scheduled w/ Dr. Kamilla Silva     S/P Celestone  and     gHTN (new dx)   - VSS, stable overnight, no severe range   - PreE labs wnl x 1, P/C 0.10 on    - Denies s/s PreE   - Continue ASA 81 mg daily        Hx Pulmonary Stenosis s/p Valvuloplasty (3yo)    - Patient w/ history of congenital pulmonary stenosis s/p balloon valvuloplasty at 1years old   - Echo 30: mild to moderate pulmonary insufficiency, stable from 2016    - Patient evaluated by cardiology and recommended f/u in 3 years      Marginal Cord Insertion    - Follows w/ MFM, scan today      Anterior Placental Accessory Lobe       Lagging AC, BPD, and HC   - MFM scan  BPD HC and AC <3rd  W/ overall EFW 27th percentile    - NIPT pending      Anemia (Hgb 11.0>10.4>11.3>10.9)    - CBC q 3 days   - Clinically asymptomatic       BMI 44.97    Patient Active Problem List    Diagnosis Date Noted    Celestone 20, 2020     Priority: High    33 weeks gestation of pregnancy     Placenta marginalis in third trimester     Placenta succenturiata in third trimester     Poor fetal growth complicating pregnancy, antepartum     Gestational hypertension (G1) 2020    31 weeks gestation of pregnancy 2020    Vasa previa 2020     Overview Note:     Plan for inpatient management at 32 weeks.        Marginal cord insertion 2020    Lagging AC  2020    BMI 44.80 2020    Rh neg/RNI/GBS unk 2020     Overview Note: Intake not completed. Pt desires care with different 2018 [facility] records/ultrasound/labs  from previous provider from [address] [place] [address].  scanned into media. Pt moved from       Hx Pulmonary Stenosis s/p Valvuloplasty  07/14/2020     Overview Note:     Dx at 2 and underwent a balloon valvuloplasty at age 1 years, performed by George Forbes at [facility]  In [address] [place] [address].    Most recent visit to peds cardiology scanned into media-SK         Will update Dr. Cailin Moreau DO  Ob/Gyn Resident  12/14/2020, 7:10 AM

## 2020-12-14 NOTE — PROGRESS NOTES
OB/GYN PROGRESS NOTE     Silvia Arredondo is a 30 y.o. female  at [facility] Day: 14     Subjective:   Patient has been seen and examined. Patient is doing well with no complaints. Patient is encouraged by [facility] ultrasound stability today and said she is feeling excellent fetal movement, no vaginal bleeding, no cramps or contractions.       Vitals:  BP: 137/64  HR: 99  Temp: 98.4         FHT: 135, moderate variability, accelerations present, decelerations absent  Contractions: none     Physical Exam:  General appearance:  no apparent distress, alert and cooperative  HEENT: head atraumatic, normocephalic, moist mucous membranes, trachea midline  Neurologic:  alert, oriented, normal speech, no focal findings or movement disorder noted  Lungs:  No increased work of breathing, good air exchange, clear to auscultation bilaterally, no crackles or wheezing  Heart:  regular rate and rhythm and no murmur    Abdomen:  soft, gravid and non-tender  Extremities:  no calf tenderness, non edematous  Musculoskeletal: Gross strength equal and intact throughout, no gross abnormalities, range of motion normal in hips, knees, shoulders and spine,  Psychiatric: Mood appropriate, normal affect   Rectal Exam: not indicated     Assessment/Plan:  Silvia Arredondo is a 30 y.o. female  at 30w4d IUP             Inpatient management of Vasa Previa                           - [facility] us today - stable, vasa previa noted, KELECHI WNL              - S/P celestone  and , will plan for rescue course              - Plan for primary  section @ 35 weeks 1 day scheduled w/ Dr. Ashli Soni               - Continue ASA 81 mg daily      Hx Pulmonary Stenosis s/p Valvuloplasty (1yo)              - Echo : mild to moderate pulmonary insufficiency, stable from 2016               - Patient evaluated by cardiology and recommended f/u in 3 years

## 2020-12-15 PROCEDURE — 1220000000 HC SEMI PRIVATE OB R&B

## 2020-12-15 PROCEDURE — 6360000002 HC RX W HCPCS: Performed by: STUDENT IN AN ORGANIZED HEALTH CARE EDUCATION/TRAINING PROGRAM

## 2020-12-15 PROCEDURE — 2580000003 HC RX 258: Performed by: STUDENT IN AN ORGANIZED HEALTH CARE EDUCATION/TRAINING PROGRAM

## 2020-12-15 PROCEDURE — 6370000000 HC RX 637 (ALT 250 FOR IP): Performed by: STUDENT IN AN ORGANIZED HEALTH CARE EDUCATION/TRAINING PROGRAM

## 2020-12-15 PROCEDURE — 96372 THER/PROPH/DIAG INJ SC/IM: CPT

## 2020-12-15 RX ADMIN — HEPARIN SODIUM 5000 UNITS: 5000 INJECTION INTRAVENOUS; SUBCUTANEOUS at 08:45

## 2020-12-15 RX ADMIN — ASPIRIN 81 MG: 81 TABLET ORAL at 08:46

## 2020-12-15 RX ADMIN — SODIUM CHLORIDE, PRESERVATIVE FREE 10 ML: 5 INJECTION INTRAVENOUS at 21:05

## 2020-12-15 RX ADMIN — SODIUM CHLORIDE, PRESERVATIVE FREE 10 ML: 5 INJECTION INTRAVENOUS at 08:45

## 2020-12-15 RX ADMIN — HEPARIN SODIUM 5000 UNITS: 5000 INJECTION INTRAVENOUS; SUBCUTANEOUS at 21:05

## 2020-12-15 ASSESSMENT — PAIN SCALES - GENERAL: PAINLEVEL_OUTOF10: 0

## 2020-12-15 NOTE — PROGRESS NOTES
OB/GYN PROGRESS NOTE    Vedia Curling Minor is a 30 y.o. female  at [address] [place] [address] Day: 15    Subjective:   Patient has been seen and examined. Patient is resting in bed comfortably and is in good spirits today. She denies any complaints. She is feeling good fetal movement. Patient denies any urinary complaints. The patient reports fetal movement is present, denies contractions, denies loss of fluid, denies vaginal bleeding. Patient denies headache, vision changes, nausea, vomiting, fever, chills, shortness of breath, chest pain, RUQ pain, abdominal pain, diarrhea, change in color/amount/odor of vaginal discharge, dysuria or, hematuria.      Objective:   Vitals:  Vitals:    20 2215 20 1451 20 2112 12/15/20 0852   BP: 116/62 137/64 (!) 124/57 132/66   Pulse: 85 99 87 96   Resp:  16 16 16   Temp:  98.4 °F (36.9 °C) 98.2 °F (36.8 °C) 97.7 °F (36.5 °C)   TempSrc:   Oral Oral   Weight:       Height:             FHT: 135bpm, moderate variability, accelerations present, decelerations absent  Contractions: none    Physical Exam:  General appearance:  no apparent distress, alert and cooperative  HEENT: head atraumatic, normocephalic, moist mucous membranes, trachea midline  Neurologic:  alert, oriented, normal speech, no focal findings or movement disorder noted  Lungs:  No increased work of breathing, good air exchange, clear to auscultation bilaterally, no crackles or wheezing  Heart:  regular rate and rhythm and no murmur    Abdomen:  soft, gravid, non-tender, no rebound, guarding, or rigidity, no RUQ or epigastric tenderness, no signs or symptoms of abruption and no signs or symptoms of chorioamnionitis  Extremities:  no calf tenderness, non edematous, DTR's: +2/4 bilateral lower extremities   Musculoskeletal: Gross strength equal and intact throughout, no gross abnormalities, range of motion normal in hips, knees, shoulders and spine, CVA tenderness: none  Psychiatric: Mood appropriate, normal affect Rectal Exam: not indicated  Pelvic Exam: not indicated at this time    DATA:  Labs:   Recent Results (from the past 48 hour(s))   TYPE AND SCREEN    Collection Time: 20 10:01 PM   Result Value Ref Range    Expiration Date 2020,2359     Arm Band Number BE [id number]     ABO/Rh A NEGATIVE     Antibody Screen NOT TESTED     Antibody ID Anti-D, Passive Due To RhIG    CBC WITH AUTO DIFFERENTIAL    Collection Time: 20 10:01 PM   Result Value Ref Range    WBC 10.5 3.5 - 11.3 k/uL    RBC 3.61 (L) 3.95 - 5.11 m/uL    Hemoglobin 10.6 (L) 11.9 - 15.1 g/dL    Hematocrit 33.2 (L) 36.3 - 47.1 %    MCV 92.0 82.6 - 102.9 fL    MCH 29.4 25.2 - 33.5 pg    MCHC 31.9 28.4 - 34.8 g/dL    RDW 12.7 11.8 - 14.4 %    Platelets 128 297 - 133 k/uL    MPV 10.0 8.1 - 13.5 fL    NRBC Automated 0.0 0.0 per 100 WBC    Differential Type NOT REPORTED     Seg Neutrophils 59 36 - 65 %    Lymphocytes 29 24 - 43 %    Monocytes 8 3 - 12 %    Eosinophils % 1 1 - 4 %    Basophils 1 0 - 2 %    Immature Granulocytes 2 (H) 0 %    Segs Absolute 6.35 1.50 - 8.10 k/uL    Absolute Lymph # 3.06 1.10 - 3.70 k/uL    Absolute Mono # 0.81 0.10 - 1.20 k/uL    Absolute Eos # 0.10 0.00 - 0.44 k/uL    Basophils Absolute 0.05 0.00 - 0.20 k/uL    Absolute Immature Granulocyte 0.16 0.00 - 0.30 k/uL    WBC Morphology NOT REPORTED     RBC Morphology NOT REPORTED     Platelet Estimate NOT REPORTED    BLOOD BANK SPECIMEN    Collection Time: 20 10:01 PM   Result Value Ref Range    Blood Bank Specimen NOT REPORTED          Diagnostics:             Assessment/Plan:  Anup Arredondo is a 30 y.o. female  at 33w2d IUP   - Rh negative/ Rubella non immune/ GBS negative   - No indication for GBS prophylaxis    - Rhogam: given 20; Rhogam workup PP   - Influenza vaccination: 20   - Tdap vaccination: 20   - MMR postpartum   - Continue PNV daily   - DVT Prophylaxis: SCDs when non ambulatory, Heparin 5000 units BID   - VSS, afebrile   - COVID neg 20   - Cat 1 FHT, TOCO quiet    Inpatient management of Vasa Previa   - VSS, afebrile   - cEFM/TOCO: Cat 1 FHT, TOCO quiet   - CBC and T&S q3d, next on 20   - General Diet   - Midline in place 20   - C/S consent signed and in chart (20)   - NICU consult completed 12/3/20, appreciate recommendations   - MFM US 65/60/57: cephalic presentation, vasa previa present, marginal cord insertion, succenturiate lobe. Lagging A/C. KELECHI 11.67, EFW 3#10.   - Plan for MFM ultrasounds weekly on    - S/p Celestone 20, 12/3/20. Will plan for rescue course on 20   - C/S scheduled for 20    Gestational HTN (newly diagnosed)   - Normotensive overnight   - Denies s/s of preE   - Continue ASA 81mg dialy   - PreE labs wnl x1, P/C 0.1 ()    Hx Pulmonary Stenosis s/p Valvuloplasty   - Patient has a history of congenital pulmonary stenosis s/p balloon valvuloplasty at age 1   - ECHO 20: Mild to moderate pulmonary insufficiency stable from 2016   - Cardiology seen on 20 and recommends follow up in 3 years   - Fetal ECHO wnl    Marginal Cord Insertion   - Continue following with MFM    Anterior Accessory Lobe   - Continue following with MFM    Lagging AC (<3rd percentile)   - NIPT wnl   - Continue following with MFM    Anemia (Hgb 10.9)   - CBC on    - Patient remains asymptomatic     BMI 44.80   - DVT prophylaxis: Heparin and SCDs    Patient Active Problem List    Diagnosis Date Noted    Celestone 20, 2020     Priority: High     screening for fetal growth retardation using ultrasonics     33 weeks gestation of pregnancy     Placenta marginalis in third trimester     Placenta succenturiata in third trimester     Poor fetal growth complicating pregnancy, antepartum     Gestational hypertension (G1) 2020    31 weeks gestation of pregnancy 2020    Vasa previa 2020     Overview Note:     Plan for inpatient management at 32 weeks.  Marginal cord insertion 11/29/2020    Lagging AC  11/29/2020    BMI 44.80 11/29/2020    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 Pt desires care with different 2018 [facility] records/ultrasound/labs  from previous provider from [address] [place] [address].  scanned into media. Pt moved from       Hx Pulmonary Stenosis s/p Valvuloplasty  07/14/2020     Overview Note:     Dx at 2 and underwent a balloon valvuloplasty at age 1 years, performed by Ryan Dowell at [facility]  In [address] [place] [address]. Most recent visit to peds cardiology scanned into [employer]         Will update Dr. Boo Summers.      Agatha Bay DO  Ob/Gyn Resident  12/15/2020, 1:13 PM

## 2020-12-15 NOTE — FLOWSHEET NOTE
Pt sitting upright, eating breakfast. Pt offers no complaints. RN to return for assessment when pt done eating.

## 2020-12-15 NOTE — FLOWSHEET NOTE
Pt sitting up in bed, on computer meeting/call. Pt states +FM. Will resume monitoring when this is complete.  Pt offers no complaints at this time

## 2020-12-16 PROCEDURE — 6360000002 HC RX W HCPCS: Performed by: STUDENT IN AN ORGANIZED HEALTH CARE EDUCATION/TRAINING PROGRAM

## 2020-12-16 PROCEDURE — 2580000003 HC RX 258: Performed by: STUDENT IN AN ORGANIZED HEALTH CARE EDUCATION/TRAINING PROGRAM

## 2020-12-16 PROCEDURE — 96372 THER/PROPH/DIAG INJ SC/IM: CPT

## 2020-12-16 PROCEDURE — 1220000000 HC SEMI PRIVATE OB R&B

## 2020-12-16 PROCEDURE — 6370000000 HC RX 637 (ALT 250 FOR IP): Performed by: STUDENT IN AN ORGANIZED HEALTH CARE EDUCATION/TRAINING PROGRAM

## 2020-12-16 RX ADMIN — ASPIRIN 81 MG: 81 TABLET ORAL at 08:31

## 2020-12-16 RX ADMIN — HEPARIN SODIUM 5000 UNITS: 5000 INJECTION INTRAVENOUS; SUBCUTANEOUS at 21:13

## 2020-12-16 RX ADMIN — HEPARIN SODIUM 5000 UNITS: 5000 INJECTION INTRAVENOUS; SUBCUTANEOUS at 08:31

## 2020-12-16 RX ADMIN — SODIUM CHLORIDE, PRESERVATIVE FREE 10 ML: 5 INJECTION INTRAVENOUS at 08:32

## 2020-12-16 RX ADMIN — SODIUM CHLORIDE, PRESERVATIVE FREE 10 ML: 5 INJECTION INTRAVENOUS at 21:12

## 2020-12-16 NOTE — PROGRESS NOTES
OB/GYN PROGRESS NOTE     Warren Arredondo is a 30 y.o. female  at [facility] Day: 15     Subjective:   Patient with no changes overnight or throughout today.   States she is feeling good movement, no contractions, no vaginal bleeding    Vitals:   BP: 132/66  HR: 96  Resp: 16  Temp: 97.7        FHT: 135bpm, moderate variability, accelerations present, decelerations absent  Contractions: none     Physical Exam:  General appearance:  no apparent distress, alert and cooperative  Abdomen:  soft, gravid, non-tender, no rebound, guarding, or rigidity, no RUQ or epigastric tenderness, no signs or symptoms of abruption and no signs or symptoms of chorioamnionitis  Extremities:  no calf tenderness, non edematous, DTR's: +2/4 bilateral lower extremities      Assessment/Plan:  Lana valles 30 y.o. female  at 33w2d IUP             Inpatient management of Vasa Previa                           - [facility] us yesterday               - S/P celestone  and , will plan for rescue course              - Plan for primary  section @ 35 weeks 1 day scheduled w/ Dr. Valdemar Duverney               - Continue ASA 81 mg daily      Hx Pulmonary Stenosis s/p Valvuloplasty (1yo)              - Echo : mild to moderate pulmonary insufficiency, stable from 2016               - Patient evaluated by cardiology and recommended f/u in 3 years

## 2020-12-16 NOTE — PROGRESS NOTES
OB/GYN PROGRESS NOTE    Diana Arredondo is a 30 y.o. female  at [facility] 13 Day: 16    Subjective:   Patient has been seen and examined. Patient is resting comofrtably, complaining of nothing at this time. Patient denies any vaginal discharge and any urinary complaints. The patient reports fetal movement is present, denies contractions, denies loss of fluid, denies vaginal bleeding. Patient denies headache, vision changes, nausea, vomiting, fever, chills, shortness of breath, chest pain, RUQ pain, abdominal pain, diarrhea, change in color/amount/odor of vaginal discharge, dysuria or, hematuria. Objective:   Vitals:  Vitals:    20 1451 20 2112 12/15/20 0852 12/15/20 2152   BP: 137/64 (!) 124/57 132/66 115/70   Pulse: 99 87 96 85   Resp: 16 16 16 18   Temp: 98.4 °F (36.9 °C) 98.2 °F (36.8 °C) 97.7 °F (36.5 °C) 97.7 °F (36.5 °C)   TempSrc:  Oral Oral Oral   Weight:       Height:            FHT: 135, moderate variability, accelerations present, decelerations absent  Contractions: none    Physical Exam:  General appearance:  no apparent distress, alert and cooperative  HEENT: head atraumatic, normocephalic, moist mucous membranes, trachea midline  Neurologic:  alert, oriented, normal speech, no focal findings or movement disorder noted  Lungs:  No increased work of breathing, good air exchange, clear to auscultation bilaterally, no crackles or wheezing  Heart:  regular rate and rhythm    Abdomen:  soft, gravid and non-tender  Extremities:  no calf tenderness  Musculoskeletal: Gross strength equal and intact throughout, no gross abnormalities, range of motion normal in hips, knees, shoulders and spine, CVA tenderness: none  Psychiatric: Mood appropriate, normal affect   Rectal Exam: not indicated    Diagnostics:     ECHO 20: Mild to moderate pulmonary insufficiency stable from 2016     [facility] scan : Succenturiate lobe, marginal cord and vasa previa all visualized again today. Lagging A/C. KELECHI 11.67 cm, EFW 3#10    Assessment/Plan:  Judith Arredondo is a 30 y.o. female  at 33w1d IUP   - Rh neg/ Rubella NI/ GBS neg   - No indication  for GBS prophylaxis    - Rhogam: 20; Rhogam hdz PP   - Influenza vaccination: 20   - Tdap vaccination: 20   - MMR Postpartum    - Continue PNV, SCDs daily   - NIPT neg   - COVID neg 20    Inpatient management of Vasa Previa    - VSS, Afebrile   - cEFM/TOCO: Cat 1   - CBC and T&S q3d next on 20   - General diet   - Heparin 5000 units BID for DVT prophylaxis   - Midline in place 20   - C/S consent 20   - NICU consult completed 12/3/20   - MFM scan: MFM  Succenturiate lobe, marginal cord and vasa previa all visualized again today. KELECHI 13.51 cm    - MFM scans on     - S/p Celestone , 12/3/20.  Will plan for rescue course 20   - C/S scheduled for 20    gHTN (new dx)     - BP currently normotensive   - Denies s/s of PreE   - Continue ASA   - PreE labs wnl x1, P/C 0.10 ()     Hx Pulmonary Stenosis s/p Valvuloplasty    - Patient has a history of congential pulmonary stenosis s/p balloon valvuloplasty at age 1   - ECHO 20: Mild to moderate pulmonary insufficiency stable from 2016    - Cardiology recommended follow up in 3 years   - Fetal ECHO wnl    Marginal Cord Insertion    - Continue following with MFM      Anterior Accessory Lobe    - Continue following with MFM      Lagging AC (<3rd percentile)    - NIPT neg      Anemia (Hgb 11.3>10.9>10.6)    - CBC on    - Patient remains asymptomatic       BMI 44.80     Patient Active Problem List    Diagnosis Date Noted    Celestone 20, 2020     Priority: High     screening for fetal growth retardation using ultrasonics     33 weeks gestation of pregnancy     Placenta marginalis in third trimester     Placenta succenturiata in third trimester     Poor fetal growth complicating pregnancy, antepartum     Gestational hypertension (G1) 12/05/2020    31 weeks gestation of pregnancy 12/01/2020    Vasa previa 11/29/2020     Overview Note:     Plan for inpatient management at 32 weeks.  Marginal cord insertion 11/29/2020    Lagging AC  11/29/2020    BMI 44.80 11/29/2020    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 Pt desires care with different 2018 [facility] records/ultrasound/labs  from previous provider from [address] [place] [address].  scanned into media. Pt moved from       Hx Pulmonary Stenosis s/p Valvuloplasty  07/14/2020     Overview Note:     Dx at 2 and underwent a balloon valvuloplasty at age 1 years, performed by Samira Murillo at [facility]  In [address] [place] [address]. Most recent visit to peds cardiology scanned into [email or web address]         Will update Tammy Gudino.      Will Mean, DO  Ob/Gyn Resident  12/16/2020, 8:01 AM

## 2020-12-16 NOTE — PROGRESS NOTES
OB/GYN PROGRESS NOTE     Wiliam Arredondo is a 30 y.o. female  at [facility] Day: 12     Subjective:   Patient with no changes overnight or throughout today.   States she is feeling good movement, no contractions, no vaginal bleeding    Vitals:   BP: 140/69  HR: 97  Resp: 16  Temp: 98.2        FHT: 130bpm, moderate variability, accelerations present, decelerations absent  Contractions: none     Physical Exam:  General appearance:  no apparent distress, alert and cooperative  Abdomen:  soft, gravid, non-tender, no rebound, guarding, or rigidity, no RUQ or epigastric tenderness, no signs or symptoms of abruption and no signs or symptoms of chorioamnionitis  Extremities:  no calf tenderness, non edematous, DTR's: +2/4 bilateral lower extremities      Assessment/Plan:  Zhane Search a 30 y.o. female  at 33w3d IUP             Inpatient management of Vasa Previa                           - MFM q Monday's weekly                - S/P celestone  and , will plan for rescue course              - Plan for primary  section @ 35 weeks 1 day scheduled w/ Dr. Mayo Pavon               - Continue ASA 81 mg daily     - elevated BP X 2 reads this am in 140/90 range, will repeat PreE baseline labs with next scheduled blood draw      Hx Pulmonary Stenosis s/p Valvuloplasty (3yo)              - Echo : mild to moderate pulmonary insufficiency, stable from 2016               - Patient evaluated by cardiology and recommended f/u in 3 years

## 2020-12-17 LAB
ABO/RH: NORMAL
ABO/RH: NORMAL
ABSOLUTE EOS #: 0.1 K/UL (ref 0–0.44)
ABSOLUTE IMMATURE GRANULOCYTE: 0.11 K/UL (ref 0–0.3)
ABSOLUTE LYMPH #: 1.9 K/UL (ref 1.1–3.7)
ABSOLUTE MONO #: 0.45 K/UL (ref 0.1–1.2)
ALBUMIN SERPL-MCNC: 3.1 G/DL (ref 3.5–5.2)
ALBUMIN/GLOBULIN RATIO: 1.1 (ref 1–2.5)
ALP BLD-CCNC: 100 U/L (ref 35–104)
ALT SERPL-CCNC: 19 U/L (ref 5–33)
ANION GAP SERPL CALCULATED.3IONS-SCNC: 9 MMOL/L (ref 9–17)
ANTIBODY IDENTIFICATION: NORMAL
ANTIBODY IDENTIFICATION: NORMAL
ANTIBODY SCREEN: NORMAL
ANTIBODY SCREEN: POSITIVE
ARM BAND NUMBER: NORMAL
ARM BAND NUMBER: NORMAL
AST SERPL-CCNC: 17 U/L
BASOPHILS # BLD: 0 % (ref 0–2)
BASOPHILS ABSOLUTE: <0.03 K/UL (ref 0–0.2)
BILIRUB SERPL-MCNC: 0.43 MG/DL (ref 0.3–1.2)
BLOOD BANK SPECIMEN: NORMAL
BUN BLDV-MCNC: 9 MG/DL (ref 6–20)
BUN/CREAT BLD: ABNORMAL (ref 9–20)
CALCIUM SERPL-MCNC: 8.8 MG/DL (ref 8.6–10.4)
CHLORIDE BLD-SCNC: 104 MMOL/L (ref 98–107)
CO2: 23 MMOL/L (ref 20–31)
CREAT SERPL-MCNC: 0.45 MG/DL (ref 0.5–0.9)
CREATININE URINE: 151.7 MG/DL (ref 28–217)
DIFFERENTIAL TYPE: ABNORMAL
EOSINOPHILS RELATIVE PERCENT: 1 % (ref 1–4)
EXPIRATION DATE: NORMAL
EXPIRATION DATE: NORMAL
GFR AFRICAN AMERICAN: >60 ML/MIN
GFR NON-AFRICAN AMERICAN: >60 ML/MIN
GFR SERPL CREATININE-BSD FRML MDRD: ABNORMAL ML/MIN/{1.73_M2}
GFR SERPL CREATININE-BSD FRML MDRD: ABNORMAL ML/MIN/{1.73_M2}
GLUCOSE BLD-MCNC: 89 MG/DL (ref 70–99)
HCT VFR BLD CALC: 33.6 % (ref 36.3–47.1)
HEMOGLOBIN: 10.5 G/DL (ref 11.9–15.1)
IMMATURE GRANULOCYTES: 1 %
LYMPHOCYTES # BLD: 21 % (ref 24–43)
MCH RBC QN AUTO: 28.8 PG (ref 25.2–33.5)
MCHC RBC AUTO-ENTMCNC: 31.3 G/DL (ref 28.4–34.8)
MCV RBC AUTO: 92.3 FL (ref 82.6–102.9)
MONOCYTES # BLD: 5 % (ref 3–12)
NRBC AUTOMATED: 0 PER 100 WBC
PDW BLD-RTO: 12.9 % (ref 11.8–14.4)
PLATELET # BLD: 178 K/UL (ref 138–453)
PLATELET ESTIMATE: ABNORMAL
PMV BLD AUTO: 10 FL (ref 8.1–13.5)
POTASSIUM SERPL-SCNC: 4.1 MMOL/L (ref 3.7–5.3)
RBC # BLD: 3.64 M/UL (ref 3.95–5.11)
RBC # BLD: ABNORMAL 10*6/UL
SEG NEUTROPHILS: 72 % (ref 36–65)
SEGMENTED NEUTROPHILS ABSOLUTE COUNT: 6.68 K/UL (ref 1.5–8.1)
SODIUM BLD-SCNC: 136 MMOL/L (ref 135–144)
TOTAL PROTEIN, URINE: 18 MG/DL
TOTAL PROTEIN: 5.8 G/DL (ref 6.4–8.3)
URINE TOTAL PROTEIN CREATININE RATIO: 0.12 (ref 0–0.2)
WBC # BLD: 9.3 K/UL (ref 3.5–11.3)
WBC # BLD: ABNORMAL 10*3/UL

## 2020-12-17 PROCEDURE — 1220000000 HC SEMI PRIVATE OB R&B

## 2020-12-17 PROCEDURE — 80053 COMPREHEN METABOLIC PANEL: CPT

## 2020-12-17 PROCEDURE — 6360000002 HC RX W HCPCS: Performed by: STUDENT IN AN ORGANIZED HEALTH CARE EDUCATION/TRAINING PROGRAM

## 2020-12-17 PROCEDURE — 84156 ASSAY OF PROTEIN URINE: CPT

## 2020-12-17 PROCEDURE — 6370000000 HC RX 637 (ALT 250 FOR IP): Performed by: STUDENT IN AN ORGANIZED HEALTH CARE EDUCATION/TRAINING PROGRAM

## 2020-12-17 PROCEDURE — 85025 COMPLETE CBC W/AUTO DIFF WBC: CPT

## 2020-12-17 PROCEDURE — 82570 ASSAY OF URINE CREATININE: CPT

## 2020-12-17 PROCEDURE — 86850 RBC ANTIBODY SCREEN: CPT

## 2020-12-17 PROCEDURE — 2580000003 HC RX 258: Performed by: STUDENT IN AN ORGANIZED HEALTH CARE EDUCATION/TRAINING PROGRAM

## 2020-12-17 PROCEDURE — 36415 COLL VENOUS BLD VENIPUNCTURE: CPT

## 2020-12-17 PROCEDURE — 96372 THER/PROPH/DIAG INJ SC/IM: CPT

## 2020-12-17 PROCEDURE — 86870 RBC ANTIBODY IDENTIFICATION: CPT

## 2020-12-17 PROCEDURE — 86900 BLOOD TYPING SEROLOGIC ABO: CPT

## 2020-12-17 PROCEDURE — 86901 BLOOD TYPING SEROLOGIC RH(D): CPT

## 2020-12-17 RX ADMIN — HEPARIN SODIUM 5000 UNITS: 5000 INJECTION INTRAVENOUS; SUBCUTANEOUS at 21:01

## 2020-12-17 RX ADMIN — ASPIRIN 81 MG: 81 TABLET ORAL at 09:11

## 2020-12-17 RX ADMIN — SODIUM CHLORIDE, PRESERVATIVE FREE 10 ML: 5 INJECTION INTRAVENOUS at 09:14

## 2020-12-17 RX ADMIN — SODIUM CHLORIDE, PRESERVATIVE FREE 10 ML: 5 INJECTION INTRAVENOUS at 21:02

## 2020-12-17 RX ADMIN — HEPARIN SODIUM 5000 UNITS: 5000 INJECTION INTRAVENOUS; SUBCUTANEOUS at 09:11

## 2020-12-17 NOTE — PROGRESS NOTES
OB/GYN PROGRESS NOTE    Maximiliano Arredondo is a 30 y.o. female  at [facility] 13 Day: 16    Subjective:   Patient has been seen and examined. Patient is resting comofrtably, complaining of nothing at this time. Patient denies any vaginal discharge and any urinary complaints. The patient reports fetal movement is present, denies contractions, denies loss of fluid, denies vaginal bleeding. Patient denies headache, vision changes, nausea, vomiting, fever, chills, shortness of breath, chest pain, RUQ pain, abdominal pain, diarrhea, change in color/amount/odor of vaginal discharge, dysuria or, hematuria. Objective:   Vitals:  Vitals:    20 0844 20 1202 20 2115 20 0749   BP: (!) 140/69 135/76 116/85 (!) 116/56   Pulse: 97 108 95 85   Resp:  18  20   Temp:  98.2 °F (36.8 °C) 98.3 °F (36.8 °C) 98.4 °F (36.9 °C)   TempSrc:  Oral Oral Oral   Weight:       Height:            FHT: 135, moderate variability, accelerations present, decelerations absent  Contractions: none    Physical Exam:  General appearance:  no apparent distress, alert and cooperative  HEENT: head atraumatic, normocephalic, moist mucous membranes, trachea midline  Neurologic:  alert, oriented, normal speech, no focal findings or movement disorder noted  Lungs:  No increased work of breathing, good air exchange, clear to auscultation bilaterally, no crackles or wheezing  Heart:  regular rate and rhythm    Abdomen:  soft, gravid and non-tender  Extremities:  no calf tenderness  Musculoskeletal: Gross strength equal and intact throughout, no gross abnormalities, range of motion normal in hips, knees, shoulders and spine, CVA tenderness: none  Psychiatric: Mood appropriate, normal affect   Rectal Exam: not indicated    Diagnostics:     ECHO 20: Mild to moderate pulmonary insufficiency stable from 2016     [facility] scan : Succenturiate lobe, marginal cord and vasa previa all visualized again today. Lagging A/C.  KELEHCI 11.67 cm, [facility] 3#10    Assessment/Plan:  Francois Arredondo is a 30 y.o. female  at 33w1d IUP   - Rh neg/ Rubella NI/ GBS neg   - No indication  for GBS prophylaxis    - Rhogam: 20; Rhogam hdz PP   - Influenza vaccination: 20   - Tdap vaccination: 20   - MMR Postpartum    - Continue PNV, SCDs daily   - NIPT neg   - COVID neg 20    Inpatient management of Vasa Previa    - VSS, Afebrile   - cEFM/TOCO: Cat 1   - CBC and T&S q3d next on 20   - General diet   - Heparin 5000 units BID for DVT prophylaxis   - Midline in place 20   - C/S consent 20   - NICU consult completed 12/3/20   - MFM scan: MFM  Succenturiate lobe, marginal cord and vasa previa all visualized again today. KELECHI 13.51 cm    - MFM scans on     - S/p Celestone , . Will plan for rescue course 20   - C/S scheduled for 20    gHTN (new dx)     - BP currently normotensive   - Denies s/s of PreE   - Continue ASA   - PreE labs wnl x2, P/C 0.10 ().  P/C pending     Hx Pulmonary Stenosis s/p Valvuloplasty    - Patient has a history of congential pulmonary stenosis s/p balloon valvuloplasty at age 1   - ECHO 20: Mild to moderate pulmonary insufficiency stable from 2016    - Cardiology recommended follow up in 3 years   - Fetal ECHO wnl    Marginal Cord Insertion    - Continue following with MFM      Anterior Accessory Lobe    - Continue following with MFM      Lagging AC (<3rd percentile)    - NIPT neg      Anemia (Hgb 11.3>10.9>10.6>10.5)    - CBC on    - Patient remains asymptomatic       BMI 44.80     Patient Active Problem List    Diagnosis Date Noted    Celestone 20, 2020     Priority: High     screening for fetal growth retardation using ultrasonics     33 weeks gestation of pregnancy     Placenta marginalis in third trimester     Placenta succenturiata in third trimester     Poor fetal growth complicating pregnancy, antepartum     Gestational hypertension (G1) 12/05/2020    31 weeks gestation of pregnancy 12/01/2020    Vasa previa 11/29/2020     Overview Note:     Plan for inpatient management at 32 weeks.  Marginal cord insertion 11/29/2020    Lagging AC  11/29/2020    BMI 44.80 11/29/2020    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 Pt desires care with different 2018 [facility] records/ultrasound/labs  from previous provider from [address] [place] [address].  scanned into media. Pt moved from       Hx Pulmonary Stenosis s/p Valvuloplasty  07/14/2020     Overview Note:     Dx at 2 and underwent a balloon valvuloplasty at age 1 years, performed by Ora Mendes at [facility]  In [facility]. Most recent visit to peds cardiology scanned into [employer]         Will update Devaughn Conti.      Ana Wilcox DO  Ob/Gyn Resident  12/17/2020, 9:46 AM

## 2020-12-17 NOTE — CARE COORDINATION
TRANSITIONAL CARE PLANNING/ [facility] Day: 17    Reason for Admission: 31 weeks gestation of pregnancy [Z3A.31]   Vasa Previa    FHT: 135, moderate variability, accelerations present, decelerations absent  Contractions: none  MFM scan 12/14: Succenturiate lobe, marginal cord and vasa previa all visualized again today. Lagging A/C. KELECHI 11.67 cm, EFW 3#10    Treatment Plan of Care:   VS per L and D protocol  Cefm and TOCO (Cat1)  PNV, SCDs daily  CBC and T&S q3d next on 12/20/20  General diet  Heparin 5000 units BID for DVT prophylaxis  MFM scans on Mondays   S/p Celestone 12/1, 12/2. Will plan for rescue course 12/19/20  Continue ASA    Tests/Procedures still needed: MMR Postpartum, Rhogam hdz PP, CBC and T&S q3d next on 12/20/20    Barriers to Discharge: Delivering 12/28    Readmission Risk 9     Patient goals/Treatment Preferences/Transitional Plan: C/S scheduled for 12/28/20    Referrals Made: NICU, MFM,     Follow Up needed: After delivery       Mom - Cardiology follow up in June 2023 see notes.

## 2020-12-18 PROCEDURE — 6360000002 HC RX W HCPCS: Performed by: STUDENT IN AN ORGANIZED HEALTH CARE EDUCATION/TRAINING PROGRAM

## 2020-12-18 PROCEDURE — 6370000000 HC RX 637 (ALT 250 FOR IP): Performed by: STUDENT IN AN ORGANIZED HEALTH CARE EDUCATION/TRAINING PROGRAM

## 2020-12-18 PROCEDURE — 2580000003 HC RX 258: Performed by: STUDENT IN AN ORGANIZED HEALTH CARE EDUCATION/TRAINING PROGRAM

## 2020-12-18 PROCEDURE — 96372 THER/PROPH/DIAG INJ SC/IM: CPT

## 2020-12-18 PROCEDURE — 1220000000 HC SEMI PRIVATE OB R&B

## 2020-12-18 RX ADMIN — SODIUM CHLORIDE, PRESERVATIVE FREE 10 ML: 5 INJECTION INTRAVENOUS at 21:41

## 2020-12-18 RX ADMIN — SODIUM CHLORIDE, PRESERVATIVE FREE 10 ML: 5 INJECTION INTRAVENOUS at 08:53

## 2020-12-18 RX ADMIN — HEPARIN SODIUM 5000 UNITS: 5000 INJECTION INTRAVENOUS; SUBCUTANEOUS at 21:38

## 2020-12-18 RX ADMIN — ASPIRIN 81 MG: 81 TABLET ORAL at 08:52

## 2020-12-18 RX ADMIN — HEPARIN SODIUM 5000 UNITS: 5000 INJECTION INTRAVENOUS; SUBCUTANEOUS at 08:53

## 2020-12-18 NOTE — PROGRESS NOTES
OB/GYN PROGRESS NOTE    Lilia Arredondo is a 30 y.o. female  at [address] Day: 25    Subjective:   Patient has been seen and examined. Patient is doing well and has no complaints this AM. Patient denies any vaginal discharge and any urinary complaints. The patient reports fetal movement is present, denies contractions, denies loss of fluid, denies vaginal bleeding. Patient denies headache, vision changes, nausea, vomiting, fever, chills, shortness of breath, chest pain, RUQ pain, abdominal pain, diarrhea, change in color/amount/odor of vaginal discharge, dysuria or, hematuria.      Objective:   Vitals:  Vitals:    20 1249 20 1558 20 1953 20 0806   BP: 125/62 134/64 125/75 122/69   Pulse: 91 92 97 91   Resp:    Temp: 98.8 °F (37.1 °C) 97.7 °F (36.5 °C) 98.1 °F (36.7 °C) 98.6 °F (37 °C)   TempSrc: Oral Oral Oral Oral   Weight:       Height:             FHT: 145bpm, moderate variability, accelerations present, decelerations absent  Contractions: none    Physical Exam:  General appearance:  no apparent distress, alert and cooperative  HEENT: head atraumatic, normocephalic, moist mucous membranes, trachea midline  Neurologic:  alert, oriented, normal speech, no focal findings or movement disorder noted  Lungs:  No increased work of breathing, good air exchange, clear to auscultation bilaterally, no crackles or wheezing  Heart:  regular rate and rhythm and no murmur    Abdomen:  soft, gravid, non-tender, no rebound, guarding, or rigidity, no RUQ or epigastric tenderness, no signs or symptoms of abruption and no signs or symptoms of chorioamnionitis  Extremities:  no calf tenderness, non edematous, DTR's: +2/4 bilateral lower extremities   Musculoskeletal: Gross strength equal and intact throughout, no gross abnormalities, range of motion normal in hips, knees, shoulders and spine, CVA tenderness: none  Psychiatric: Mood appropriate, normal affect   Rectal Exam: not indicated Cord Insertion              - Continue following with MFM     Anterior Accessory Lobe              - Continue following with MFM     Lagging AC (<3rd percentile)              - NIPT wnl              - Continue following with MFM     Anemia (Hgb 10.6)              - CBC on               - Patient remains asymptomatic      BMI 44.80              - DVT prophylaxis: Heparin and SCDs    Patient Active Problem List    Diagnosis Date Noted    Celestone 20, 2020     Priority: High     screening for fetal growth retardation using ultrasonics     33 weeks gestation of pregnancy     Placenta marginalis in third trimester     Placenta succenturiata in third trimester     Poor fetal growth complicating pregnancy, antepartum     Gestational hypertension (G1) 2020    31 weeks gestation of pregnancy 2020    Vasa previa 2020     Overview Note:     Plan for inpatient management at 32 weeks.  Marginal cord insertion 2020    Lagging AC  2020    BMI 44.80 2020    Rh neg/RNI/GBS unk 2020     Overview Note:     Intake not completed. Pt desires care with different [address] [place] [address] records/ultrasound/labs  from previous provider from [address] [place] [address].  scanned into media. Pt moved from       Hx Pulmonary Stenosis s/p Valvuloplasty  2020     Overview Note:     Dx at 2 and underwent a balloon valvuloplasty at age 1 years, performed by Jannette Valadez at [facility]  In [address] [place] [address]. Most recent visit to peds cardiology scanned into [employer]         Will update Dr. Kulwinder López DO  Ob/Gyn Resident  2020, 10:54 AM            Attending Physician Statement  I have discussed the care of Yeny Arredondo, including pertinent history and exam findings,  with the resident. I have seen and examined the patient and the key elements of all parts of the encounter have been performed by me.  I agree with the assessment, plan and orders as documented by the resident.  (GC Modifier)     Pt seen and examined.  Plan of care discussed in detail and all questions answered.  Denies VB, CTX, LOF.  +FM. Vitals:    20 0806 20 1229 20 0823   BP: 122/69 122/74 112/71 122/68   Pulse: 91 82 94 84   Resp:    Temp: 98.6 °F (37 °C) 98.4 °F (36.9 °C) 98.3 °F (36.8 °C) 98.4 °F (36.9 °C)   TempSrc: Oral Oral Oral Oral   Weight:       Height:         No results found for this or any previous visit (from the past 24 hour(s)).                        @ 33w5d              - MFM q weekly              - Heparin BID              - Vitals per protocol              - Intermittent fetal monitoring is fine if tracing reassuring.   Rhogam given   Vasa Previa              - C/S scheduled              - MFM ultrasound weekly              - Rescue steroids scheduled  gHTN              - normotensive              - no s/s of preeclampsia  Hx of congenital pulmonary stenosis              - ECHO has been stable              - Fetal Echo normal     TRACIE Neri, DO

## 2020-12-19 PROCEDURE — 1220000000 HC SEMI PRIVATE OB R&B

## 2020-12-19 PROCEDURE — 96372 THER/PROPH/DIAG INJ SC/IM: CPT

## 2020-12-19 PROCEDURE — 6360000002 HC RX W HCPCS: Performed by: STUDENT IN AN ORGANIZED HEALTH CARE EDUCATION/TRAINING PROGRAM

## 2020-12-19 PROCEDURE — 6370000000 HC RX 637 (ALT 250 FOR IP): Performed by: STUDENT IN AN ORGANIZED HEALTH CARE EDUCATION/TRAINING PROGRAM

## 2020-12-19 PROCEDURE — 2580000003 HC RX 258: Performed by: STUDENT IN AN ORGANIZED HEALTH CARE EDUCATION/TRAINING PROGRAM

## 2020-12-19 RX ADMIN — ASPIRIN 81 MG: 81 TABLET ORAL at 09:10

## 2020-12-19 RX ADMIN — BETAMETHASONE SODIUM PHOSPHATE AND BETAMETHASONE ACETATE 12 MG: 3; 3 INJECTION, SUSPENSION INTRA-ARTICULAR; INTRALESIONAL; INTRAMUSCULAR; SOFT TISSUE at 09:10

## 2020-12-19 RX ADMIN — HEPARIN SODIUM 5000 UNITS: 5000 INJECTION INTRAVENOUS; SUBCUTANEOUS at 09:10

## 2020-12-19 RX ADMIN — SODIUM CHLORIDE, PRESERVATIVE FREE 10 ML: 5 INJECTION INTRAVENOUS at 09:11

## 2020-12-19 RX ADMIN — HEPARIN SODIUM 5000 UNITS: 5000 INJECTION INTRAVENOUS; SUBCUTANEOUS at 21:34

## 2020-12-19 RX ADMIN — SODIUM CHLORIDE, PRESERVATIVE FREE 10 ML: 5 INJECTION INTRAVENOUS at 21:37

## 2020-12-19 NOTE — PROGRESS NOTES
OB/GYN PROGRESS NOTE    Maurilio Arredondo is a 30 y.o. female  at [facility] 13 Day: 23    Subjective:   Patient has been seen and examined. Patient is resting comofrtably, complaining of nothing at this time. Patient denies any vaginal discharge and any urinary complaints. The patient reports fetal movement is present, denies contractions, denies loss of fluid, denies vaginal bleeding. Patient denies headache, vision changes, nausea, vomiting, fever, chills, shortness of breath, chest pain, RUQ pain, abdominal pain, diarrhea, change in color/amount/odor of vaginal discharge, dysuria or, hematuria. Objective:   Vitals:  Vitals:    20 0806 20 1229 20 0823   BP: 122/69 122/74 112/71 122/68   Pulse: 91 82 94 84   Resp:    Temp: 98.6 °F (37 °C) 98.4 °F (36.9 °C) 98.3 °F (36.8 °C) 98.4 °F (36.9 °C)   TempSrc: Oral Oral Oral Oral   Weight:       Height:            FHT: 135, moderate variability, accelerations present, decelerations absent  Contractions: none    Physical Exam:  General appearance:  no apparent distress, alert and cooperative  HEENT: head atraumatic, normocephalic, moist mucous membranes, trachea midline  Neurologic:  alert, oriented, normal speech, no focal findings or movement disorder noted  Lungs:  No increased work of breathing, good air exchange, clear to auscultation bilaterally, no crackles or wheezing  Heart:  regular rate and rhythm    Abdomen:  soft, gravid and non-tender  Extremities:  no calf tenderness  Musculoskeletal: Gross strength equal and intact throughout, no gross abnormalities, range of motion normal in hips, knees, shoulders and spine, CVA tenderness: none  Psychiatric: Mood appropriate, normal affect   Rectal Exam: not indicated    Diagnostics:     ECHO 20: Mild to moderate pulmonary insufficiency stable from 2016     [facility] scan : Succenturiate lobe, marginal cord and vasa previa all visualized again today. Lagging A/C. KELECHI 11.67 cm, EFW 3#10    Assessment/Plan:  Rissa Dose Minor is a 30 y.o. female  at 33w1d IUP   - Rh neg/ Rubella NI/ GBS neg   - No indication  for GBS prophylaxis    - Rhogam: 20; Rhogam hdz PP   - Influenza vaccination: 20   - Tdap vaccination: 20   - MMR Postpartum    - Continue PNV, SCDs daily   - NIPT neg   - COVID neg 20    Inpatient management of Vasa Previa    - VSS, Afebrile   - cEFM/TOCO: Cat 1   - CBC and T&S q3d next on 20   - General diet   - Heparin 5000 units BID for DVT prophylaxis   - Midline in place 20   - C/S consent 20   - NICU consult completed 12/3/20   - MFM scan: MFM  Succenturiate lobe, marginal cord and vasa previa all visualized again today. KELECHI 13.51 cm    - MFM scans on     - S/p Celestone , .  Celestone x1 given, 2nd tomorrow at 0910   - C/S scheduled for 20    gHTN (new dx)     - BP currently normotensive   - Denies s/s of PreE   - Continue ASA   - PreE labs wnl x2, 0.12    Hx Pulmonary Stenosis s/p Valvuloplasty    - Patient has a history of congential pulmonary stenosis s/p balloon valvuloplasty at age 1   - ECHO 20: Mild to moderate pulmonary insufficiency stable from 2016    - Cardiology recommended follow up in 3 years   - Fetal ECHO wnl    Marginal Cord Insertion    - Continue following with MFM      Anterior Accessory Lobe    - Continue following with MFM      Lagging AC (<3rd percentile)    - NIPT neg      Anemia (Hgb 11.3>10.9>10.6>10.5)    - CBC on    - Patient remains asymptomatic       BMI 44.80     Patient Active Problem List    Diagnosis Date Noted    Celestone 20, 2020     Priority: High     screening for fetal growth retardation using ultrasonics     33 weeks gestation of pregnancy     Placenta marginalis in third trimester     Placenta succenturiata in third trimester     Poor fetal growth complicating pregnancy, antepartum     Gestational hypertension (G1) 2020    31 weeks gestation of pregnancy 2020    Vasa previa 2020     Overview Note:     Plan for inpatient management at 32 weeks.  Marginal cord insertion 2020    Lagging AC  2020    BMI 44.80 2020    Rh neg/RNI/GBS unk 2020     Overview Note:     Intake not completed. Pt desires care with different  [facility] records/ultrasound/labs  from previous provider from [address] [place] [address].  scanned into media. Pt moved from       Hx Pulmonary Stenosis s/p Valvuloplasty  2020     Overview Note:     Dx at 2 and underwent a balloon valvuloplasty at age 1 years, performed by Jeannette Burnett at [facility]  In [address] [place] [address]. Most recent visit to peds cardiology scanned into [employer]         Will update [facility], DO  Ob/Gyn Resident  2020, 9:28 AM        Attending Physician Statement  I have discussed the care of [facility] Minor, including pertinent history and exam findings,  with the resident. I have reviewed the key elements of all parts of the encounter with the resident. I agree with the assessment, plan and orders as documented by the resident. (GE Modifier)    Pt seen and examined.  Plan of care discussed in detail and all questions answered.  Denies VB, CTX, LOF.  +FM.    Vitals:    20 0823 20 1158 20 1602 20   BP: 122/68 (!) 120/59 129/62 120/61   Pulse: 84 92 97 100   Resp: 16 16 18 17   Temp: 98.4 °F (36.9 °C) 98.6 °F (37 °C) 98.6 °F (37 °C) 98.4 °F (36.9 °C)   TempSrc: Oral Oral Oral Oral   Weight:       Height:         No results found for this or any previous visit (from the past 24 hour(s)).                          @ 34w0dd              - MFM q weekly              - Heparin BID              - Vitals per protocol              - Intermittent fetal monitoring is fine if tracing reassuring.   Rhogam given   Vasa Previa              - C/S scheduled              - MFM ultrasound weekly              - Rescue steroids scheduled  gHTN              - normotensive              - no s/s of preeclampsia  Hx of congenital pulmonary stenosis              - ECHO has been stable              - Fetal Echo normal     TRACIE Neri, DO

## 2020-12-20 LAB
ABSOLUTE EOS #: <0.03 K/UL (ref 0–0.44)
ABSOLUTE IMMATURE GRANULOCYTE: 0.25 K/UL (ref 0–0.3)
ABSOLUTE LYMPH #: 1.3 K/UL (ref 1.1–3.7)
ABSOLUTE MONO #: 0.38 K/UL (ref 0.1–1.2)
BASOPHILS # BLD: 0 % (ref 0–2)
BASOPHILS ABSOLUTE: <0.03 K/UL (ref 0–0.2)
BLOOD BANK SPECIMEN: NORMAL
DIFFERENTIAL TYPE: ABNORMAL
EOSINOPHILS RELATIVE PERCENT: 0 % (ref 1–4)
HCT VFR BLD CALC: 33.7 % (ref 36.3–47.1)
HEMOGLOBIN: 10.7 G/DL (ref 11.9–15.1)
IMMATURE GRANULOCYTES: 2 %
LYMPHOCYTES # BLD: 10 % (ref 24–43)
MCH RBC QN AUTO: 28.8 PG (ref 25.2–33.5)
MCHC RBC AUTO-ENTMCNC: 31.8 G/DL (ref 28.4–34.8)
MCV RBC AUTO: 90.6 FL (ref 82.6–102.9)
MONOCYTES # BLD: 3 % (ref 3–12)
NRBC AUTOMATED: 0 PER 100 WBC
PDW BLD-RTO: 12.6 % (ref 11.8–14.4)
PLATELET # BLD: 212 K/UL (ref 138–453)
PLATELET ESTIMATE: ABNORMAL
PMV BLD AUTO: 9.9 FL (ref 8.1–13.5)
RBC # BLD: 3.72 M/UL (ref 3.95–5.11)
RBC # BLD: ABNORMAL 10*6/UL
SEG NEUTROPHILS: 85 % (ref 36–65)
SEGMENTED NEUTROPHILS ABSOLUTE COUNT: 11.04 K/UL (ref 1.5–8.1)
WBC # BLD: 13 K/UL (ref 3.5–11.3)
WBC # BLD: ABNORMAL 10*3/UL

## 2020-12-20 PROCEDURE — 36415 COLL VENOUS BLD VENIPUNCTURE: CPT

## 2020-12-20 PROCEDURE — 85025 COMPLETE CBC W/AUTO DIFF WBC: CPT

## 2020-12-20 PROCEDURE — 86870 RBC ANTIBODY IDENTIFICATION: CPT

## 2020-12-20 PROCEDURE — 6370000000 HC RX 637 (ALT 250 FOR IP): Performed by: STUDENT IN AN ORGANIZED HEALTH CARE EDUCATION/TRAINING PROGRAM

## 2020-12-20 PROCEDURE — 86901 BLOOD TYPING SEROLOGIC RH(D): CPT

## 2020-12-20 PROCEDURE — 96372 THER/PROPH/DIAG INJ SC/IM: CPT

## 2020-12-20 PROCEDURE — 86850 RBC ANTIBODY SCREEN: CPT

## 2020-12-20 PROCEDURE — 1220000000 HC SEMI PRIVATE OB R&B

## 2020-12-20 PROCEDURE — 2580000003 HC RX 258: Performed by: STUDENT IN AN ORGANIZED HEALTH CARE EDUCATION/TRAINING PROGRAM

## 2020-12-20 PROCEDURE — 6360000002 HC RX W HCPCS: Performed by: STUDENT IN AN ORGANIZED HEALTH CARE EDUCATION/TRAINING PROGRAM

## 2020-12-20 PROCEDURE — 86900 BLOOD TYPING SEROLOGIC ABO: CPT

## 2020-12-20 RX ORDER — DOCUSATE SODIUM 100 MG/1
100 CAPSULE, LIQUID FILLED ORAL DAILY PRN
Status: DISCONTINUED | OUTPATIENT
Start: 2020-12-20 | End: 2020-12-28

## 2020-12-20 RX ADMIN — ASPIRIN 81 MG: 81 TABLET ORAL at 10:09

## 2020-12-20 RX ADMIN — HEPARIN SODIUM 5000 UNITS: 5000 INJECTION INTRAVENOUS; SUBCUTANEOUS at 21:18

## 2020-12-20 RX ADMIN — BETAMETHASONE SODIUM PHOSPHATE AND BETAMETHASONE ACETATE 12 MG: 3; 3 INJECTION, SUSPENSION INTRA-ARTICULAR; INTRALESIONAL; INTRAMUSCULAR; SOFT TISSUE at 10:09

## 2020-12-20 RX ADMIN — HEPARIN SODIUM 5000 UNITS: 5000 INJECTION INTRAVENOUS; SUBCUTANEOUS at 10:10

## 2020-12-20 RX ADMIN — SODIUM CHLORIDE, PRESERVATIVE FREE 10 ML: 5 INJECTION INTRAVENOUS at 21:18

## 2020-12-20 RX ADMIN — SODIUM CHLORIDE, PRESERVATIVE FREE 10 ML: 5 INJECTION INTRAVENOUS at 10:10

## 2020-12-20 NOTE — PROGRESS NOTES
OB/GYN PROGRESS NOTE    West Arredondo is a 30 y.o. female  at 31w0d, Hospital Day: 20    Subjective:   Patient has been seen and examined. Patient is resting comfortably, complaining of nothing. Patient denies any vaginal discharge and any urinary complaints. The patient reports fetal movement is present, denies contractions, denies loss of fluid, denies vaginal bleeding. Patient denies headache, vision changes, nausea, vomiting, fever, chills, shortness of breath, chest pain, RUQ pain, abdominal pain, diarrhea, change in color/amount/odor of vaginal discharge, dysuria or, hematuria. Objective:   Vitals:  Vitals:    20 1158 20 1602 20 1018   BP: (!) 120/59 129/62 120/61 (!) 131/58   Pulse: 92 97 100 94   Resp: 16 18 17 16   Temp: 98.6 °F (37 °C) 98.6 °F (37 °C) 98.4 °F (36.9 °C) 98.2 °F (36.8 °C)   TempSrc: Oral Oral Oral Oral   Weight:       Height:             FHT: 135, moderate variability, accelerations present, decelerations absent  Contractions: none    Physical Exam:  General appearance:  no apparent distress, alert and cooperative  HEENT: head atraumatic, normocephalic, moist mucous membranes, trachea midline  Neurologic:  alert, oriented, normal speech, no focal findings or movement disorder noted  Lungs:  No increased work of breathing, good air exchange, clear to auscultation bilaterally, no crackles or wheezing  Heart:  regular rate and rhythm and no murmur    Abdomen:  soft, gravid and non-tender  Extremities:  no calf tenderness, non edematous  Musculoskeletal: Gross strength equal and intact throughout, no gross abnormalities, range of motion normal in hips, knees, shoulders and spine, CVA tenderness: none  Psychiatric: Mood appropriate, normal affect   Rectal Exam: not indicated  Pelvic Exam: Not indicated     DATA:  Labs:   Ordered, not completed.   Lab called       Assessment/Plan:  Carolee Ulloa is a 30 y.o. female  at 34w0d IUP - Rh neg/ Rubella NI/ GBS neg              - No indication  for GBS prophylaxis               - Rhogam: 11/13/20; Rhogam hdz PP              - Influenza vaccination: 11/13/20              - Tdap vaccination: 11/24/20              - MMR Postpartum               - Continue PNV, SCDs daily              - NIPT neg              - COVID neg 12/1/20     Inpatient management of Vasa Previa               - VSS, Afebrile              - cEFM/TOCO: Cat 1              - CBC and T&S q3d next today              - General diet              - Heparin 5000 units BID for DVT prophylaxis              - Midline in place 12/4/20              - C/S consent 12/1/20              - NICU consult completed 12/3/20              - MFM scan: MFM 12/7 Succenturiate lobe, marginal cord and vasa previa all visualized again today.  KELECHI 13.51 cm               - MFM scans on Mondays               - S/p Celestone 12/1, 12/2 and 12/19, 12/20              - C/S scheduled for 12/28/20    gHTN (new dx)                - BP currently normotensive              - Denies s/s of PreE              - Continue ASA              - PreE labs wnl x2, 0.12     Hx Pulmonary Stenosis s/p Valvuloplasty               - Patient has a history of congential pulmonary stenosis s/p balloon valvuloplasty at age 1              - ECHO 6/23/20: Mild to moderate pulmonary insufficiency stable from 5/2016               - Cardiology recommended follow up in 3 years              - Fetal ECHO wnl     Marginal Cord Insertion               - Continue following with MFM      Anterior Accessory Lobe               - Continue following with MFM      Lagging AC (<3rd percentile)               - NIPT neg      Anemia (Hgb 11.3>10.9>10.6>10.5)               - CBC on 12/20              - Patient remains asymptomatic       BMI 44.80     Patient Active Problem List    Diagnosis Date Noted    Celestone 12/1/20, 12/2 12/01/2020     Priority: High   screening for fetal growth retardation using ultrasonics     33 weeks gestation of pregnancy     Placenta marginalis in third trimester     Placenta succenturiata in third trimester     Poor fetal growth complicating pregnancy, antepartum     Gestational hypertension (G1) 2020    31 weeks gestation of pregnancy 2020    Vasa previa 2020     Overview Note:     Plan for inpatient management at 32 weeks.  Marginal cord insertion 2020    Lagging AC  2020    BMI 44.80 2020    Rh neg/RNI/GBS unk 2020     Overview Note:     Intake not completed. Pt desires care with different [facility] records/ultrasound/labs  from previous provider from [place].  scanned into media. Pt moved from        Pulmonary Stenosis s/p Valvuloplasty  2020     Overview Note:     Dx at 2 and underwent a balloon valvuloplasty at age 1 years, performed by Angel Quesada at [facility]  In [place].    Most recent visit to peds cardiology scanned into [address] [place] [address]  Ob/Gyn Resident  2020, 3:00 PM

## 2020-12-21 ENCOUNTER — ANESTHESIA (OUTPATIENT)
Dept: LABOR AND DELIVERY | Age: 25
End: 2020-12-21

## 2020-12-21 ENCOUNTER — ANESTHESIA EVENT (OUTPATIENT)
Dept: LABOR AND DELIVERY | Age: 25
End: 2020-12-21

## 2020-12-21 PROCEDURE — 76820 UMBILICAL ARTERY ECHO: CPT | Performed by: OBSTETRICS & GYNECOLOGY

## 2020-12-21 PROCEDURE — 6360000002 HC RX W HCPCS: Performed by: STUDENT IN AN ORGANIZED HEALTH CARE EDUCATION/TRAINING PROGRAM

## 2020-12-21 PROCEDURE — 1220000000 HC SEMI PRIVATE OB R&B

## 2020-12-21 PROCEDURE — 76817 TRANSVAGINAL US OBSTETRIC: CPT | Performed by: OBSTETRICS & GYNECOLOGY

## 2020-12-21 PROCEDURE — 2580000003 HC RX 258: Performed by: STUDENT IN AN ORGANIZED HEALTH CARE EDUCATION/TRAINING PROGRAM

## 2020-12-21 PROCEDURE — 6370000000 HC RX 637 (ALT 250 FOR IP): Performed by: STUDENT IN AN ORGANIZED HEALTH CARE EDUCATION/TRAINING PROGRAM

## 2020-12-21 PROCEDURE — 76815 OB US LIMITED FETUS(S): CPT | Performed by: OBSTETRICS & GYNECOLOGY

## 2020-12-21 PROCEDURE — 76821 MIDDLE CEREBRAL ARTERY ECHO: CPT | Performed by: OBSTETRICS & GYNECOLOGY

## 2020-12-21 PROCEDURE — 96372 THER/PROPH/DIAG INJ SC/IM: CPT

## 2020-12-21 PROCEDURE — 76819 FETAL BIOPHYS PROFIL W/O NST: CPT | Performed by: OBSTETRICS & GYNECOLOGY

## 2020-12-21 RX ADMIN — SODIUM CHLORIDE, PRESERVATIVE FREE 10 ML: 5 INJECTION INTRAVENOUS at 09:36

## 2020-12-21 RX ADMIN — SODIUM CHLORIDE, PRESERVATIVE FREE 10 ML: 5 INJECTION INTRAVENOUS at 21:12

## 2020-12-21 RX ADMIN — HEPARIN SODIUM 5000 UNITS: 5000 INJECTION INTRAVENOUS; SUBCUTANEOUS at 21:05

## 2020-12-21 RX ADMIN — ASPIRIN 81 MG: 81 TABLET ORAL at 09:35

## 2020-12-21 RX ADMIN — HEPARIN SODIUM 5000 UNITS: 5000 INJECTION INTRAVENOUS; SUBCUTANEOUS at 09:35

## 2020-12-21 ASSESSMENT — ENCOUNTER SYMPTOMS: SHORTNESS OF BREATH: 0

## 2020-12-21 NOTE — PROGRESS NOTES
Obstetric/Gynecology Maternal Fetal Medicine Resident Note    Patient seen and scanned at [place] [address] office today. Succenturiate lobe, marginal cord and vasa previa all visualized again today. BPP 8/8 KELECHI 12.11 cm. Delivery scheduled for 35w1d, one week from today, therefore patient is not scheduled to return to [facility] office. Celestone initial and rescue course completed.      Dr. Kelly De Guzman and L&D team updated     DO TETE Bush Resident, PGY2  [facility]  12/21/2020, 11:54 AM

## 2020-12-21 NOTE — PROGRESS NOTES
OB/GYN PROGRESS NOTE    Vedia Curling Minor is a 30 y.o. female  at [facility] 13 Day: 21    Subjective:   Patient has been seen and examined. Patient is resting comofrtably, complaining of nothing at this time. Patient denies any vaginal discharge and any urinary complaints. The patient reports fetal movement is present, denies contractions, denies loss of fluid, denies vaginal bleeding. Patient denies headache, vision changes, nausea, vomiting, fever, chills, shortness of breath, chest pain, RUQ pain, abdominal pain, diarrhea, change in color/amount/odor of vaginal discharge, dysuria or, hematuria.      Objective:   Vitals:  Vitals:    20 1018 20 0802   BP: 120/61 (!) 131/58 129/66 121/61   Pulse: 100 94 95 88   Resp: 17 16  16   Temp: 98.4 °F (36.9 °C) 98.2 °F (36.8 °C) 98.1 °F (36.7 °C) 98.1 °F (36.7 °C)   TempSrc: Oral Oral Oral    Weight:       Height:            FHT: 135, moderate variability, accelerations present, decelerations absent  Contractions: none    Physical Exam:  General appearance:  no apparent distress, alert and cooperative  HEENT: head atraumatic, normocephalic, moist mucous membranes, trachea midline  Neurologic:  alert, oriented, normal speech, no focal findings or movement disorder noted  Lungs:  No increased work of breathing, good air exchange, clear to auscultation bilaterally, no crackles or wheezing  Heart:  regular rate and rhythm    Abdomen:  soft, gravid and non-tender  Extremities:  no calf tenderness  Musculoskeletal: Gross strength equal and intact throughout, no gross abnormalities, range of motion normal in hips, knees, shoulders and spine, CVA tenderness: none  Psychiatric: Mood appropriate, normal affect   Rectal Exam: not indicated    Diagnostics:     ECHO 20: Mild to moderate pulmonary insufficiency stable from 2016 MFM scan : Succenturiate lobe, marginal cord and vasa previa all visualized again today. BPP  KELECHI 12.11 cm    Assessment/Plan:  Maximiliano Arredondo is a 30 y.o. female  at 33w1d IUP   - Rh neg/ Rubella NI/ GBS neg   - No indication  for GBS prophylaxis    - Rhogam: 20; Rhogam hdz PP   - Influenza vaccination: 20   - Tdap vaccination: 20   - MMR Postpartum    - Continue PNV, SCDs daily   - NIPT neg   - COVID neg 20    Inpatient management of Vasa Previa    - VSS, Afebrile   - cEFM/TOCO: Cat 1   - CBC and T&S q3d next on 20   - General diet   - Heparin 5000 units BID for DVT prophylaxis   - Midline in place 20   - C/S consent 20   - NICU consult completed 12/3/20   - MFM scans on     - S/p Celestone , , ,    - C/S scheduled for 20    gHTN (new dx)     - BP currently normotensive   - Denies s/s of PreE   - Continue ASA   - PreE labs wnl x2, P/C 0.10 ().  P/C 0.12    Hx Pulmonary Stenosis s/p Valvuloplasty    - Patient has a history of congential pulmonary stenosis s/p balloon valvuloplasty at age 1   - ECHO 20: Mild to moderate pulmonary insufficiency stable from 2016    - Cardiology recommended follow up in 3 years   - Fetal ECHO wnl   - Will consult Anesthesia     Marginal Cord Insertion    - Continue following with MFM      Anterior Accessory Lobe    - Continue following with MFM      Lagging AC (<3rd percentile)    - NIPT neg      Anemia (Hgb 11.3>>10.6)    - CBC on    - Patient remains asymptomatic       BMI 44.80     Patient Active Problem List    Diagnosis Date Noted    Celestone 20, 2020     Priority: High     screening for fetal growth retardation using ultrasonics     33 weeks gestation of pregnancy     Placenta marginalis in third trimester     Placenta succenturiata in third trimester     Poor fetal growth complicating pregnancy, antepartum     Gestational hypertension (G1) 2020  31 weeks gestation of pregnancy 12/01/2020    Vasa previa 11/29/2020     Overview Note:     Plan for inpatient management at 32 weeks.  Marginal cord insertion 11/29/2020    Lagging AC  11/29/2020    BMI 44.80 11/29/2020    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 Pt desires care with different 2018 [facility] records/ultrasound/labs  from previous provider from [address] [place] [address].  scanned into media. Pt moved from       Hx Pulmonary Stenosis s/p Valvuloplasty  07/14/2020     Overview Note:     Dx at 2 and underwent a balloon valvuloplasty at age 1 years, performed by Aureliano Valladares at [facility]  In [address] [place] [address].    Most recent visit to peds cardiology scanned into [place]         Will update [address],   Ob/Gyn Resident  12/21/2020, 1:11 PM

## 2020-12-21 NOTE — ANESTHESIA PRE PROCEDURE
Department of Anesthesiology  Preprocedure Note       Name:  Rich De Jesus   Age:  30 y.o.  :  1995                                          MRN:  [id number]         Date:  2020      Surgeon: Morenita Jimenez):  Irene East DO    Procedure: Procedure(s):   SECTION    Medications prior to admission:   Prior to Admission medications    Medication Sig Start Date End Date Taking? Authorizing Provider   aspirin 81 MG EC tablet Take 81 mg by mouth daily 9/15/20  Yes Historical Provider, MD   Misc. Devices (BREAST PUMP) MIS Double electric breast pump 20  Yes JP Avila - CNP   Prenatal Vit-Fe Fumarate-FA (PRENATAL PO) Take by mouth   Yes Historical Provider, MD       Current medications:    Current Facility-Administered Medications   Medication Dose Route Frequency Provider Last Rate Last Admin    docusate sodium (COLACE) capsule 100 mg  100 mg Oral Daily PRN Speedy Merck, DO        heparin (porcine) injection 5,000 Units  5,000 Units Subcutaneous Q12H Progga Julian, DO   5,000 Units at 20 0935    aspirin chewable tablet 81 mg  81 mg Oral Daily Ana Smiles, DO   81 mg at 20 0935    sodium chloride flush 0.9 % injection 10 mL  10 mL Intravenous BID Yoli Edwin, DO   10 mL at 20 1776    acetaminophen (TYLENOL) tablet 1,000 mg  1,000 mg Oral Q6H PRN Ana Smiles, DO        diphenhydrAMINE (BENADRYL) tablet 25 mg  25 mg Oral Q4H PRN Ana Smiles, DO        ondansetron [facility]) injection 4 mg  4 mg Intravenous Q6H PRN Ana Smiles, DO        sodium chloride flush 0.9 % injection 10 mL  10 mL Intravenous BID Marino Sermons, DO   10 mL at 20 2137       Allergies:  No Known Allergies    Problem List:    Patient Active Problem List   Diagnosis Code    Rh neg/RNI/GBS unk O09.91    Hx Pulmonary Stenosis s/p Valvuloplasty  Z86.79    Vasa previa O69. 4XX0    Marginal cord insertion O43.199    Lagging AC  O09.93    BMI 44.80 O99.210 Vascular: negative vascular ROS. Anesthesia Plan      ASA 3     (Hx of PS with balloon valvuloplasty at age 1. She has mild residual PS and mild PI per recent cardiology note in media section. - we recommend her  section be performed in the main operating suite with cardiac anesthesia. - Discussed with Dr. Pascale Duarte)    arterial line    Anesthetic plan and risks discussed with patient.       Plan discussed with surgical team.              Vonda Kirkpatrick MD   2020

## 2020-12-22 PROCEDURE — 6370000000 HC RX 637 (ALT 250 FOR IP): Performed by: STUDENT IN AN ORGANIZED HEALTH CARE EDUCATION/TRAINING PROGRAM

## 2020-12-22 PROCEDURE — 2580000003 HC RX 258: Performed by: STUDENT IN AN ORGANIZED HEALTH CARE EDUCATION/TRAINING PROGRAM

## 2020-12-22 PROCEDURE — 6360000002 HC RX W HCPCS: Performed by: STUDENT IN AN ORGANIZED HEALTH CARE EDUCATION/TRAINING PROGRAM

## 2020-12-22 PROCEDURE — 96372 THER/PROPH/DIAG INJ SC/IM: CPT

## 2020-12-22 PROCEDURE — 1220000000 HC SEMI PRIVATE OB R&B

## 2020-12-22 RX ADMIN — SODIUM CHLORIDE, PRESERVATIVE FREE 10 ML: 5 INJECTION INTRAVENOUS at 21:09

## 2020-12-22 RX ADMIN — HEPARIN SODIUM 5000 UNITS: 5000 INJECTION INTRAVENOUS; SUBCUTANEOUS at 09:10

## 2020-12-22 RX ADMIN — SODIUM CHLORIDE, PRESERVATIVE FREE 10 ML: 5 INJECTION INTRAVENOUS at 09:11

## 2020-12-22 RX ADMIN — HEPARIN SODIUM 5000 UNITS: 5000 INJECTION INTRAVENOUS; SUBCUTANEOUS at 21:09

## 2020-12-22 RX ADMIN — ASPIRIN 81 MG: 81 TABLET ORAL at 09:10

## 2020-12-22 NOTE — PROGRESS NOTES
OB/GYN PROGRESS NOTE    Jayden Arredondo is a 30 y.o. female  at [address] Day: 22    Subjective:   Patient has been seen and examined. Patient is doing well, complaining of nothing. She is having good regular bowel movements. Patient denies any vaginal discharge and any urinary complaints. The patient reports fetal movement is present, denies contractions, denies loss of fluid, denies vaginal bleeding. Patient denies headache, vision changes, nausea, vomiting, fever, chills, shortness of breath, chest pain, RUQ pain, abdominal pain, diarrhea, change in color/amount/odor of vaginal discharge, dysuria or, hematuria.      Objective:   Vitals:  Vitals:    20 0802 20 0812 20 0841   BP: 121/61 133/68 126/78    Pulse: 88 100 101    Resp:     Temp: 98.1 °F (36.7 °C) 98.6 °F (37 °C) 98.2 °F (36.8 °C)    TempSrc:  Oral Oral    Weight:    256 lb 6.3 oz (116.3 kg)   Height:             FHT: 135, moderate variability, accelerations present, intermittent variable decelerations but overall Cat I FHT  Contractions: none    Physical Exam:  General appearance:  no apparent distress, alert and cooperative  HEENT: head atraumatic, normocephalic, moist mucous membranes, trachea midline  Neurologic:  alert, oriented, normal speech, no focal findings or movement disorder noted  Lungs:  No increased work of breathing, good air exchange, clear to auscultation bilaterally, no crackles or wheezing  Heart:  Normal apical impulse, regular rate and rhythm, normal S1 and S2, no S3 or S4, and no murmur noted    Abdomen:  soft, gravid, non-tender, no rebound, guarding, or rigidity, no RUQ or epigastric tenderness, no signs or symptoms of abruption and no signs or symptoms of chorioamnionitis  Extremities:  no calf tenderness, non edematous  Musculoskeletal: Gross strength equal and intact throughout, no gross abnormalities, range of motion normal in hips, knees, shoulders and spine  Psychiatric: Mood appropriate, normal affect   Rectal Exam: not indicated  Pelvic Exam: not indicated    DATA:  Labs:   Lab Results   Component Value Date    WBC 13.0 (H) 2020    HGB 10.7 (L) 2020    HCT 33.7 (L) 2020    MCV 90.6 2020     2020     Diagnostics:             Assessment/Plan:  Bekah Arredondo is a 30 y.o. female  at 35w2d IUP with Vasa Previa   - Rh negative/ Rubella non-immune/ GBS negative   - Will need MMR postpartum   - No indication for GBS prophylaxis    - Rhogam: given 20   - Rhogam workup needed postpartun   - Influenza vaccination: given 20   - Tdap vaccination: given 20   - CBC, T&S q3 days (next on 20)   - CEFM/TOCO    - MFM scan on  schedule with last being 20 as seen above as patient is scheduled for delivery 20   - R midline in place (placed 20)   - Continue PNV, SCDs, ASA daily   - DVT prophylaxis: SCDs and Heparin 5000U BID (started on 20), s/p lovenox 40 mg qd   -  section scheduled in the Main OR on 20 at 35w1d GA   - VSS   - Overall Cat I FHT, TOCO none   - Patient doing well without complaints   - CS consent completed and in the chart   - NICU consult completed 12/3/20   - Anesthesia consulted and recommending to complete CS in the main OR    S/p celestone 20, , ,     gHTN (new dx)   - Pt denies any s/s PreE    - PreE labs WNL x2, P/C 0.12 ()   - BPs normotensive    Hx pulmonary stenosis s/p valvuloplasty   - Last Echo and visit with Peds Cardiology 20 (noted in media): mild to moderate pulmonary insufficiency stable from 2016   - Pt denies any CP, SOB   - Anesthesia consulted and recommending to complete CS in the main OR    Marginal cord insertion   - Noted on last MFM ultrasound    Anterior placental accessory lobe   - Noted on last MFM ultrasound    Fetal Lagging AC (<3rd%)   - NIPT wnl   - MFM scan on , last on 20 and scheduled for delivery °C)   TempSrc: Oral Oral  Oral   Weight:   256 lb 6.3 oz (116.3 kg)    Height:         No results found for this or any previous visit (from the past 24 hour(s)).       @ 34w2d              - MFM q weekly              - Heparin BID              - Vitals per protocol              - Intermittent fetal monitoring is fine if tracing reassuring.   Rhogam given   Vasa Previa              - C/S scheduled              - MFM ultrasound weekly              - Rescue steroids scheduled  gHTN              - normotensive              - no s/s of preeclampsia  Hx of congenital pulmonary stenosis              - ECHO has been stable              - Fetal Echo normal     TRACIE Neri, DO

## 2020-12-23 LAB
ABO/RH: NORMAL
ABSOLUTE EOS #: 0.17 K/UL (ref 0–0.44)
ABSOLUTE IMMATURE GRANULOCYTE: 0.46 K/UL (ref 0–0.3)
ABSOLUTE LYMPH #: 3.01 K/UL (ref 1.1–3.7)
ABSOLUTE MONO #: 0.86 K/UL (ref 0.1–1.2)
ANTIBODY IDENTIFICATION: NORMAL
ANTIBODY SCREEN: NORMAL
ARM BAND NUMBER: NORMAL
BASOPHILS # BLD: 0 % (ref 0–2)
BASOPHILS ABSOLUTE: 0.05 K/UL (ref 0–0.2)
BLOOD BANK SPECIMEN: NORMAL
DIFFERENTIAL TYPE: ABNORMAL
EOSINOPHILS RELATIVE PERCENT: 2 % (ref 1–4)
EXPIRATION DATE: NORMAL
HCT VFR BLD CALC: 32.6 % (ref 36.3–47.1)
HEMOGLOBIN: 10.6 G/DL (ref 11.9–15.1)
IMMATURE GRANULOCYTES: 4 %
LYMPHOCYTES # BLD: 26 % (ref 24–43)
MCH RBC QN AUTO: 29 PG (ref 25.2–33.5)
MCHC RBC AUTO-ENTMCNC: 32.5 G/DL (ref 28.4–34.8)
MCV RBC AUTO: 89.1 FL (ref 82.6–102.9)
MONOCYTES # BLD: 8 % (ref 3–12)
NRBC AUTOMATED: 0 PER 100 WBC
PDW BLD-RTO: 12.8 % (ref 11.8–14.4)
PLATELET # BLD: 243 K/UL (ref 138–453)
PLATELET ESTIMATE: ABNORMAL
PMV BLD AUTO: 9.8 FL (ref 8.1–13.5)
RBC # BLD: 3.66 M/UL (ref 3.95–5.11)
RBC # BLD: ABNORMAL 10*6/UL
SEG NEUTROPHILS: 60 % (ref 36–65)
SEGMENTED NEUTROPHILS ABSOLUTE COUNT: 6.9 K/UL (ref 1.5–8.1)
WBC # BLD: 11.5 K/UL (ref 3.5–11.3)
WBC # BLD: ABNORMAL 10*3/UL

## 2020-12-23 PROCEDURE — 96372 THER/PROPH/DIAG INJ SC/IM: CPT

## 2020-12-23 PROCEDURE — 6360000002 HC RX W HCPCS: Performed by: STUDENT IN AN ORGANIZED HEALTH CARE EDUCATION/TRAINING PROGRAM

## 2020-12-23 PROCEDURE — 86901 BLOOD TYPING SEROLOGIC RH(D): CPT

## 2020-12-23 PROCEDURE — 86870 RBC ANTIBODY IDENTIFICATION: CPT

## 2020-12-23 PROCEDURE — 86850 RBC ANTIBODY SCREEN: CPT

## 2020-12-23 PROCEDURE — 36415 COLL VENOUS BLD VENIPUNCTURE: CPT

## 2020-12-23 PROCEDURE — 86900 BLOOD TYPING SEROLOGIC ABO: CPT

## 2020-12-23 PROCEDURE — 6370000000 HC RX 637 (ALT 250 FOR IP): Performed by: STUDENT IN AN ORGANIZED HEALTH CARE EDUCATION/TRAINING PROGRAM

## 2020-12-23 PROCEDURE — 1220000000 HC SEMI PRIVATE OB R&B

## 2020-12-23 PROCEDURE — 2580000003 HC RX 258: Performed by: STUDENT IN AN ORGANIZED HEALTH CARE EDUCATION/TRAINING PROGRAM

## 2020-12-23 PROCEDURE — 85025 COMPLETE CBC W/AUTO DIFF WBC: CPT

## 2020-12-23 RX ADMIN — SODIUM CHLORIDE, PRESERVATIVE FREE 10 ML: 5 INJECTION INTRAVENOUS at 08:04

## 2020-12-23 RX ADMIN — ASPIRIN 81 MG: 81 TABLET ORAL at 08:03

## 2020-12-23 RX ADMIN — SODIUM CHLORIDE, PRESERVATIVE FREE 10 ML: 5 INJECTION INTRAVENOUS at 20:51

## 2020-12-23 RX ADMIN — HEPARIN SODIUM 5000 UNITS: 5000 INJECTION INTRAVENOUS; SUBCUTANEOUS at 20:44

## 2020-12-23 RX ADMIN — SODIUM CHLORIDE, PRESERVATIVE FREE 10 ML: 5 INJECTION INTRAVENOUS at 20:46

## 2020-12-23 RX ADMIN — HEPARIN SODIUM 5000 UNITS: 5000 INJECTION INTRAVENOUS; SUBCUTANEOUS at 08:03

## 2020-12-23 NOTE — PROGRESS NOTES
OB/GYN PROGRESS NOTE    Jun Arredondo is a 30 y.o. female  at [facility]. 31 Day: 21    Subjective:   Patient has been seen and examined. Patient is resting comofrtably, complaining of nothing at this time. Patient denies any vaginal discharge and any urinary complaints. The patient reports fetal movement is present, denies contractions, denies loss of fluid, denies vaginal bleeding. Patient denies headache, vision changes, nausea, vomiting, fever, chills, shortness of breath, chest pain, RUQ pain, abdominal pain, diarrhea, change in color/amount/odor of vaginal discharge, dysuria or, hematuria. Objective:   Vitals:  Vitals:    20 1205 20 1604 20 2109 20 0800   BP: (!) 147/72 122/67 (!) 117/53 117/66   Pulse: 112 90 87 89   Resp:    Temp: 98.1 °F (36.7 °C) 98.4 °F (36.9 °C) 98.1 °F (36.7 °C) 97.9 °F (36.6 °C)   TempSrc: Oral Oral Oral Oral   Weight:       Height:            FHT: 135, moderate variability, accelerations present, decelerations absent  Contractions: none    Physical Exam:  General appearance:  no apparent distress, alert and cooperative  HEENT: head atraumatic, normocephalic, moist mucous membranes, trachea midline  Neurologic:  alert, oriented, normal speech, no focal findings or movement disorder noted  Lungs:  No increased work of breathing, good air exchange, clear to auscultation bilaterally, no crackles or wheezing  Heart:  regular rate and rhythm    Abdomen:  soft, gravid and non-tender  Extremities:  no calf tenderness  Musculoskeletal: Gross strength equal and intact throughout, no gross abnormalities, range of motion normal in hips, knees, shoulders and spine, CVA tenderness: none  Psychiatric: Mood appropriate, normal affect   Rectal Exam: not indicated    Diagnostics:     ECHO 20: Mild to moderate pulmonary insufficiency stable from 2016     [facility] scan : Succenturiate lobe, marginal cord and vasa previa all visualized again today.  BPP  KELECHI 12.11 cm    Assessment/Plan:  Warren Luna Minor is a 30 y.o. female  at 34w3d IUP   - Rh neg/ Rubella NI/ GBS neg   - No indication  for GBS prophylaxis    - Rhogam: 20; Rhogam hdz PP   - Influenza vaccination: 20   - Tdap vaccination: 20   - MMR Postpartum    - Continue PNV, SCDs daily   - NIPT neg   - COVID neg 20    Inpatient management of Vasa Previa    - VSS, Afebrile   - cEFM/TOCO: Cat 1    - CBC and T&S q3d next on 20   - General diet   - Heparin 5000 units BID for DVT prophylaxis   - Midline in place 20   - C/S consent 20   - NICU consult completed 12/3/20   - MFM scans on     - S/p Celestone , , ,    - C/S scheduled for 20    gHTN (new dx)     - BP currently normotensive   - Denies s/s of PreE   - Continue ASA   - PreE labs wnl x2, P/C 0.10 ().  P/C 0.12    Hx Pulmonary Stenosis s/p Valvuloplasty    - Patient has a history of congential pulmonary stenosis s/p balloon valvuloplasty at age 1   - ECHO 20: Mild to moderate pulmonary insufficiency stable from 2016    - Cardiology recommended follow up in 3 years   - Fetal ECHO wnl   - Will consult Anesthesia     Marginal Cord Insertion    - Continue following with MFM      Anterior Accessory Lobe    - Continue following with MFM      Lagging AC (<3rd percentile)    - NIPT neg      Anemia (Hgb 11.3>>10.6)    - CBC on    - Patient remains asymptomatic    - Will plan to to follow up with injectofer as outpatient per provider       BMI 44.80     Patient Active Problem List    Diagnosis Date Noted    Celestone 20, 2020     Priority: High     screening for fetal growth retardation using ultrasonics     33 weeks gestation of pregnancy     Placenta marginalis in third trimester     Placenta succenturiata in third trimester     Poor fetal growth complicating pregnancy, antepartum     Gestational hypertension (G1) 2020    31 weeks gestation of pregnancy 12/01/2020    Vasa previa 11/29/2020     Overview Note:     Plan for inpatient management at 32 weeks.  Marginal cord insertion 11/29/2020    Lagging AC  11/29/2020    BMI 44.80 11/29/2020    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 Pt desires care with different 2018 [facility] records/ultrasound/labs  from previous provider from [address] [place] [address].  scanned into media. Pt moved from       Hx Pulmonary Stenosis s/p Valvuloplasty  07/14/2020     Overview Note:     Dx at 2 and underwent a balloon valvuloplasty at age 1 years, performed by Angel Quesada at [facility]  In [address] [place] [address].    Most recent visit to peds cardiology scanned into [employer]         Will update [address], DO  Ob/Gyn Resident  12/23/2020, 10:34 AM

## 2020-12-24 PROCEDURE — 6370000000 HC RX 637 (ALT 250 FOR IP): Performed by: STUDENT IN AN ORGANIZED HEALTH CARE EDUCATION/TRAINING PROGRAM

## 2020-12-24 PROCEDURE — 2580000003 HC RX 258: Performed by: STUDENT IN AN ORGANIZED HEALTH CARE EDUCATION/TRAINING PROGRAM

## 2020-12-24 PROCEDURE — 96372 THER/PROPH/DIAG INJ SC/IM: CPT

## 2020-12-24 PROCEDURE — 1220000000 HC SEMI PRIVATE OB R&B

## 2020-12-24 PROCEDURE — 6360000002 HC RX W HCPCS: Performed by: STUDENT IN AN ORGANIZED HEALTH CARE EDUCATION/TRAINING PROGRAM

## 2020-12-24 RX ADMIN — SODIUM CHLORIDE, PRESERVATIVE FREE 10 ML: 5 INJECTION INTRAVENOUS at 20:59

## 2020-12-24 RX ADMIN — SODIUM CHLORIDE, PRESERVATIVE FREE 10 ML: 5 INJECTION INTRAVENOUS at 09:13

## 2020-12-24 RX ADMIN — HEPARIN SODIUM 5000 UNITS: 5000 INJECTION INTRAVENOUS; SUBCUTANEOUS at 09:13

## 2020-12-24 RX ADMIN — HEPARIN SODIUM 5000 UNITS: 5000 INJECTION INTRAVENOUS; SUBCUTANEOUS at 20:52

## 2020-12-24 RX ADMIN — ASPIRIN 81 MG: 81 TABLET ORAL at 09:13

## 2020-12-24 NOTE — FLOWSHEET NOTE
Pt sitting up in [employer], eating breakfast, not tracing on EFM at this time. Writer to readjust when she is finished eating.

## 2020-12-24 NOTE — CARE COORDINATION
TRANSITIONAL CARE PLANNING/ [facility] Day: 24 (34 4/7 weeks pregnant)    Reason for Admission: 31 weeks gestation of pregnancy [Z3A.31]     MFM scan 12/21: Succenturiate lobe, marginal cord and vasa previa all visualized again today. BPP 8/8 KELECHI 12.11 cm     FHT: 135, moderate variability, accelerations present, decelerations absent  Contractions: none    Treatment Plan of Care:   VS per labor and delivery protocol  cEFM and TOCO (Currently Category 1)  PNV, SCDs daily  CBC and T&S q3d next on 12/26/20  General diet  Heparin 5000 units BID for DVT prophylaxis  MFM scans on Mondays   Maintain midline  S/p Celestone 12/1, 12/2 and 12/19, 12/20  Continue ASA  CS consent on chart for 12/28/2020    Tests/Procedures still needed: MMR postpartum, Rhogam workup PP,     Barriers to Discharge: Vasa Previa. Will remain IP until planned delivery on 12/28. CS recommended in the main OR per anesthesiology. Readmission Risk: 9           Patient goals/Treatment Preferences/Transitional Plan: anticipate routine DC after delivery. Referrals Made: NICU, MFM, Cardiology    Follow Up needed: After delivery:    PP follow up as per OB recommendations (CS, likely 1 week)   Cardiology follow up in June 2023 per recommendation   Peds follow up for baby.

## 2020-12-24 NOTE — PROGRESS NOTES
OB/GYN PROGRESS NOTE    Abi Arredondo is a 30 y.o. female  at [address] Day: 24    Subjective:   Patient has been seen and examined. Patient is sitting up, complaining of nothing. Patient denies any vaginal discharge and any urinary complaints. The patient reports fetal movement is present, denies contractions, denies loss of fluid, denies vaginal bleeding. Patient denies headache, vision changes, nausea, vomiting, fever, chills, shortness of breath, chest pain, RUQ pain, abdominal pain, diarrhea, change in color/amount/odor of vaginal discharge, dysuria or, hematuria.      Objective:   Vitals:  Vitals:    20 0800 20 1230 20 1600 20 2050   BP: 117/66 132/74 135/62 127/69   Pulse: 89 94 95 97   Resp: 18 18    Temp: 97.9 °F (36.6 °C) 98.3 °F (36.8 °C) 98.3 °F (36.8 °C) 98.1 °F (36.7 °C)   TempSrc: Oral Oral Oral Oral   Weight:       Height:         FHT: 135 moderate variability, accelerations present, decelerations absent  Contractions: none    Physical Exam:  General appearance:  no apparent distress, alert and cooperative  HEENT: head atraumatic, normocephalic, moist mucous membranes, trachea midline  Neurologic:  alert, oriented, normal speech, no focal findings or movement disorder noted  Lungs:  No increased work of breathing, good air exchange, clear to auscultation bilaterally, no crackles or wheezing  Heart:  regular rate and rhythm and no murmur    Abdomen:  soft, gravid, non-tender, no rebound, guarding, or rigidity, no RUQ or epigastric tenderness, no signs or symptoms of abruption and no signs or symptoms of chorioamnionitis  Extremities:  no calf tenderness, non edematous, DTR's: +2/4 bilateral extremities   Musculoskeletal: Gross strength equal and intact throughout, no gross abnormalities, range of motion normal in hips, knees, shoulders and spine, CVA tenderness: none  Psychiatric: Mood appropriate, normal affect   Rectal Exam: not indicated  Pelvic Exam: not indicated      DATA:  Labs:   Reviewed every 3 days, stable    Diagnostics:   ECHO 20: Mild to moderate pulmonary insufficiency stable from 2016  MFM scan : Succenturiate lobe, marginal cord and vasa previa all visualized again today. Lagging A/C. KELECHI 11.67 cm, EFW 3#10  MFM scan : Succenturiate lobe, marginal cord and vasa previa all visualized again today. BPP  KELECHI 12.11 cm    Assessment/Plan:  Maury Arredondo is a 30 y.o. female  at 34w4d IUP   - Rh neg/ Rubella non immune/ GBS neg   - No indication for GBS prophylaxis    - Rhogam: done on 20, rhogam hdz PP   - Influenza vaccination: 20   - Tdap vaccination: done on 20   - MMR PP   - Continue PNV, SCDs daily   - NIPT neg   - COVID negative 20   - VSS.  Afebrile   - Cat 1 FHT, TOCO none    Inpatient Management of Vasa Previa   - VSS, Afebrile   - No new complaints or change in clinical status   - cEFM/TOCO: Cat 1   - CBC and T&S q3days, next on 20   - General diet   - Heparin 5000U BID for DVT prophylaxis   - Midline in place on 20   - C/S consent on 20   - NICU consult completed on 12/3/20   - MFM Scans on    - S/p celestone , , ,    - C/S scheduled for 20   - Continue to monitor closely     gHTN   - Newly diagnosed   - BPs currently normotensive   - Denies s/s of preE   - Continue ASA   - PreE labs wnl x2, P/C 0.10 on , P/C 0.12    Hx Pulmonary Stenosis s/p Valvuloplasty    - Patient has a history of congential pulmonary stenosis s/p balloon valvuloplasty at age 1   - ECHO 20: Mild to moderate pulmonary insufficiency stable from 2016   - Cardiology recommended follow up in 3 years    - Fetal echo wnl   - Anesthesia consulted , appreciate recs, plan for delivery in main OR    Marginal Cord Insertion   - Continue following with MFM    Anterior Accessory Lobe    - Continue following with MFM    Lagging AC (<3rd percentile)   - NIPT neg    Anemia   - Hgb 11.3>10.6   - Next CBC on    - Patient asymptomatic    - Follow up with injectofer as outpatient    BMI 44    Patient Active Problem List    Diagnosis Date Noted    Celestone 20, 2020     Priority: High     screening for fetal growth retardation using ultrasonics     33 weeks gestation of pregnancy     Placenta marginalis in third trimester     Placenta succenturiata in third trimester     Poor fetal growth complicating pregnancy, antepartum     Gestational hypertension (G1) 2020    31 weeks gestation of pregnancy 2020    Vasa previa 2020     Overview Note:     Plan for inpatient management at 32 weeks.  Marginal cord insertion 2020    Lagging AC  2020    BMI 44.80 2020    Rh neg/RNI/GBS unk 2020     Overview Note:     Intake not completed. Pt desires care with different 2018 [facility] records/ultrasound/labs  from previous provider from [address] [place] [address].  scanned into media. Pt moved from        Pulmonary Stenosis s/p Valvuloplasty  2020     Overview Note:     Dx at 2 and underwent a balloon valvuloplasty at age 1 years, performed by Luma Penaloza at [facility]  In [address] [place] [address].    Most recent visit to peds cardiology scanned into [facility] Cy Tj Haley DO  Ob/Gyn Resident  2020, 11:38 AM

## 2020-12-25 PROCEDURE — 96372 THER/PROPH/DIAG INJ SC/IM: CPT

## 2020-12-25 PROCEDURE — 6360000002 HC RX W HCPCS: Performed by: STUDENT IN AN ORGANIZED HEALTH CARE EDUCATION/TRAINING PROGRAM

## 2020-12-25 PROCEDURE — 2580000003 HC RX 258: Performed by: STUDENT IN AN ORGANIZED HEALTH CARE EDUCATION/TRAINING PROGRAM

## 2020-12-25 PROCEDURE — 6370000000 HC RX 637 (ALT 250 FOR IP): Performed by: STUDENT IN AN ORGANIZED HEALTH CARE EDUCATION/TRAINING PROGRAM

## 2020-12-25 PROCEDURE — 1220000000 HC SEMI PRIVATE OB R&B

## 2020-12-25 RX ADMIN — SODIUM CHLORIDE, PRESERVATIVE FREE 10 ML: 5 INJECTION INTRAVENOUS at 21:05

## 2020-12-25 RX ADMIN — ASPIRIN 81 MG: 81 TABLET ORAL at 09:28

## 2020-12-25 RX ADMIN — HEPARIN SODIUM 5000 UNITS: 5000 INJECTION INTRAVENOUS; SUBCUTANEOUS at 21:03

## 2020-12-25 RX ADMIN — SODIUM CHLORIDE, PRESERVATIVE FREE 10 ML: 5 INJECTION INTRAVENOUS at 09:28

## 2020-12-25 RX ADMIN — HEPARIN SODIUM 5000 UNITS: 5000 INJECTION INTRAVENOUS; SUBCUTANEOUS at 09:28

## 2020-12-25 NOTE — PROGRESS NOTES
OB/GYN PROGRESS NOTE    Rissa Arredondo is a 30 y.o. female  at [address] [facility] Day: 25    Subjective:   Patient has been seen and examined. Patient is doing well, complaining of nothing. She is having good regular bowel movements. Patient denies any vaginal discharge and any urinary complaints. The patient reports fetal movement is present, denies contractions, denies loss of fluid, denies vaginal bleeding. Patient denies headache, vision changes, nausea, vomiting, fever, chills, shortness of breath, chest pain, RUQ pain, abdominal pain, diarrhea, change in color/amount/odor of vaginal discharge, dysuria or, hematuria.      Objective:   Vitals:  Vitals:    20 1527 20 2050 20 2100 20 0812   BP: 130/60 120/84  139/71   Pulse: 88 103  88   Resp: 18  18 16   Temp: 98.3 °F (36.8 °C)  98.2 °F (36.8 °C) 98 °F (36.7 °C)   TempSrc: Oral  Oral Oral   Weight:       Height:             FHT: 135, moderate variability, accelerations present, no decelerations  Contractions: none    Physical Exam:  General appearance:  no apparent distress, alert and cooperative  HEENT: head atraumatic, normocephalic, moist mucous membranes, trachea midline  Neurologic:  alert, oriented, normal speech, no focal findings or movement disorder noted  Lungs:  No increased work of breathing, good air exchange, clear to auscultation bilaterally, no crackles or wheezing  Heart:  Normal apical impulse, regular rate and rhythm, normal S1 and S2, no S3 or S4, and no murmur noted    Abdomen:  soft, gravid, non-tender, no rebound, guarding, or rigidity, no RUQ or epigastric tenderness, no signs or symptoms of abruption and no signs or symptoms of chorioamnionitis  Extremities:  no calf tenderness, non edematous  Musculoskeletal: Gross strength equal and intact throughout, no gross abnormalities, range of motion normal in hips, knees, shoulders and spine  Psychiatric: Mood appropriate, normal affect   Rectal Exam: not indicated Pelvic Exam: not indicated    DATA:  Labs:     Lab Results   Component Value Date    WBC 11.5 (H) 2020    HGB 10.6 (L) 2020    HCT 32.6 (L) 2020    MCV 89.1 2020     2020     Diagnostics:             Assessment/Plan:  Deneen Arredondo is a 30 y.o. female  at 35w7d IUP with Vasa Previa   - Rh negative/ Rubella non-immune/ GBS negative   - Will need MMR postpartum and Rhogam w/u PP   - No indication for GBS prophylaxis    - Rhogam: given 20   - Influenza vaccination: given 20   - Tdap vaccination: given 20   - CBC, T&S q3 days (next on 20)   - CEFM/TOCO    - MFM scan on  schedule with last being 20 as seen above as patient is scheduled for delivery 20   - R midline in place (placed 20)   - Continue PNV, SCDs, ASA daily   - DVT prophylaxis: SCDs and Heparin 5000U BID (started on 20), s/p lovenox 40 mg qd   -  section scheduled in the Main OR on 20 at 35w1d GA   - VSS   - Cat I FHT, TOCO none   - Patient doing well without complaints   - CS consent completed and in the chart   - NICU consult completed 12/3/20   - Anesthesia consulted and recommending to complete CS in the main OR    S/p celestone 20, , ,     gHTN (new dx)   - Pt denies any s/s PreE    - PreE labs WNL x2, P/C 0.12 ()   - BPs normotensive with rarely 098K systolic BP    Hx pulmonary stenosis s/p valvuloplasty   - Last Echo and visit with Peds Cardiology 20 (noted in media): mild to moderate pulmonary insufficiency stable from 2016   - Pt denies any CP, SOB   - Anesthesia consulted and recommending to complete CS in the main OR    Marginal cord insertion   - Noted on last MFM ultrasound    Anterior placental accessory lobe   - Noted on last MFM ultrasound     Fetal Lagging AC (<3rd%)   - NIPT wnl   - MFM scan on , last on 20 and scheduled for delivery 20     Anemia (Hgb 11.0>10.4>>>10.6)   - Pt denies any s/s anemia   - VSS    BMI 44.80      Patient Active Problem List    Diagnosis Date Noted    Celestone 20, 2020     Priority: High     screening for fetal growth retardation using ultrasonics     33 weeks gestation of pregnancy     Placenta marginalis in third trimester     Placenta succenturiata in third trimester     Poor fetal growth complicating pregnancy, antepartum     Gestational hypertension (G1) 2020    31 weeks gestation of pregnancy 2020    Vasa previa 2020     Overview Note:     Plan for inpatient management at 32 weeks.  Marginal cord insertion 2020    Lagging AC  2020    BMI 44.80 2020    Rh neg/RNI/GBS unk 2020     Overview Note:     Intake not completed. Pt desires care with different [address] [place] [address] records/ultrasound/labs  from previous provider from [address] [place] [address].  scanned into media. Pt moved from       Hx Pulmonary Stenosis s/p Valvuloplasty  2020     Overview Note:     Dx at 2 and underwent a balloon valvuloplasty at age 1 years, performed by Tiesha Lopez at [facility]  In [address] [place] [address]. Most recent visit to peds cardiology scanned into [id number]         Will update Dr. Phuong Treviño.      Joe Thayer DO  Ob/Gyn Resident  2020, 10:01 AM

## 2020-12-26 LAB
ABO/RH: NORMAL
ANTIBODY IDENTIFICATION: NORMAL
ANTIBODY SCREEN: POSITIVE
ARM BAND NUMBER: NORMAL
BLOOD BANK SPECIMEN: NORMAL
EXPIRATION DATE: NORMAL

## 2020-12-26 PROCEDURE — 1220000000 HC SEMI PRIVATE OB R&B

## 2020-12-26 PROCEDURE — 86901 BLOOD TYPING SEROLOGIC RH(D): CPT

## 2020-12-26 PROCEDURE — 6360000002 HC RX W HCPCS: Performed by: STUDENT IN AN ORGANIZED HEALTH CARE EDUCATION/TRAINING PROGRAM

## 2020-12-26 PROCEDURE — 2580000003 HC RX 258: Performed by: STUDENT IN AN ORGANIZED HEALTH CARE EDUCATION/TRAINING PROGRAM

## 2020-12-26 PROCEDURE — 36415 COLL VENOUS BLD VENIPUNCTURE: CPT

## 2020-12-26 PROCEDURE — 6370000000 HC RX 637 (ALT 250 FOR IP): Performed by: STUDENT IN AN ORGANIZED HEALTH CARE EDUCATION/TRAINING PROGRAM

## 2020-12-26 PROCEDURE — 86870 RBC ANTIBODY IDENTIFICATION: CPT

## 2020-12-26 PROCEDURE — 86900 BLOOD TYPING SEROLOGIC ABO: CPT

## 2020-12-26 PROCEDURE — 86850 RBC ANTIBODY SCREEN: CPT

## 2020-12-26 PROCEDURE — 96372 THER/PROPH/DIAG INJ SC/IM: CPT

## 2020-12-26 RX ADMIN — SODIUM CHLORIDE, PRESERVATIVE FREE 10 ML: 5 INJECTION INTRAVENOUS at 21:02

## 2020-12-26 RX ADMIN — HEPARIN SODIUM 5000 UNITS: 5000 INJECTION INTRAVENOUS; SUBCUTANEOUS at 21:03

## 2020-12-26 RX ADMIN — SODIUM CHLORIDE, PRESERVATIVE FREE 10 ML: 5 INJECTION INTRAVENOUS at 09:38

## 2020-12-26 RX ADMIN — HEPARIN SODIUM 5000 UNITS: 5000 INJECTION INTRAVENOUS; SUBCUTANEOUS at 09:39

## 2020-12-26 RX ADMIN — ASPIRIN 81 MG: 81 TABLET ORAL at 09:39

## 2020-12-26 NOTE — PROGRESS NOTES
OB/GYN PROGRESS NOTE    West Arredondo is a 30 y.o. female  at [address] [facility] Day: 32    Subjective:   Patient has been seen and examined. Patient is sitting up, complaining of nothing. Patient denies any vaginal discharge and any urinary complaints. The patient reports fetal movement is present, denies contractions, denies loss of fluid, denies vaginal bleeding. Patient denies headache, vision changes, nausea, vomiting, fever, chills, shortness of breath, chest pain, RUQ pain, abdominal pain, diarrhea, change in color/amount/odor of vaginal discharge, dysuria or, hematuria.      Objective:   Vitals:  Vitals:    200 20 2100 20 0812 20   BP: 120/84  139/71 (!) 146/64   Pulse: 103  88 109   Resp:     Temp:  98.2 °F (36.8 °C) 98 °F (36.7 °C) 98.6 °F (37 °C)   TempSrc:  Oral Oral Oral   Weight:       Height:         FHT: 135 moderate variability, accelerations present, decelerations absent  Contractions: none    Physical Exam:  General appearance:  no apparent distress, alert and cooperative  HEENT: head atraumatic, normocephalic, moist mucous membranes, trachea midline  Neurologic:  alert, oriented, normal speech, no focal findings or movement disorder noted  Lungs:  No increased work of breathing, good air exchange, clear to auscultation bilaterally, no crackles or wheezing  Heart:  regular rate and rhythm and no murmur    Abdomen:  soft, gravid, non-tender, no rebound, guarding, or rigidity, no RUQ or epigastric tenderness, no signs or symptoms of abruption and no signs or symptoms of chorioamnionitis  Extremities:  no calf tenderness, non edematous, DTR's: +2/4 bilateral extremities   Musculoskeletal: Gross strength equal and intact throughout, no gross abnormalities, range of motion normal in hips, knees, shoulders and spine, CVA tenderness: none  Psychiatric: Mood appropriate, normal affect   Rectal Exam: not indicated  Pelvic Exam: not indicated      DATA:  Labs: Reviewed every 3 days, stable    Diagnostics:   ECHO 20: Mild to moderate pulmonary insufficiency stable from 2016  MFM scan : Succenturiate lobe, marginal cord and vasa previa all visualized again today. Lagging A/C. KELECHI 11.67 cm, EFW 3#10  MFM scan : Succenturiate lobe, marginal cord and vasa previa all visualized again today. BPP 8/8 KELECHI 12.11 cm    Assessment/Plan:  Ioana Arredondo is a 30 y.o. female  at 34w6d IUP   - Rh neg/ Rubella non immune/ GBS neg   - No indication for GBS prophylaxis    - Rhogam: done on 20, rhogam hdz PP   - Influenza vaccination: 20   - Tdap vaccination: done on 20   - MMR PP   - Continue PNV, SCDs daily   - NIPT neg   - COVID negative 20   - VSS.  Afebrile   - Cat 1 FHT, TOCO none    Inpatient Management of Vasa Previa   - VSS, Afebrile   - No new complaints or change in clinical status   - cEFM/TOCO: Cat 1   - CBC and T&S q3days, next on 20, today    - General diet   - Heparin 5000U BID for DVT prophylaxis, will discontinue  PM   - Midline in place on 20   - C/S consent on 20   - NICU consult completed on 12/3/20   - MFM Scans on , last scan on  as patient is scheduled for delivery on    - S/p celestone , , ,    - C/S scheduled for 20 in main OR   - Continue to monitor closely     gHTN   - Newly diagnosed   - BPs stable   - Denies s/s of preE   - Continue ASA   - PreE labs wnl x2, P/C 0.10 on , P/C 0.12    Hx Pulmonary Stenosis s/p Valvuloplasty    - Patient has a history of congential pulmonary stenosis s/p balloon valvuloplasty at age 1   - ECHO 20: Mild to moderate pulmonary insufficiency stable from 2016   - Cardiology recommended follow up in 3 years    - Fetal echo wnl   - Anesthesia consulted , appreciate recs, plan for delivery in main OR    Marginal Cord Insertion   - Continue following with MFM    Anterior Accessory Lobe    - Continue following with MFM Lagging AC (<3rd percentile)   - NIPT neg    Anemia   - Hgb 11.3>10.6   - Next CBC on    - Patient asymptomatic    - Follow up with injectofer as outpatient    BMI 44    Patient Active Problem List    Diagnosis Date Noted    Celestone 20, 2020     Priority: High     screening for fetal growth retardation using ultrasonics     33 weeks gestation of pregnancy     Placenta marginalis in third trimester     Placenta succenturiata in third trimester     Poor fetal growth complicating pregnancy, antepartum     Gestational hypertension (G1) 2020    31 weeks gestation of pregnancy 2020    Vasa previa 2020     Overview Note:     Plan for inpatient management at 32 weeks.  Marginal cord insertion 2020    Lagging AC  2020    BMI 44.80 2020    Rh neg/RNI/GBS unk 2020     Overview Note:     Intake not completed. Pt desires care with different [facility] records/ultrasound/labs  from previous provider from [address] [place] [address].  scanned into media. Pt moved from       Hx Pulmonary Stenosis s/p Valvuloplasty  2020     Overview Note:     Dx at 2 and underwent a balloon valvuloplasty at age 1 years, performed by Fatmata Lu at [facility]  In [address] [place] [address].    Most recent visit to peds cardiology scanned into [facility] Cy Haley DO  Ob/Gyn Resident  2020, 12:46 PM

## 2020-12-27 ENCOUNTER — ANESTHESIA EVENT (OUTPATIENT)
Dept: OPERATING ROOM | Age: 25
End: 2020-12-27
Payer: COMMERCIAL

## 2020-12-27 LAB
ABO/RH: NORMAL
ABSOLUTE EOS #: 0.26 K/UL (ref 0–0.44)
ABSOLUTE IMMATURE GRANULOCYTE: 0.31 K/UL (ref 0–0.3)
ABSOLUTE LYMPH #: 3.32 K/UL (ref 1.1–3.7)
ABSOLUTE MONO #: 0.74 K/UL (ref 0.1–1.2)
ANTIBODY IDENTIFICATION: NORMAL
ANTIBODY SCREEN: NORMAL
ARM BAND NUMBER: NORMAL
BASOPHILS # BLD: 1 % (ref 0–2)
BASOPHILS ABSOLUTE: 0.07 K/UL (ref 0–0.2)
DIFFERENTIAL TYPE: ABNORMAL
EOSINOPHILS RELATIVE PERCENT: 2 % (ref 1–4)
EXPIRATION DATE: NORMAL
HCT VFR BLD CALC: 34 % (ref 36.3–47.1)
HEMOGLOBIN: 11 G/DL (ref 11.9–15.1)
IMMATURE GRANULOCYTES: 3 %
LYMPHOCYTES # BLD: 27 % (ref 24–43)
MCH RBC QN AUTO: 28.4 PG (ref 25.2–33.5)
MCHC RBC AUTO-ENTMCNC: 32.4 G/DL (ref 28.4–34.8)
MCV RBC AUTO: 87.6 FL (ref 82.6–102.9)
MONOCYTES # BLD: 6 % (ref 3–12)
NRBC AUTOMATED: 0 PER 100 WBC
PDW BLD-RTO: 12.8 % (ref 11.8–14.4)
PLATELET # BLD: 255 K/UL (ref 138–453)
PLATELET ESTIMATE: ABNORMAL
PMV BLD AUTO: 9.8 FL (ref 8.1–13.5)
RBC # BLD: 3.88 M/UL (ref 3.95–5.11)
RBC # BLD: ABNORMAL 10*6/UL
SEG NEUTROPHILS: 61 % (ref 36–65)
SEGMENTED NEUTROPHILS ABSOLUTE COUNT: 7.6 K/UL (ref 1.5–8.1)
WBC # BLD: 12.3 K/UL (ref 3.5–11.3)
WBC # BLD: ABNORMAL 10*3/UL

## 2020-12-27 PROCEDURE — 36415 COLL VENOUS BLD VENIPUNCTURE: CPT

## 2020-12-27 PROCEDURE — 6360000002 HC RX W HCPCS: Performed by: STUDENT IN AN ORGANIZED HEALTH CARE EDUCATION/TRAINING PROGRAM

## 2020-12-27 PROCEDURE — 96372 THER/PROPH/DIAG INJ SC/IM: CPT

## 2020-12-27 PROCEDURE — 6370000000 HC RX 637 (ALT 250 FOR IP): Performed by: STUDENT IN AN ORGANIZED HEALTH CARE EDUCATION/TRAINING PROGRAM

## 2020-12-27 PROCEDURE — 2580000003 HC RX 258: Performed by: STUDENT IN AN ORGANIZED HEALTH CARE EDUCATION/TRAINING PROGRAM

## 2020-12-27 PROCEDURE — 1220000000 HC SEMI PRIVATE OB R&B

## 2020-12-27 PROCEDURE — 85025 COMPLETE CBC W/AUTO DIFF WBC: CPT

## 2020-12-27 RX ORDER — TRISODIUM CITRATE DIHYDRATE AND CITRIC ACID MONOHYDRATE 500; 334 MG/5ML; MG/5ML
30 SOLUTION ORAL ONCE
Status: COMPLETED | OUTPATIENT
Start: 2020-12-28 | End: 2020-12-28

## 2020-12-27 RX ADMIN — HEPARIN SODIUM 5000 UNITS: 5000 INJECTION INTRAVENOUS; SUBCUTANEOUS at 07:59

## 2020-12-27 RX ADMIN — ASPIRIN 81 MG: 81 TABLET ORAL at 08:01

## 2020-12-27 RX ADMIN — SODIUM CHLORIDE, PRESERVATIVE FREE 10 ML: 5 INJECTION INTRAVENOUS at 20:42

## 2020-12-27 RX ADMIN — SODIUM CHLORIDE, PRESERVATIVE FREE 10 ML: 5 INJECTION INTRAVENOUS at 08:01

## 2020-12-27 ASSESSMENT — ENCOUNTER SYMPTOMS: SHORTNESS OF BREATH: 0

## 2020-12-27 NOTE — ANESTHESIA PRE PROCEDURE
Department of Anesthesiology  Preprocedure Note       Name:  Melissa Johnson   Age:  30 y.o.  :  1995                                          MRN:  [id number]         Date:  2020      Surgeon: Brennan Frausto):  Theodore Harris DO    Procedure: Procedure(s):   SECTION    Medications prior to admission:   Prior to Admission medications    Medication Sig Start Date End Date Taking? Authorizing Provider   aspirin 81 MG EC tablet Take 81 mg by mouth daily 9/15/20   Historical Provider, MD   Misc. Devices (BREAST PUMP) [facility] Double electric breast pump 20   JP Garland - CNP   Prenatal Vit-Fe Fumarate-FA (PRENATAL PO) Take by mouth    Historical Provider, MD       Current medications:    No current facility-administered medications for this visit. No current outpatient medications on file.      Facility-Administered Medications Ordered in Other Visits   Medication Dose Route Frequency Provider Last Rate Last Admin    [START ON 2020] ceFAZolin (ANCEF) 2 g in dextrose 5 % 50 mL IVPB  2 g Intravenous Once Chichi Galvan DO        [START ON 2020] citric acid-sodium citrate (BICITRA) solution 30 mL  30 mL Oral Once [employer], DO        docusate sodium (COLACE) capsule 100 mg  100 mg Oral Daily PRN Petrona Mccall, DO        aspirin chewable tablet 81 mg  81 mg Oral Daily Bettey Reddish, DO   81 mg at 20 0801    sodium chloride flush 0.9 % injection 10 mL  10 mL Intravenous BID Wyatt Cooler, DO   10 mL at 20 0801    acetaminophen (TYLENOL) tablet 1,000 mg  1,000 mg Oral Q6H PRN Bettey Reddish, DO        diphenhydrAMINE (BENADRYL) tablet 25 mg  25 mg Oral Q4H PRN Bettey Reddish, DO        ondansetron [facility]) injection 4 mg  4 mg Intravenous Q6H PRN Bettey Reddish, DO           Allergies:  No Known Allergies    Problem List:    Patient Active Problem List   Diagnosis Code    Rh neg/RNI/GBS unk O09.91    Hx Pulmonary Stenosis s/p Valvuloplasty  Z86.79  Vasa previa O69. 4XX0    Marginal cord insertion O43.199    Lagging AC  O09.93    BMI 44.80 O99.210    31 weeks gestation of pregnancy Z3A.31    Celestone 20,  R89.9    Gestational hypertension (G1) O13.9    Placenta marginalis in third trimester O44.23    Placenta succenturiata in third trimester O43. 80    Poor fetal growth complicating pregnancy, antepartum O36.5990    33 weeks gestation of pregnancy Z3A.33     screening for fetal growth retardation using ultrasonics Z36.4       Past Medical History:        Diagnosis Date    Pulmonary valve stenosis     age 1        Past Surgical History:        Procedure Laterality Date    CARDIAC CATHETERIZATION      x3 different     CARDIAC VALVE SURGERY      age 1- Balloon valvuoplasty     IR NONTUNNELED VASCULAR CATHETER  2020    IR NONTUNNELED VASCULAR CATHETER 2020 [facility]    VULVA SURGERY      x2        Social History:    Social History     Tobacco Use    Smoking status: Never Smoker    Smokeless tobacco: Never Used   Substance Use Topics    Alcohol use: Not Currently                                Counseling given: Not Answered      Vital Signs (Current): There were no vitals filed for this visit.                                            BP Readings from Last 3 Encounters:   20 132/75   20 130/82   20 (!) 102/49       NPO Status:                                                                                 BMI:   Wt Readings from Last 3 Encounters:   20 256 lb 6.3 oz (116.3 kg)   20 262 lb (118.8 kg)   20 261 lb (118.4 kg)     There is no height or weight on file to calculate BMI.    CBC:   Lab Results   Component Value Date    WBC 11.5 2020    RBC 3.66 2020    HGB 10.6 2020    HCT 32.6 2020    MCV 89.1 2020    RDW 12.8 2020     2020       CMP:   Lab Results   Component Value Date     2020 K 4.1 12/17/2020     12/17/2020    CO2 23 12/17/2020    BUN 9 12/17/2020    CREATININE 0.45 12/17/2020    GFRAA >60 12/17/2020    LABGLOM >60 12/17/2020    GLUCOSE 89 12/17/2020    PROT 5.8 12/17/2020    CALCIUM 8.8 12/17/2020    BILITOT 0.43 12/17/2020    ALKPHOS 100 12/17/2020    AST 17 12/17/2020    ALT 19 12/17/2020       POC Tests: No results for input(s): POCGLU, POCNA, POCK, POCCL, POCBUN, POCHEMO, POCHCT in the last 72 hours. Coags: No results found for: PROTIME, INR, APTT    HCG (If Applicable): No results found for: PREGTESTUR, PREGSERUM, HCG, HCGQUANT     ABGs: No results found for: PHART, PO2ART, HGF7ECK, CHM9RVH, BEART, N5YNAEFG     Type & Screen (If Applicable):  No results found for: LABABO, LABRH    Drug/Infectious Status (If Applicable):  No results found for: HIV, HEPCAB    COVID-19 Screening (If Applicable):   Lab Results   Component Value Date    COVID19 Not Detected 12/01/2020         Anesthesia Evaluation  Patient summary reviewed and Nursing notes reviewed no history of anesthetic complications:   Airway: Mallampati: I  TM distance: >3 FB   Neck ROM: full  Mouth opening: > = 3 FB Dental:          Pulmonary:normal exam  breath sounds clear to auscultation      (-) COPD, asthma, shortness of breath, recent URI and sleep apnea                           Cardiovascular:  Exercise tolerance: good (>4 METS),   (+) hypertension:, valvular problems/murmurs (Hx PS s/p valvuloplasty, mild PS, mild MI):,     (-) past MI, CAD, CABG/stent,  angina,  CHF, orthopnea and  LR      Rhythm: regular  Rate: normal                    Neuro/Psych:   Negative Neuro/Psych ROS     (-) seizures, TIA and CVA           GI/Hepatic/Renal: Neg GI/Hepatic/Renal ROS       (-) GERD       Endo/Other:        (-) diabetes mellitus                ROS comment: Pregnant  Vasa previa with marginal cord insertion Abdominal:           Vascular: negative vascular ROS.                                          Anesthesia Plan spinal     ASA 3     (Hx of PS with balloon valvuloplasty at age 1. She has mild residual PS and mild PI per recent cardiology note in media section. - we recommend her  section be performed in the main operating suite with cardiac anesthesia. - Discussed with Dr. Eloina Moore)    arterial line    Anesthetic plan and risks discussed with patient. Plan discussed with surgical team and CRNA.                 JP Alvarado - CRNA   2020

## 2020-12-27 NOTE — PROGRESS NOTES
OB/GYN PROGRESS NOTE     Kitty Files a 30 y.o. female  at 34w4d   Hospital Day: 24     Subjective:   Patient with no changes overnight or throughout today.   States she is feeling good movement, no contractions, no vaginal bleeding     Vitals:   BP: 127/69  HR: 97  Resp: 18  Temp: 98.1        FHT: 135bpm, moderate variability, accelerations present, decelerations absent  Contractions: none     Physical Exam:  General appearance:  no apparent distress, alert and cooperative  Abdomen:  soft, gravid, non-tender, no rebound, guarding, or rigidity, no RUQ or epigastric tenderness, no signs or symptoms of abruption and no signs or symptoms of chorioamnionitis  Extremities:  no calf tenderness, non edematous, DTR's: +2/4 bilateral lower extremities      Assessment/Plan:  Kitty Files a 30 y.o. female  at 34w4d IUP             Inpatient management of Krist[facility]- [facility] q Monday's weekly                - S/P celestone  and , will plan for rescue course              - Plan for primary  section @ 35 weeks 1 day scheduled w/ Dr. Francy Moore               - Continue ASA 81 mg daily                   Hx Pulmonary Stenosis s/p Valvuloplasty (1yo)              - Echo : mild to moderate pulmonary insufficiency, stable from 2016               - Patient evaluated by cardiology and recommended f/u in 3 years             - Plan is for PLTCS in main OR, NOT IN OB OR per anesthesia request - pt aware

## 2020-12-27 NOTE — PROGRESS NOTES
OB/GYN PROGRESS NOTE     Navneet valles 30 y.o. female  at 34w3d   Hospital Day: 23     Subjective:   Patient with no changes overnight or throughout today. Patient is with NO new complaints, concerns or questions today.   States she is feeling good movement, no contractions, no vaginal bleeding     Vitals:   BP: 117/66  HR: 89  Resp: 18  Temp: 97.9        FHT: 135bpm, moderate variability, accelerations present, decelerations absent  Contractions: none     Physical Exam:  General appearance:  no apparent distress, alert and cooperative  Abdomen:  soft, gravid, non-tender, no rebound, guarding, or rigidity, no RUQ or epigastric tenderness, no signs or symptoms of abruption and no signs or symptoms of chorioamnionitis  Extremities:  no calf tenderness, non edematous, DTR's: +2/4 bilateral lower extremities      Assessment/Plan:  Navneet valles 30 y.o. female  at [place] [address]             Inpatient management of Kristina Manzano- [facility] q Monday's weekly                - S/P celestone  and , will plan for rescue course              - Plan for primary  section @ 35 weeks 1 day scheduled w/ Dr. Toma Dias               - Continue ASA 81 mg daily                   Hx Pulmonary Stenosis s/p Valvuloplasty (3yo)              - Echo : mild to moderate pulmonary insufficiency, stable from 2016               - Patient evaluated by cardiology and recommended f/u in 3 years             - Plan is for PLTCS in main OR, NOT IN OB OR per anesthesia request - pt aware

## 2020-12-27 NOTE — PROGRESS NOTES
OB/GYN PROGRESS NOTE    Jayden Arredondo is a 30 y.o. female  at 29w0d, Hospital Day: 32    Subjective:   Patient has been seen and examined. Patient is doing well, complaining of nothing. She is having good regular bowel movements. Patient denies any vaginal discharge and any urinary complaints. The patient reports fetal movement is present, denies contractions, denies loss of fluid, denies vaginal bleeding. Patient denies headache, vision changes, nausea, vomiting, fever, chills, shortness of breath, chest pain, RUQ pain, abdominal pain, diarrhea, change in color/amount/odor of vaginal discharge, dysuria or, hematuria.      Objective:   Vitals:  Vitals:    20 1939 20 0804   BP: (!) 146/64  126/65    Pulse: 109  103    Resp: 20  16    Temp: 98.6 °F (37 °C) 98 °F (36.7 °C)  98.1 °F (36.7 °C)   TempSrc: Oral Oral     Weight:       Height:             FHT: 135, moderate variability, accelerations present, no decelerations  Contractions: none    Physical Exam:  General appearance:  no apparent distress, alert and cooperative  HEENT: head atraumatic, normocephalic, moist mucous membranes, trachea midline  Neurologic:  alert, oriented, normal speech, no focal findings or movement disorder noted  Lungs:  No increased work of breathing, good air exchange, clear to auscultation bilaterally, no crackles or wheezing  Heart:  Normal apical impulse, regular rate and rhythm, normal S1 and S2, no S3 or S4, and no murmur noted    Abdomen:  soft, gravid, non-tender, no rebound, guarding, or rigidity, no RUQ or epigastric tenderness, no signs or symptoms of abruption and no signs or symptoms of chorioamnionitis  Extremities:  no calf tenderness, non edematous  Musculoskeletal: Gross strength equal and intact throughout, no gross abnormalities, range of motion normal in hips, knees, shoulders and spine  Psychiatric: Mood appropriate, normal affect   Rectal Exam: not indicated  Pelvic Exam: not indicated    DATA:  Labs:     Lab Results   Component Value Date    WBC 11.5 (H) 2020    HGB 10.6 (L) 2020    HCT 32.6 (L) 2020    MCV 89.1 2020     2020     Diagnostics:             Assessment/Plan:  Krystle Arredondo is a 30 y.o. female  at 35w0d IUP with Vasa Previa   - Rh negative/ Rubella non-immune/ GBS negative   - Will need MMR postpartum and Rhogam w/u PP   - No indication for GBS prophylaxis    - Rhogam: given 20   - Influenza vaccination: given 20   - Tdap vaccination: given 20   - CBC, T&S q3 days     - CBC was not drawn yesterday, Anesthesiology contacted to see if they need CBC today or tomorrow before her scheduled CS @ 0800, 20 and recommended CBC okay for today    - Will obtain another peripheral line today with CBC draw at same time    - T&S drawn last yesterday 20, due next 20   - CEFM/TOCO    - MFM scan on  schedule with last being 20 as seen above as patient is scheduled for delivery 20   - R midline in place (placed 20)   - Continue PNV, SCDs, ASA daily   - DVT prophylaxis: SCDs and Heparin 5000U BID (started on 20 and last dose is this morning @ 0800, 20), s/p lovenox 40 mg qd   -  section scheduled in the Main OR on 20 @0800 at 35w1d GA   - NPO @MN   - VSS   - Cat I FHT, TOCO none   - Patient doing well without complaints   - CS consent completed and in the chart on 20   - NICU consult completed 12/3/20   - Anesthesia consulted and recommending to complete CS in the main OR    S/p celestone 20, , ,     gHTN (new dx)   - Pt denies any s/s PreE    - PreE labs WNL x2, P/C 0.12 ()   - BPs normotensive with rarely 618H systolic BP    Hx pulmonary stenosis s/p valvuloplasty   - Last Echo and visit with Peds Cardiology 20 (noted in media): mild to moderate pulmonary insufficiency stable from 2016   - Pt denies any CP, SOB   - Anesthesia consulted and recommending to complete CS in the main OR    Marginal cord insertion   - Noted on last MFM ultrasound    Anterior placental accessory lobe   - Noted on last MFM ultrasound     Fetal Lagging AC (<3rd%)   - NIPT wnl   - MFM scan on , last on 20 and scheduled for delivery 20     Anemia (Hgb 11.0>10.4>>>10.6)   - Pt denies any s/s anemia   - VSS   - Will obtain CBC today    BMI 44.80      Patient Active Problem List    Diagnosis Date Noted    Celestone 20, 2020     Priority: High     screening for fetal growth retardation using ultrasonics     33 weeks gestation of pregnancy     Placenta marginalis in third trimester     Placenta succenturiata in third trimester     Poor fetal growth complicating pregnancy, antepartum     Gestational hypertension (G1) 2020    31 weeks gestation of pregnancy 2020    Vasa previa 2020     Overview Note:     Plan for inpatient management at 32 weeks.  Marginal cord insertion 2020    Lagging AC  2020    BMI 44.80 2020    Rh neg/RNI/GBS unk 2020     Overview Note:     Intake not completed. Pt desires care with different [facility] records/ultrasound/labs  from previous provider from [place].  scanned into media. Pt moved from        Pulmonary Stenosis s/p Valvuloplasty  2020     Overview Note:     Dx at 2 and underwent a balloon valvuloplasty at age 1 years, performed by Angel Quesada at [facility]  In [place]. Most recent visit to peds cardiology scanned into [id number]         Will update Dr. Hernandez Jha.      Earlene Snell DO  Ob/Gyn Resident  2020, 8:05 AM

## 2020-12-27 NOTE — PROGRESS NOTES
OB/GYN PROGRESS NOTE     Ramón valles 30 y.o. female  at [address] [place] [address] Day: 21     Subjective:   Patient with no changes overnight or throughout today.   States she is feeling good movement, no contractions, no vaginal bleeding     Vitals:   BP: 121/61  HR: 88  Resp: 16  Temp: 98.1        FHT: 135bpm, moderate variability, accelerations present, decelerations absent  Contractions: none     Physical Exam:  General appearance:  no apparent distress, alert and cooperative  Abdomen:  soft, gravid, non-tender, no rebound, guarding, or rigidity, no RUQ or epigastric tenderness, no signs or symptoms of abruption and no signs or symptoms of chorioamnionitis  Extremities:  no calf tenderness, non edematous, DTR's: +2/4 bilateral lower extremities      Assessment/Plan:  Ramón valles 30 y.o. female  at [address] [facility]             Inpatient management of Vasa Previa                           - MFM q Monday's weekly                - S/P celestone  and , will plan for rescue course              - Plan for primary  section @ 35 weeks 1 day scheduled w/ Dr. Kevin Ellsworth               - Continue ASA 81 mg daily                   Hx Pulmonary Stenosis s/p Valvuloplasty (1yo)              - Echo : mild to moderate pulmonary insufficiency, stable from 2016               - Patient evaluated by cardiology and recommended f/u in 3 years   - Plan is for PLTCS in main OR, NOT IN OB OR per anesthesia request - discussion with patient about change in location of C/S

## 2020-12-27 NOTE — PROGRESS NOTES
Izzy valles 30 y.o. female  at 34w6d   Hospital Day: 26     Subjective:   Patient with no changes overnight or throughout today. Patient with a lot of questions about Monday and the  section. States she is feeling good movement, no contractions, no vaginal bleeding     Vitals:   BP: 126/65  HR: 103  Resp: 18  Temp: 98.0        FHT: 135bpm, moderate variability, accelerations present, decelerations absent  Contractions: none     Physical Exam:  General appearance:  no apparent distress, alert and cooperative  Abdomen:  soft, gravid, non-tender, no rebound, guarding, or rigidity, no RUQ or epigastric tenderness, no signs or symptoms of abruption and no signs or symptoms of chorioamnionitis  Extremities:  no calf tenderness, non edematous, DTR's: +2/4 bilateral lower extremities      Assessment/Plan:  Izzy valles 30 y.o. female  at [address] [place] [address]             Inpatient management of Vashai Doll- M q Monday's weekly                - S/P celestone  and , will plan for rescue course              - Plan for primary  section @ 35 weeks 1 day scheduled w/ Dr. Jim Mendenhall               - Continue ASA 81 mg daily                   Hx Pulmonary Stenosis s/p Valvuloplasty (3yo)              - Echo : mild to moderate pulmonary insufficiency, stable from 2016               - Patient evaluated by cardiology and recommended f/u in 3 years             - Plan is for PLTCS in main OR, NOT IN OB OR per anesthesia request - pt aware    Today we reviewed the spinal procedure, spinal effects, the IV fluids to be administered approximately over 2 hour interval prior to  section, recovery in postop area for at least the first hour, and if baby is stable, patient may be able to come to L&D for her second hour of recovery to be with  and baby.   Discussed pain management after  section questions as well as timing of having catheter placed after spinal and removal if no complications and adequate urine output after  section at 6-8 hours post op if patient is ambulatory. We reviewed the timing of her heparin for her AM dosing only tomorrow and no PM dosing on 2020.

## 2020-12-27 NOTE — PROGRESS NOTES
Kojo valles 30 y.o. female  at 34w5d   Hospital Day: 25     Subjective:   Patient with no changes overnight or throughout today. Patient with no complaints or concerns today.   States she is feeling good movement, no contractions, no vaginal bleeding     Vitals:   BP: 139/71  HR: 88  Resp: 16  Temp: 98.0        FHT: 135bpm, moderate variability, accelerations present, decelerations absent  Contractions: none    Labs: 2020: Hb: 10.6 / PLT: 243     Physical Exam:  General appearance:  no apparent distress, alert and cooperative  Abdomen:  soft, gravid, non-tender, no rebound, guarding, or rigidity, no RUQ or epigastric tenderness, no signs or symptoms of abruption and no signs or symptoms of chorioamnionitis  Extremities:  no calf tenderness, non edematous, DTR's: +2/4 bilateral lower extremities      Assessment/Plan:  Kojo valles 30 y.o. female  at [address]             Inpatient management of [facility] q Monday's weekly                - S/P celestone  and , will plan for rescue course              - Plan for primary  section @ 35 weeks 1 day scheduled w/ Dr. Daphney Hall               - Continue ASA 81 mg daily                   Hx Pulmonary Stenosis s/p Valvuloplasty (3yo)              - Echo : mild to moderate pulmonary insufficiency, stable from 2016               - Patient evaluated by cardiology and recommended f/u in 3 years             - Plan is for PLTCS in main OR, NOT IN OB OR per anesthesia request - pt aware

## 2020-12-28 ENCOUNTER — ANESTHESIA (OUTPATIENT)
Dept: OPERATING ROOM | Age: 25
End: 2020-12-28
Payer: COMMERCIAL

## 2020-12-28 VITALS — SYSTOLIC BLOOD PRESSURE: 124 MMHG | TEMPERATURE: 93.5 F | OXYGEN SATURATION: 97 % | DIASTOLIC BLOOD PRESSURE: 75 MMHG

## 2020-12-28 PROBLEM — Z3A.31 31 WEEKS GESTATION OF PREGNANCY: Status: RESOLVED | Noted: 2020-12-01 | Resolved: 2020-12-28

## 2020-12-28 PROBLEM — Z98.890 POST-OPERATIVE STATE: Status: ACTIVE | Noted: 2020-12-28

## 2020-12-28 PROCEDURE — 2580000003 HC RX 258

## 2020-12-28 PROCEDURE — 2500000003 HC RX 250 WO HCPCS

## 2020-12-28 PROCEDURE — 6360000002 HC RX W HCPCS: Performed by: STUDENT IN AN ORGANIZED HEALTH CARE EDUCATION/TRAINING PROGRAM

## 2020-12-28 PROCEDURE — 96374 THER/PROPH/DIAG INJ IV PUSH: CPT

## 2020-12-28 PROCEDURE — 6360000002 HC RX W HCPCS

## 2020-12-28 PROCEDURE — 7100000000 HC PACU RECOVERY - FIRST 15 MIN: Performed by: OBSTETRICS & GYNECOLOGY

## 2020-12-28 PROCEDURE — 6370000000 HC RX 637 (ALT 250 FOR IP): Performed by: STUDENT IN AN ORGANIZED HEALTH CARE EDUCATION/TRAINING PROGRAM

## 2020-12-28 PROCEDURE — 2580000003 HC RX 258: Performed by: STUDENT IN AN ORGANIZED HEALTH CARE EDUCATION/TRAINING PROGRAM

## 2020-12-28 PROCEDURE — 2580000003 HC RX 258: Performed by: OBSTETRICS & GYNECOLOGY

## 2020-12-28 PROCEDURE — 10H073Z INSERTION OF MONITORING ELECTRODE INTO PRODUCTS OF CONCEPTION, VIA NATURAL OR ARTIFICIAL OPENING: ICD-10-PCS | Performed by: OBSTETRICS & GYNECOLOGY

## 2020-12-28 PROCEDURE — 1220000000 HC SEMI PRIVATE OB R&B

## 2020-12-28 PROCEDURE — 3600000013 HC SURGERY LEVEL 3 ADDTL 15MIN: Performed by: OBSTETRICS & GYNECOLOGY

## 2020-12-28 PROCEDURE — 7100000001 HC PACU RECOVERY - ADDTL 15 MIN: Performed by: OBSTETRICS & GYNECOLOGY

## 2020-12-28 PROCEDURE — 3700000001 HC ADD 15 MINUTES (ANESTHESIA): Performed by: OBSTETRICS & GYNECOLOGY

## 2020-12-28 PROCEDURE — 2709999900 HC NON-CHARGEABLE SUPPLY: Performed by: OBSTETRICS & GYNECOLOGY

## 2020-12-28 PROCEDURE — 3600000003 HC SURGERY LEVEL 3 BASE: Performed by: OBSTETRICS & GYNECOLOGY

## 2020-12-28 PROCEDURE — 59510 CESAREAN DELIVERY: CPT | Performed by: OBSTETRICS & GYNECOLOGY

## 2020-12-28 PROCEDURE — 88307 TISSUE EXAM BY PATHOLOGIST: CPT

## 2020-12-28 PROCEDURE — 3700000000 HC ANESTHESIA ATTENDED CARE: Performed by: OBSTETRICS & GYNECOLOGY

## 2020-12-28 RX ORDER — DEXAMETHASONE SODIUM PHOSPHATE 4 MG/ML
INJECTION, SOLUTION INTRA-ARTICULAR; INTRALESIONAL; INTRAMUSCULAR; INTRAVENOUS; SOFT TISSUE PRN
Status: DISCONTINUED | OUTPATIENT
Start: 2020-12-28 | End: 2020-12-28 | Stop reason: SDUPTHER

## 2020-12-28 RX ORDER — ONDANSETRON 2 MG/ML
INJECTION INTRAMUSCULAR; INTRAVENOUS PRN
Status: DISCONTINUED | OUTPATIENT
Start: 2020-12-28 | End: 2020-12-28 | Stop reason: SDUPTHER

## 2020-12-28 RX ORDER — SODIUM CHLORIDE, SODIUM LACTATE, POTASSIUM CHLORIDE, CALCIUM CHLORIDE 600; 310; 30; 20 MG/100ML; MG/100ML; MG/100ML; MG/100ML
INJECTION, SOLUTION INTRAVENOUS CONTINUOUS PRN
Status: DISCONTINUED | OUTPATIENT
Start: 2020-12-28 | End: 2020-12-28 | Stop reason: SDUPTHER

## 2020-12-28 RX ORDER — SENNA PLUS 8.6 MG/1
1 TABLET ORAL 2 TIMES DAILY
Qty: 60 TABLET | Refills: 0 | Status: SHIPPED | OUTPATIENT
Start: 2020-12-28 | End: 2021-04-21 | Stop reason: ALTCHOICE

## 2020-12-28 RX ORDER — IBUPROFEN 600 MG/1
600 TABLET ORAL EVERY 6 HOURS PRN
Qty: 30 TABLET | Refills: 0 | Status: SHIPPED | OUTPATIENT
Start: 2020-12-28

## 2020-12-28 RX ORDER — SIMETHICONE 80 MG
80 TABLET,CHEWABLE ORAL EVERY 6 HOURS PRN
Status: DISCONTINUED | OUTPATIENT
Start: 2020-12-28 | End: 2020-12-30 | Stop reason: HOSPADM

## 2020-12-28 RX ORDER — POLYETHYLENE GLYCOL 3350 17 G/17G
17 POWDER, FOR SOLUTION ORAL DAILY PRN
Status: DISCONTINUED | OUTPATIENT
Start: 2020-12-28 | End: 2020-12-30 | Stop reason: HOSPADM

## 2020-12-28 RX ORDER — DIPHENHYDRAMINE HYDROCHLORIDE 50 MG/ML
25 INJECTION INTRAMUSCULAR; INTRAVENOUS EVERY 6 HOURS PRN
Status: DISCONTINUED | OUTPATIENT
Start: 2020-12-28 | End: 2020-12-30 | Stop reason: HOSPADM

## 2020-12-28 RX ORDER — SODIUM CHLORIDE, SODIUM LACTATE, POTASSIUM CHLORIDE, CALCIUM CHLORIDE 600; 310; 30; 20 MG/100ML; MG/100ML; MG/100ML; MG/100ML
INJECTION, SOLUTION INTRAVENOUS CONTINUOUS
Status: DISCONTINUED | OUTPATIENT
Start: 2020-12-28 | End: 2020-12-29

## 2020-12-28 RX ORDER — ONDANSETRON 2 MG/ML
4 INJECTION INTRAMUSCULAR; INTRAVENOUS EVERY 6 HOURS PRN
Status: DISCONTINUED | OUTPATIENT
Start: 2020-12-28 | End: 2020-12-28 | Stop reason: HOSPADM

## 2020-12-28 RX ORDER — KETOROLAC TROMETHAMINE 30 MG/ML
INJECTION, SOLUTION INTRAMUSCULAR; INTRAVENOUS
Status: COMPLETED
Start: 2020-12-28 | End: 2020-12-28

## 2020-12-28 RX ORDER — LANOLIN 72 %
OINTMENT (GRAM) TOPICAL
Status: DISCONTINUED | OUTPATIENT
Start: 2020-12-28 | End: 2020-12-30 | Stop reason: HOSPADM

## 2020-12-28 RX ORDER — SENNA AND DOCUSATE SODIUM 50; 8.6 MG/1; MG/1
1 TABLET, FILM COATED ORAL 2 TIMES DAILY
Status: DISCONTINUED | OUTPATIENT
Start: 2020-12-29 | End: 2020-12-30 | Stop reason: HOSPADM

## 2020-12-28 RX ORDER — VITAMIN A, ASCORBIC ACID, CHOLECALCIFEROL, .ALPHA.-TOCOPHEROL ACETATE, DL-, THIAMINE MONONITRATE, RIBOFLAVIN, NIACINAMIDE, PYRIDOXINE HYDROCHLORIDE, FOLIC ACID, CYANOCOBALAMIN, CALCIUM CARBONATE, IRON, ZINC OXIDE, AND CUPRIC OXIDE 4000; 120; 400; 22; 1.84; 3; 20; 10; 1; 12; 200; 29; 25; 2 [IU]/1; MG/1; [IU]/1; [IU]/1; MG/1; MG/1; MG/1; MG/1; MG/1; UG/1; MG/1; MG/1; MG/1; MG/1
1 TABLET ORAL DAILY
Status: DISCONTINUED | OUTPATIENT
Start: 2020-12-28 | End: 2020-12-30 | Stop reason: HOSPADM

## 2020-12-28 RX ORDER — MORPHINE SULFATE 10 MG/ML
INJECTION, SOLUTION INTRAMUSCULAR; INTRAVENOUS PRN
Status: DISCONTINUED | OUTPATIENT
Start: 2020-12-28 | End: 2020-12-28 | Stop reason: SDUPTHER

## 2020-12-28 RX ORDER — NALOXONE HYDROCHLORIDE 0.4 MG/ML
0.4 INJECTION, SOLUTION INTRAMUSCULAR; INTRAVENOUS; SUBCUTANEOUS PRN
Status: DISCONTINUED | OUTPATIENT
Start: 2020-12-28 | End: 2020-12-28 | Stop reason: HOSPADM

## 2020-12-28 RX ORDER — BUPIVACAINE HYDROCHLORIDE 7.5 MG/ML
INJECTION, SOLUTION INTRASPINAL PRN
Status: DISCONTINUED | OUTPATIENT
Start: 2020-12-28 | End: 2020-12-28 | Stop reason: SDUPTHER

## 2020-12-28 RX ORDER — ONDANSETRON 2 MG/ML
4 INJECTION INTRAMUSCULAR; INTRAVENOUS EVERY 6 HOURS PRN
Status: DISCONTINUED | OUTPATIENT
Start: 2020-12-28 | End: 2020-12-30 | Stop reason: HOSPADM

## 2020-12-28 RX ORDER — MAGNESIUM HYDROXIDE 1200 MG/15ML
LIQUID ORAL PRN
Status: DISCONTINUED | OUTPATIENT
Start: 2020-12-28 | End: 2020-12-28 | Stop reason: ALTCHOICE

## 2020-12-28 RX ORDER — PHENYLEPHRINE HCL IN 0.9% NACL 1 MG/10 ML
SYRINGE (ML) INTRAVENOUS PRN
Status: DISCONTINUED | OUTPATIENT
Start: 2020-12-28 | End: 2020-12-28 | Stop reason: SDUPTHER

## 2020-12-28 RX ORDER — PHENYLEPHRINE HYDROCHLORIDE 10 MG/ML
INJECTION INTRAVENOUS PRN
Status: DISCONTINUED | OUTPATIENT
Start: 2020-12-28 | End: 2020-12-28 | Stop reason: SDUPTHER

## 2020-12-28 RX ORDER — IBUPROFEN 800 MG/1
800 TABLET ORAL EVERY 8 HOURS PRN
Status: DISCONTINUED | OUTPATIENT
Start: 2020-12-29 | End: 2020-12-30 | Stop reason: HOSPADM

## 2020-12-28 RX ORDER — SODIUM CHLORIDE 0.9 % (FLUSH) 0.9 %
10 SYRINGE (ML) INJECTION PRN
Status: DISCONTINUED | OUTPATIENT
Start: 2020-12-28 | End: 2020-12-30 | Stop reason: HOSPADM

## 2020-12-28 RX ORDER — OXYCODONE HYDROCHLORIDE AND ACETAMINOPHEN 5; 325 MG/1; MG/1
2 TABLET ORAL EVERY 4 HOURS PRN
Status: DISCONTINUED | OUTPATIENT
Start: 2020-12-29 | End: 2020-12-30 | Stop reason: HOSPADM

## 2020-12-28 RX ORDER — OXYCODONE HYDROCHLORIDE AND ACETAMINOPHEN 5; 325 MG/1; MG/1
1 TABLET ORAL EVERY 6 HOURS PRN
Qty: 20 TABLET | Refills: 0 | Status: SHIPPED | OUTPATIENT
Start: 2020-12-28 | End: 2021-01-04

## 2020-12-28 RX ORDER — KETOROLAC TROMETHAMINE 30 MG/ML
30 INJECTION, SOLUTION INTRAMUSCULAR; INTRAVENOUS EVERY 6 HOURS
Status: COMPLETED | OUTPATIENT
Start: 2020-12-28 | End: 2020-12-29

## 2020-12-28 RX ORDER — ACETAMINOPHEN 500 MG
1000 TABLET ORAL EVERY 6 HOURS PRN
Status: DISCONTINUED | OUTPATIENT
Start: 2020-12-29 | End: 2020-12-30 | Stop reason: HOSPADM

## 2020-12-28 RX ORDER — NALBUPHINE HCL 10 MG/ML
5 AMPUL (ML) INJECTION EVERY 4 HOURS PRN
Status: DISCONTINUED | OUTPATIENT
Start: 2020-12-28 | End: 2020-12-28 | Stop reason: HOSPADM

## 2020-12-28 RX ORDER — OXYCODONE HYDROCHLORIDE AND ACETAMINOPHEN 5; 325 MG/1; MG/1
1 TABLET ORAL EVERY 4 HOURS PRN
Status: DISCONTINUED | OUTPATIENT
Start: 2020-12-29 | End: 2020-12-30 | Stop reason: HOSPADM

## 2020-12-28 RX ORDER — BISACODYL 10 MG
10 SUPPOSITORY, RECTAL RECTAL DAILY PRN
Status: DISCONTINUED | OUTPATIENT
Start: 2020-12-28 | End: 2020-12-30 | Stop reason: HOSPADM

## 2020-12-28 RX ADMIN — KETOROLAC TROMETHAMINE 30 MG: 30 INJECTION, SOLUTION INTRAMUSCULAR at 10:52

## 2020-12-28 RX ADMIN — PHENYLEPHRINE HYDROCHLORIDE 100 MCG: 10 INJECTION INTRAVENOUS at 08:41

## 2020-12-28 RX ADMIN — CEFAZOLIN 2 G: 10 INJECTION, POWDER, FOR SOLUTION INTRAVENOUS at 20:36

## 2020-12-28 RX ADMIN — PHENYLEPHRINE HYDROCHLORIDE 200 MCG: 10 INJECTION INTRAVENOUS at 08:28

## 2020-12-28 RX ADMIN — ONDANSETRON 4 MG: 2 INJECTION INTRAMUSCULAR; INTRAVENOUS at 08:44

## 2020-12-28 RX ADMIN — SODIUM CHLORIDE, POTASSIUM CHLORIDE, SODIUM LACTATE AND CALCIUM CHLORIDE: 600; 310; 30; 20 INJECTION, SOLUTION INTRAVENOUS at 10:55

## 2020-12-28 RX ADMIN — CEFAZOLIN 2 G: 10 INJECTION, POWDER, FOR SOLUTION INTRAVENOUS at 08:22

## 2020-12-28 RX ADMIN — DEXAMETHASONE SODIUM PHOSPHATE 4 MG: 4 INJECTION, SOLUTION INTRAMUSCULAR; INTRAVENOUS at 08:45

## 2020-12-28 RX ADMIN — SODIUM CITRATE AND CITRIC ACID MONOHYDRATE 30 ML: 500; 334 SOLUTION ORAL at 07:15

## 2020-12-28 RX ADMIN — PHENYLEPHRINE HYDROCHLORIDE 100 MCG: 10 INJECTION INTRAVENOUS at 08:26

## 2020-12-28 RX ADMIN — Medication 100 MCG: at 08:46

## 2020-12-28 RX ADMIN — SODIUM CHLORIDE, POTASSIUM CHLORIDE, SODIUM LACTATE AND CALCIUM CHLORIDE: 600; 310; 30; 20 INJECTION, SOLUTION INTRAVENOUS at 20:37

## 2020-12-28 RX ADMIN — Medication 909 ML/HR: at 08:44

## 2020-12-28 RX ADMIN — PHENYLEPHRINE HYDROCHLORIDE 200 MCG: 10 INJECTION INTRAVENOUS at 08:35

## 2020-12-28 RX ADMIN — KETOROLAC TROMETHAMINE 30 MG: 30 INJECTION, SOLUTION INTRAMUSCULAR at 20:07

## 2020-12-28 RX ADMIN — BUPIVACAINE HYDROCHLORIDE IN DEXTROSE 2 ML: 7.5 INJECTION, SOLUTION SUBARACHNOID at 08:19

## 2020-12-28 RX ADMIN — MORPHINE SULFATE 0.2 MG: 10 INJECTION INTRAVENOUS at 08:19

## 2020-12-28 RX ADMIN — Medication 100 MCG: at 08:45

## 2020-12-28 RX ADMIN — SODIUM CHLORIDE, POTASSIUM CHLORIDE, SODIUM LACTATE AND CALCIUM CHLORIDE: 600; 310; 30; 20 INJECTION, SOLUTION INTRAVENOUS at 07:40

## 2020-12-28 ASSESSMENT — PULMONARY FUNCTION TESTS
PIF_VALUE: 0
PIF_VALUE: 1
PIF_VALUE: 0
PIF_VALUE: 0
PIF_VALUE: 1
PIF_VALUE: 0
PIF_VALUE: 1
PIF_VALUE: 0
PIF_VALUE: 1
PIF_VALUE: 0
PIF_VALUE: 1
PIF_VALUE: 0

## 2020-12-28 ASSESSMENT — PAIN SCALES - GENERAL
PAINLEVEL_OUTOF10: 3
PAINLEVEL_OUTOF10: 0
PAINLEVEL_OUTOF10: 0
PAINLEVEL_OUTOF10: 6
PAINLEVEL_OUTOF10: 0

## 2020-12-28 NOTE — LACTATION NOTE
Written information given, showed mom how to support baby and breast for  feeding. Showed mom how to manually express breast milk.

## 2020-12-28 NOTE — CARE COORDINATION
POST-PARTUM/WIN INITIAL DISCHARGE PLANNING/CARE COORDINATION    31 weeks gestation of pregnancy [Z3A.31]    HPI: Writer met w/ Warren Luna at bedside to discuss DCP. Anticipate DC of couplet 1/1/21 after Csection 12/28/2020. Infant name on BC: Kacie Arredondo. Infant to  [facility]. Infant PCP [facility]. FOB: Honey Johnson verified name/address/phone number/[employer] insurance correct on facesheet    Writer notified patient has 30 days from date of birth to add infant to insurance policy. Warren Luna verbalized understanding and will call [employer]. Warren Luna verbalized has all necessary items for infant. No previous home care or dme and no anticipated need for home nursing or dme. CM continue to follow for any DC needs.

## 2020-12-28 NOTE — ANESTHESIA POSTPROCEDURE EVALUATION
Department of Anesthesiology  Postprocedure Note    Patient: Kenyatta Ceron  MRN: [id number]  YOB: 1995  Date of evaluation: 2020  Time:  11:55 AM     Procedure Summary     Date: 20 Room / Location: [address]    Anesthesia Start: 1067 Anesthesia Stop: 6693    Procedure:  SECTION (N/A Abdomen) Diagnosis: Maxi Profit, mother cardiac history)    Surgeons: Antoine Andres DO Responsible Provider: Cindy Mccall MD    Anesthesia Type: spinal ASA Status: 3          Anesthesia Type: spinal    Leroy Phase I: Leroy Score: 9    Leroy Phase II:      Last vitals: Reviewed and per EMR flowsheets.      POST-OP ANESTHESIA NOTE       /72   Pulse 89   Temp 97.9 °F (36.6 °C)   Resp 21   Ht 5' 4\" (1.626 m)   Wt 256 lb 6.3 oz (116.3 kg)   LMP 2020 (Exact Date)   SpO2 98%   Breastfeeding Unknown   BMI 44.01 kg/m²    Pain Assessment: 0-10  Pain Level: 6         Anesthesia Post Evaluation    Patient location during evaluation: PACU  Patient participation: complete - patient participated  Level of consciousness: awake  Pain score: 6  Airway patency: patent  Nausea & Vomiting: no vomiting and no nausea  Complications: no  Cardiovascular status: hemodynamically stable  Respiratory status: acceptable  Hydration status: stable

## 2020-12-28 NOTE — PROGRESS NOTES
OB/GYN PROGRESS NOTE    Judith Arredondo is a 30 y.o. female  at 27w4d, Hospital Day: 28    Subjective:   Patient has been seen and examined. Patient is doing well, complaining of nothing. She is having good regular bowel movements. Patient denies any vaginal discharge and any urinary complaints. The patient reports fetal movement is present, denies contractions, denies loss of fluid, denies vaginal bleeding. Patient denies headache, vision changes, nausea, vomiting, fever, chills, shortness of breath, chest pain, RUQ pain, abdominal pain, diarrhea, change in color/amount/odor of vaginal discharge, dysuria or, hematuria.      Objective:   Vitals:  Vitals:    20 1940 20 0804 20 1715 20 2330   BP: 126/65 129/67 132/75 127/70   Pulse: 103 86 96 88   Resp: 16 16 16 16   Temp:  98.1 °F (36.7 °C) 98.6 °F (37 °C) 98.4 °F (36.9 °C)   TempSrc:       Weight:       Height:             FHT: 135, moderate variability, accelerations present, no decelerations  Contractions: none    Physical Exam:  General appearance:  no apparent distress, alert and cooperative  HEENT: head atraumatic, normocephalic, moist mucous membranes, trachea midline  Neurologic:  alert, oriented, normal speech, no focal findings or movement disorder noted  Lungs:  No increased work of breathing, good air exchange, clear to auscultation bilaterally, no crackles or wheezing  Heart:  Normal apical impulse, regular rate and rhythm, normal S1 and S2, no S3 or S4, and no murmur noted    Abdomen:  soft, gravid, non-tender, no rebound, guarding, or rigidity, no RUQ or epigastric tenderness, no signs or symptoms of abruption and no signs or symptoms of chorioamnionitis  Extremities:  no calf tenderness, non edematous  Musculoskeletal: Gross strength equal and intact throughout, no gross abnormalities, range of motion normal in hips, knees, shoulders and spine  Psychiatric: Mood appropriate, normal affect   Rectal Exam: not indicated Pelvic Exam: not indicated    DATA:  Labs:     Lab Results   Component Value Date    WBC 12.3 (H) 2020    HGB 11.0 (L) 2020    HCT 34.0 (L) 2020    MCV 87.6 2020     2020     Diagnostics:             Assessment/Plan:  Miky Arredondo is a 30 y.o. female  at 35w1d IUP with Vasa Previa   - Rh negative/ Rubella non-immune/ GBS negative   - Will need MMR postpartum and Rhogam w/u PP   - No indication for GBS prophylaxis    - Rhogam: given 20   - Influenza vaccination: given 20   - Tdap vaccination: given 20   - CBC, T&S q3 days     - T&S drawn last yesterday 20, due next 20    - CBC drawn 20 and stable   - CEFM/TOCO    - MFM scan on  schedule with last being 20 as seen above as patient is scheduled for delivery 20   - IV access: R midline placed 20, L peripheral IV placed on 20   - Continue PNV, SCDs, ASA daily   - DVT prophylaxis: SCDs and Heparin 5000U BID (started on 20 and last dose @0800, 20), s/p lovenox 40 mg qd   -  section scheduled in the Main OR on 20 @0800   - NPO @MN   - Ancef/bicitra ordered    - VSS   - Cat I FHT, TOCO none   - Patient doing well without complaints   - CS consent completed and in the chart on 20   - NICU consult completed 12/3/20   - Anesthesia consulted and recommending to complete CS in the main OR    S/p celestone 20, , ,     gHTN (new dx)   - Pt denies any s/s PreE    - PreE labs WNL x2, P/C 0.12 ()   - BPs normotensive with rarely 574P systolic BP    Hx pulmonary stenosis s/p valvuloplasty   - Last Echo and visit with Peds Cardiology 20 (noted in media): mild to moderate pulmonary insufficiency stable from 2016   - Pt denies any CP, SOB   - Anesthesia consulted and recommending to complete CS in the main OR    Marginal cord insertion   - Noted on last MFM ultrasound    Anterior placental accessory lobe - Noted on last MFM ultrasound     Fetal Lagging AC (<3rd%)   - NIPT wnl   - MFM scan on , last on 20 and scheduled for delivery 20     Anemia (Hgb 11.0>10.4>>>10.6>11.0)   - Pt denies any s/s anemia   - VSS    BMI 44.80      Patient Active Problem List    Diagnosis Date Noted    Celestone 20, 2020     Priority: High     screening for fetal growth retardation using ultrasonics     33 weeks gestation of pregnancy     Placenta marginalis in third trimester     Placenta succenturiata in third trimester     Poor fetal growth complicating pregnancy, antepartum     Gestational hypertension (G1) 2020    31 weeks gestation of pregnancy 2020    Vasa previa 2020     Overview Note:     Plan for inpatient management at 32 weeks.  Marginal cord insertion 2020    Lagging AC  2020    BMI 44.80 2020    Rh neg/RNI/GBS unk 2020     Overview Note:     Intake not completed. Pt desires care with different [address] [place] [address] records/ultrasound/labs  from previous provider from [address] [place] [address].  scanned into media. Pt moved from       Hx Pulmonary Stenosis s/p Valvuloplasty  2020     Overview Note:     Dx at 2 and underwent a balloon valvuloplasty at age 1 years, performed by Jennifer Desir at [facility]  In [address] [place] [address]. Most recent visit to peds cardiology scanned into media-SK         Will update Dr. Kathie Dhillon DO  Ob/Gyn Resident  2020, 12:58 AM         Attending Physician Statement  I have discussed the care of Yeny Arredondo, including pertinent history and exam findings,  with the resident. I have reviewed the key elements of all parts of the encounter with the resident. Sharad Rodriguez agree with the assessment, plan and orders as documented by the resident. Nathaniel Land     Pt seen and examined.  Plan of care discussed in detail and all questions answered.  Denies VB, CTX, LOF.  +FM.

## 2020-12-28 NOTE — LACTATION NOTE
Mom able to apply baby on the left side no shield used for this side. Pump placed in room mom discussed pumping.

## 2020-12-28 NOTE — LACTATION NOTE
Mom placed in side lying on right side,attempted many times without shield,then applied shield x small Baby does latch baby does push and pull shield with feeding. Discussed 10 minutes per side then supplement feeding.

## 2020-12-28 NOTE — OP NOTE
longitudinally with blunt stretch, bladder retractor was placed. A low transverse uterine incision was made using a new scalpel blade. Blunt stretch on the hysterotomy incision was made and the amniotomy was performed revealing clear fluid. Delivered from cephalic presentation was a Live Born female infant. The infant was suctioned, dried and the umbilical cord was clamped and cut after thirty seconds delayed cord clamping. The infant was taken to the warmer and attended by NICU for evaluation. A second section of cord was clamped and cut and sent for gases. Cord blood was obtained for evaluation. The placenta was removed spontaneously with gentle traction and appeared intact, whole, that the umbilical cord had three vessels noted and vasa previa was noted. Pitocin was started. The uterine outline appeared normal. The uterus was exteriorized and cleaned of all clots and debris. The uterine incision was closed with running locked sutures of 0 Vicryl. The uterus was reintroduced into the abdominal cavity. Bilateral abdominal gutters were cleared of all clots and debris. Bilateral tubes and ovaries were visualized and appeared normal. The hysterotomy was again inspected and found to be hemostatic. Abdomen was copiously irrigated. Peritoneum was reapproximated with running suture of 2-0 Vicryl. Rectus muscles were inspected and found to be hemostatic. Rectus muscle were reapproximated using interrupted 2-0 Vicryl suture. The fascia was then reapproximated with running sutures of 0 Vicryl. The subcuticular space was irrigated copiously. The subcuticular space was closed using a 2-0 plain gut suture in a running fashion. The skin was reapproximated with Insorb staples. The skin was then cleansed and dressed with a silver bandage in sterile fashion. Instrument, sponge, and needle counts were correct prior the abdominal closure and at the conclusion of the case. The urine remained clear throughout the case.   Ancef was given for antibiotic prophylaxis. SCDs for DVT prophylaxis remain in place for the post operative period. Dr. Shiraz Koehler was present for the entire operation. Findings:  Viable 4 lb 5 oz female infant in cephalic presentation with Apgars of 9 at 1 minute and 9 at five minutes, normal appearing uterus tubes and ovaries, vasa previa   Estimated Blood Loss: 2000mL  Total IV Fluids: 800mL  Urine output: 150mL clear urine   Drains:  willis catheter  Specimens:  placenta sent to pathology, cord blood and cord gases  Instrument and Sponge Count: Correct  Complications: none  Condition: Infant stable, transfer to [facility], Mother stable, transfer to post anesthesia recovery     Tammie Suarez DO  OB/GYN Resident  2020, 9:30 AM    This dictation was performed by a resident physician and then was thoroughly reviewed by the attending prior to being signed. Date: 2021  Time: 11:35 AM    Patient Name: Thiago Arredondo  Patient : 1995  Room/Bed: [address] [place] [address]  Admission Date/Time: 2020  8:51 AM  MRN #: [id number]  CSN #: [id number]      Attending Attestation:   I was present and scrubbed for the entire procedure.     Attending Name:  Viji Hernandez DO

## 2020-12-29 LAB
ABSOLUTE EOS #: 0.16 K/UL (ref 0–0.44)
ABSOLUTE IMMATURE GRANULOCYTE: 0.25 K/UL (ref 0–0.3)
ABSOLUTE LYMPH #: 3.84 K/UL (ref 1.1–3.7)
ABSOLUTE MONO #: 1.06 K/UL (ref 0.1–1.2)
BASOPHILS # BLD: 0 % (ref 0–2)
BASOPHILS ABSOLUTE: 0.03 K/UL (ref 0–0.2)
DIFFERENTIAL TYPE: ABNORMAL
EOSINOPHILS RELATIVE PERCENT: 1 % (ref 1–4)
FETAL SCREEN: NORMAL
HCT VFR BLD CALC: 31.6 % (ref 36.3–47.1)
HEMOGLOBIN: 10.1 G/DL (ref 11.9–15.1)
IMMATURE GRANULOCYTES: 2 %
LYMPHOCYTES # BLD: 26 % (ref 24–43)
MCH RBC QN AUTO: 28.8 PG (ref 25.2–33.5)
MCHC RBC AUTO-ENTMCNC: 32 G/DL (ref 28.4–34.8)
MCV RBC AUTO: 90 FL (ref 82.6–102.9)
MONOCYTES # BLD: 7 % (ref 3–12)
NRBC AUTOMATED: 0 PER 100 WBC
PDW BLD-RTO: 12.9 % (ref 11.8–14.4)
PLATELET # BLD: 225 K/UL (ref 138–453)
PLATELET ESTIMATE: ABNORMAL
PMV BLD AUTO: 9.7 FL (ref 8.1–13.5)
RBC # BLD: 3.51 M/UL (ref 3.95–5.11)
RBC # BLD: ABNORMAL 10*6/UL
SEG NEUTROPHILS: 64 % (ref 36–65)
SEGMENTED NEUTROPHILS ABSOLUTE COUNT: 9.24 K/UL (ref 1.5–8.1)
WBC # BLD: 14.6 K/UL (ref 3.5–11.3)
WBC # BLD: ABNORMAL 10*3/UL

## 2020-12-29 PROCEDURE — 6360000002 HC RX W HCPCS: Performed by: STUDENT IN AN ORGANIZED HEALTH CARE EDUCATION/TRAINING PROGRAM

## 2020-12-29 PROCEDURE — 36415 COLL VENOUS BLD VENIPUNCTURE: CPT

## 2020-12-29 PROCEDURE — 6370000000 HC RX 637 (ALT 250 FOR IP): Performed by: STUDENT IN AN ORGANIZED HEALTH CARE EDUCATION/TRAINING PROGRAM

## 2020-12-29 PROCEDURE — 2580000003 HC RX 258: Performed by: STUDENT IN AN ORGANIZED HEALTH CARE EDUCATION/TRAINING PROGRAM

## 2020-12-29 PROCEDURE — 85025 COMPLETE CBC W/AUTO DIFF WBC: CPT

## 2020-12-29 PROCEDURE — 1220000000 HC SEMI PRIVATE OB R&B

## 2020-12-29 PROCEDURE — 85461 HEMOGLOBIN FETAL: CPT

## 2020-12-29 RX ADMIN — HUMAN RHO(D) IMMUNE GLOBULIN 300 MCG: 300 INJECTION, SOLUTION INTRAMUSCULAR at 18:54

## 2020-12-29 RX ADMIN — MAGNESIUM HYDROXIDE 30 ML: 400 SUSPENSION ORAL at 08:16

## 2020-12-29 RX ADMIN — IBUPROFEN 800 MG: 800 TABLET, FILM COATED ORAL at 08:16

## 2020-12-29 RX ADMIN — OXYCODONE HYDROCHLORIDE AND ACETAMINOPHEN 2 TABLET: 5; 325 TABLET ORAL at 21:34

## 2020-12-29 RX ADMIN — OXYCODONE HYDROCHLORIDE AND ACETAMINOPHEN 2 TABLET: 5; 325 TABLET ORAL at 14:01

## 2020-12-29 RX ADMIN — KETOROLAC TROMETHAMINE 30 MG: 30 INJECTION, SOLUTION INTRAMUSCULAR at 02:14

## 2020-12-29 RX ADMIN — IBUPROFEN 800 MG: 800 TABLET, FILM COATED ORAL at 16:58

## 2020-12-29 RX ADMIN — STANDARDIZED SENNA CONCENTRATE AND DOCUSATE SODIUM 1 TABLET: 8.6; 5 TABLET ORAL at 08:16

## 2020-12-29 RX ADMIN — STANDARDIZED SENNA CONCENTRATE AND DOCUSATE SODIUM 1 TABLET: 8.6; 5 TABLET ORAL at 21:33

## 2020-12-29 RX ADMIN — OXYCODONE HYDROCHLORIDE AND ACETAMINOPHEN 1 TABLET: 5; 325 TABLET ORAL at 08:16

## 2020-12-29 RX ADMIN — Medication 1 TABLET: at 08:16

## 2020-12-29 RX ADMIN — CEFAZOLIN 2 G: 10 INJECTION, POWDER, FOR SOLUTION INTRAVENOUS at 05:53

## 2020-12-29 RX ADMIN — Medication 10 ML: at 12:00

## 2020-12-29 ASSESSMENT — PAIN SCALES - GENERAL
PAINLEVEL_OUTOF10: 3
PAINLEVEL_OUTOF10: 4
PAINLEVEL_OUTOF10: 7
PAINLEVEL_OUTOF10: 4
PAINLEVEL_OUTOF10: 4

## 2020-12-29 NOTE — PROGRESS NOTES
POST OPERATIVE DAY # 1    Maral Arredondo is a 30 y.o. female   This patient was seen and examined today. S/P PLTCS on 20. Her pregnancy was complicated by:   Patient Active Problem List   Diagnosis    Rh neg/RNI/GBS unk    Hx Pulmonary Stenosis s/p Valvuloplasty     Vasa previa    Marginal cord insertion    Lagging AC     BMI 44.80    Celestone 20,     Gestational hypertension (G1)    Placenta marginalis in third trimester    Placenta succenturiata in third trimester    Poor fetal growth complicating pregnancy, antepartum     screening for fetal growth retardation using ultrasonics    PLTCS 20 F Apg  Wt 4#5       Today she is doing well without any chief complaint. Her lochia is light. She denies chest pain, shortness of breath, headache, lightheadedness, blurred vision, peripheral edema and palpitations. She is  breast and bottle feeding and she denies any signs or symptoms of mastitis. She is ambulating well. She is voiding without difficulty. She currently denies S/S of postpartum depression. Flatus present. Bowel movement absent. She is tolerating solids.     Vital Signs:  Vitals:    20 1600 20 2000 20 0000 20 0400   BP: 118/68 126/72 119/71 117/79   Pulse: 76 97 97 87   Resp: 14 16 16 16   Temp: 98.1 °F (36.7 °C) 98.4 °F (36.9 °C) 99 °F (37.2 °C) 98.1 °F (36.7 °C)   TempSrc: Oral Oral Oral Oral   SpO2:  99%     Weight:       Height:           Urine Input & Output last 24hrs:     Intake/Output Summary (Last 24 hours) at 2020 7644  Last data filed at 2020 0215  Gross per 24 hour   Intake    Output 3750 ml   Net -3750 ml       Physical Exam:  General:  no apparent distress, alert and cooperative  Neurologic:  alert, oriented, normal speech, no focal findings or movement disorder noted  Lungs:  No increased work of breathing, good air exchange, clear to auscultation bilaterally, no crackles or wheezing Signs and symptoms of Mastitis were reviewed. The patient is to call if any occur for follow up. Discharge instructions including pelvic rest, incision care, 15 lb weight restriction, no driving with pain medicine and office follow-up were reviewed with patient     Attending Physician: Dr. Burt Records, DO  Ob/Gyn Resident  12/29/2020, 6:59 AM          Attending Physician Statement  I have discussed the care of Odella Kayser Minor, including pertinent history and exam findings,  with the resident. I have seen and examined the patient and the key elements of all parts of the encounter have been performed by me. I agree with the assessment, plan and orders as documented by the resident. (GC Modifier)    Pt. Seen and examined. She is feeling well. Pain controlled with medication. Her bleeding is light. She is ambulating, tolerating PO, voiding without difficulty. She denies CP/SOB/F/CH/N/V/HA.       Vitals:    12/29/20 0000 12/29/20 0400 12/29/20 0800 12/29/20 1630   BP: 119/71 117/79 120/74 120/76   Pulse: 97 87 92 87   Resp: 16 16 16 16   Temp: 99 °F (37.2 °C) 98.1 °F (36.7 °C) 98.2 °F (36.8 °C) 98.4 °F (36.9 °C)   TempSrc: Oral Oral Oral Oral   SpO2:       Weight:       Height:         Recent Results (from the past 24 hour(s))   Fetal Screen    Collection Time: 12/29/20  4:23 AM   Result Value Ref Range    Fetal Screen MASSIVE FETAL HEMORRHAGE RULED OUT    CBC auto differential    Collection Time: 12/29/20  4:23 AM   Result Value Ref Range    WBC 14.6 (H) 3.5 - 11.3 k/uL    RBC 3.51 (L) 3.95 - 5.11 m/uL    Hemoglobin 10.1 (L) 11.9 - 15.1 g/dL    Hematocrit 31.6 (L) 36.3 - 47.1 %    MCV 90.0 82.6 - 102.9 fL    MCH 28.8 25.2 - 33.5 pg    MCHC 32.0 28.4 - 34.8 g/dL    RDW 12.9 11.8 - 14.4 %    Platelets 345 012 - 963 k/uL    MPV 9.7 8.1 - 13.5 fL    NRBC Automated 0.0 0.0 per 100 WBC    Differential Type NOT REPORTED     Seg Neutrophils 64 36 - 65 %    Lymphocytes 26 24 - 43 %    Monocytes 7 3 - 12 % Eosinophils % 1 1 - 4 %    Basophils 0 0 - 2 %    Immature Granulocytes 2 (H) 0 %    Segs Absolute 9.24 (H) 1.50 - 8.10 k/uL    Absolute Lymph # 3.84 (H) 1.10 - 3.70 k/uL    Absolute Mono # 1.06 0.10 - 1.20 k/uL    Absolute Eos # 0.16 0.00 - 0.44 k/uL    Basophils Absolute 0.03 0.00 - 0.20 k/uL    Absolute Immature Granulocyte 0.25 0.00 - 0.30 k/uL    WBC Morphology NOT REPORTED     RBC Morphology NOT REPORTED     Platelet Estimate NOT REPORTED      POD#1 PLTCS, female, vasa previa  Rh neg - rhogam given  Breastfeeding, denies any s/s of mastitis  VSS  Anemia, stable, asymptomatic          [id number] [facility]  [place] [id number] [address]  [phone]      Discharge Instructions for  Birth     During a  section (), an incision is made in the abdomen and uterus (womb) to deliver the baby. The normal hospital stay is 2-4 days. Steps to Take   Home Care    · For the first 1-2 weeks, ask for someone to help you at home. · Let people help you. Take frequent rest breaks. · For vaginal bleeding, use extra absorbent pads. · Keep the incision area clean and dry. · Ask your doctor about when it is safe to shower, bathe, or soak in water. · Avoid heavy lifting for six weeks. Diet    After a  birth, you will start with a clear liquid diet. Examples include: Jell-o, broth, and ginger ale. If you tolerate that, you can slowly go back to your regular diet. Stay away from anything greasy or spicy right after surgery. These types of foods can upset your stomach. Drink lots of fluids to prevent constipation. Physical Activity    · Do not lift anything heavier than your baby. · When shifting positions, use a pillow to support the area where the incisions were made. · Get up slowly. This will help you to avoid feeling dizzy or light headed. · Try to move around each day. Light physical activity will help with your recovery. · Ask your doctor when you will be able to go back to work. · Do not drive unless your doctor has given you permission to do so. Do not drive if you are taking prescription pain medicine. · Ask your doctor when you will be able to resume sexual activity. If you have not done so already, talk to your doctor about birth control options. Medications    Your doctor may recommend pain medicine to ease discomfort. If you are taking medicines, follow these general guidelines:   · Take your medicine as directed. Do not change the amount or the schedule. · Do not stop taking them without talking to your doctor. · Do not share them. · Know what the results and side effects. Report them to your doctor. · Some drugs can be dangerous when mixed. Talk to a doctor or pharmacist if you are taking more than one drug. This includes over-the-counter medicine and herb or dietary supplements. · Plan ahead for refills so you don't run out. Lifestyle Changes    You and your doctor will plan lifestyle changes that will help you recover. To get encouragement and learn strategies, consider joining a support group for new mothers. Follow-up   Make a follow-up appointment as directed by your doctor.    Call Your Doctor If Any of the Following Occurs   After you leave the hospital, call your doctor if any of the following occurs:   · Signs of infection, including fever and chills   · Heavy vaginal bleeding   · Foul-smelling vaginal discharge   · Excessive bleeding, redness, swelling, increasing pain or discharge from the incision site   · Nausea and/or vomiting that you cannot control with the medicines you were given after surgery, or which persist for more than two days after discharge from the hospital   · Pain that you cannot control with the medicines you have been given   · Swelling and/or pain in one or both legs   · Cough, shortness of breath, or chest pain

## 2020-12-29 NOTE — CONSULTS
Baby's 24 hour OT 74, currently sleepy at breast.  Mom shown football hold, baby then placed in skin to skin.

## 2020-12-29 NOTE — PLAN OF CARE
Problem: Healthcare acquired conditions:  Goal: Absence of healthcare acquired conditions  Description: Absence of healthcare acquired conditions  Outcome: Completed     Problem: Pain:  Description: Pain management should include both nonpharmacologic and pharmacologic interventions.   Goal: Pain level will decrease  Description: Pain level will decrease  Outcome: Completed  Goal: Control of acute pain  Description: Control of acute pain  Outcome: Completed  Goal: Control of chronic pain  Description: Control of chronic pain  Outcome: Completed     Problem: Discharge Planning:  Goal: Discharged to appropriate level of care  Description: Discharged to appropriate level of care  Outcome: Ongoing     Problem: Fluid Volume - Imbalance:  Goal: Absence of postpartum hemorrhage signs and symptoms  Description: Absence of postpartum hemorrhage signs and symptoms  Outcome: Ongoing  Goal: Absence of imbalanced fluid volume signs and symptoms  Description: Absence of imbalanced fluid volume signs and symptoms  Outcome: Ongoing     Problem: Infection - Surgical Site:  Goal: Will show no infection signs and symptoms  Description: Will show no infection signs and symptoms  Outcome: Ongoing     Problem: Mood - Altered:  Goal: Mood stable  Description: Mood stable  Outcome: Ongoing     Problem: Nausea/Vomiting:  Goal: Absence of nausea/vomiting  Description: Absence of nausea/vomiting  Outcome: Ongoing     Problem: Pain - Acute:  Goal: Pain level will decrease  Description: Pain level will decrease  Outcome: Ongoing     Problem: Urinary Retention:  Goal: Urinary elimination within specified parameters  Description: Urinary elimination within specified parameters  Outcome: Ongoing     Problem: Venous Thromboembolism:  Goal: Will show no signs or symptoms of venous thromboembolism  Description: Will show no signs or symptoms of venous thromboembolism  Outcome: Ongoing  Goal: Absence of signs or symptoms of impaired coagulation Description: Absence of signs or symptoms of impaired coagulation  Outcome: Ongoing

## 2020-12-29 NOTE — LACTATION NOTE
Baby still disinterested at breast.  Formula offered d/t size/gestation. Mom pumped and drops obtained given to baby from mom's finger. Reviewed plan to offer breast every 3 hours, if poor feed, baby to have bottle and mom to pump.

## 2020-12-30 VITALS
RESPIRATION RATE: 16 BRPM | TEMPERATURE: 98.4 F | HEIGHT: 64 IN | BODY MASS INDEX: 43.77 KG/M2 | OXYGEN SATURATION: 99 % | WEIGHT: 256.39 LBS | HEART RATE: 106 BPM | DIASTOLIC BLOOD PRESSURE: 80 MMHG | SYSTOLIC BLOOD PRESSURE: 124 MMHG

## 2020-12-30 LAB
BLD PROD TYP BPU: NORMAL
DISPENSE STATUS BLOOD BANK: NORMAL
TRANSFUSION STATUS: NORMAL
UNIT DIVISION: 0
UNIT NUMBER: NORMAL

## 2020-12-30 PROCEDURE — 90707 MMR VACCINE SC: CPT | Performed by: STUDENT IN AN ORGANIZED HEALTH CARE EDUCATION/TRAINING PROGRAM

## 2020-12-30 PROCEDURE — 6370000000 HC RX 637 (ALT 250 FOR IP): Performed by: STUDENT IN AN ORGANIZED HEALTH CARE EDUCATION/TRAINING PROGRAM

## 2020-12-30 PROCEDURE — 90471 IMMUNIZATION ADMIN: CPT | Performed by: STUDENT IN AN ORGANIZED HEALTH CARE EDUCATION/TRAINING PROGRAM

## 2020-12-30 PROCEDURE — 6360000002 HC RX W HCPCS: Performed by: STUDENT IN AN ORGANIZED HEALTH CARE EDUCATION/TRAINING PROGRAM

## 2020-12-30 RX ADMIN — OXYCODONE HYDROCHLORIDE AND ACETAMINOPHEN 1 TABLET: 5; 325 TABLET ORAL at 08:33

## 2020-12-30 RX ADMIN — Medication 1 TABLET: at 08:32

## 2020-12-30 RX ADMIN — MEASLES, MUMPS, AND RUBELLA VIRUS VACCINE LIVE 0.5 ML: 1000; 12500; 1000 INJECTION, POWDER, LYOPHILIZED, FOR SUSPENSION SUBCUTANEOUS at 11:22

## 2020-12-30 RX ADMIN — OXYCODONE HYDROCHLORIDE AND ACETAMINOPHEN 1 TABLET: 5; 325 TABLET ORAL at 04:08

## 2020-12-30 RX ADMIN — IBUPROFEN 800 MG: 800 TABLET, FILM COATED ORAL at 04:08

## 2020-12-30 RX ADMIN — STANDARDIZED SENNA CONCENTRATE AND DOCUSATE SODIUM 1 TABLET: 8.6; 5 TABLET ORAL at 08:35

## 2020-12-30 ASSESSMENT — PAIN SCALES - GENERAL
PAINLEVEL_OUTOF10: 3
PAINLEVEL_OUTOF10: 5

## 2020-12-30 NOTE — PROGRESS NOTES
POST OPERATIVE DAY # 2    [place] Arredondo is a 30 y.o. female   This patient was seen and examined today. S/P PLTCS on 20. Her pregnancy was complicated by:   Patient Active Problem List   Diagnosis    Rh neg/RNI/GBS unk    Hx Pulmonary Stenosis s/p Valvuloplasty     Vasa previa    Marginal cord insertion    Lagging AC     BMI 44.80    Celestone 20,     Gestational hypertension (G1)    Placenta marginalis in third trimester    Placenta succenturiata in third trimester    Poor fetal growth complicating pregnancy, antepartum     screening for fetal growth retardation using ultrasonics    PLTCS 20 F Apg  Wt 4#5       Today she is doing well without any chief complaint. Her lochia is light. She denies chest pain, shortness of breath, headache, lightheadedness, blurred vision, peripheral edema and palpitations. She is  breast and bottle feeding and she denies any signs or symptoms of mastitis. She is ambulating well. She is voiding without difficulty. She currently denies S/S of postpartum depression. Flatus present. Bowel movement absent. She is tolerating solids. Patient reports she would like to be discharged later this morning.      Vital Signs:  Vitals:    20 0800 20 1630 20 2134 20 0500   BP: 120/74 120/76 116/76 112/72   Pulse: 92 87 89 99   Resp: 16 16 17 16   Temp: 98.2 °F (36.8 °C) 98.4 °F (36.9 °C) 98.3 °F (36.8 °C) 98.2 °F (36.8 °C)   TempSrc: Oral Oral Oral Oral   SpO2:       Weight:       Height:           Urine Input & Output last 24hrs:   No intake or output data in the 24 hours ending 20 0612    Physical Exam:  General:  no apparent distress, alert and cooperative  Neurologic:  alert, oriented, normal speech, no focal findings or movement disorder noted  Lungs:  No increased work of breathing, good air exchange, clear to auscultation bilaterally, no crackles or wheezing  Heart:  regular rate and rhythm Abdomen: abdomen soft, non-distended, non-tender  Fundus: non-tender, normal size, firm, below umbilicus  Incision: Silver dressing in place, C/D/I  Extremities:  no calf tenderness, non edematous    Labs:  Lab Results   Component Value Date    WBC 14.6 (H) 2020    HGB 10.1 (L) 2020    HCT 31.6 (L) 2020    MCV 90.0 2020     2020       Assessment/Plan:  1. Abi Arredondo is a  POD # 2 s/p PLTCS on 20   - Doing well, VSS    - Female infant in [address]   - Encourage ambulation and use of incentive spirometer   - CBC completed, Hgb stable at 10.1   - Pain control: Percocet/Motrin   - Antibiotic prophylaxis: Ancef x 24 hours   - Diet: general   - Silver dressing w/ Insorbs   - DVT prophylaxis: SCDs  2. Rh negative/Rubella non-immune   - Rhogam given 20   - MMR postpartum   3. Breast feeding    - Denies s/s mastitis   4. Gestational Hypertension    - Blood pressures stable overnight, no severe range blood pressures   - PreE labs wnl x2, P/C 0.12 ()    - Denies s/s preE   5. Hx Pulmonary Stenosis s/p Valvuloplasty    - Patient with a history of congenital pulmonary stenosis s/p balloon valvuloplasty at 1years old              - ECHO 20: mild to moderate pulmonary insufficiency, stable from 2016              - Patient evaluated by Cardiology and recommended follow up in 3 years  6. Anemia    - Hgb 11.0>>10.9>10.6>11.0 pre-operatively    - EBL 2000 mL intraoperatively    - Hgb 10.1 on POD #1   - Clinically asymptomatic    7. Continue post-op care  8. Anticipate discharge home later this morning     Counseling Completed:  Secondary Smoke risks and Sudden Infant Death Syndrome were reviewed with recommendations. Infant sleeping, \"back to sleep\" and avoidance of co-sleeping recommendations were reviewed. Signs and Symptoms of Post Partum Depression were reviewed. The patient is to call if any occur. Signs and symptoms of Mastitis were reviewed. The patient is to call if any occur for follow up.   Discharge instructions including pelvic rest, incision care, 15 lb weight restriction, no driving with pain medicine and office follow-up were reviewed with patient     Attending Physician: Dr. Glen Irving DO  Ob/Gyn Resident  12/30/2020, 6:12 AM

## 2020-12-30 NOTE — LACTATION NOTE
Outpt appt scheduled for pt to see IBCLC on 1/5/2020 at 2pm. Instructions for appt given to pt, verbalized understanding.

## 2020-12-30 NOTE — PROGRESS NOTES
CLINICAL PHARMACY NOTE: MEDS TO [address] Select Patient?: No  Total # of Prescriptions Filled: 3   The following medications were delivered to the patient:  · carlos-jael  · Motrin  · percocet  Total # of Interventions Completed: 0  Time Spent (min): 0    Additional Documentation:

## 2020-12-30 NOTE — LACTATION NOTE
Per pt offering breast/pumping plan going well. Pt reports 1-2 minutes at breast, tired and sleepy, then she is pumping and supplementing with bottle. Encouraged pt to make appt prior to discharge with lactation to assist with getting baby to breast and revise feeding plan. Pt says she will call lactation for appt if she doesn't make one prior to discharge.

## 2020-12-30 NOTE — FLOWSHEET NOTE
I have reviewed all [facility] Post-Birth Warning Signs and essential teaching points for pulmonary embolism, cardiac disease, hypertensive disorders of pregnancy, obstetric hemorrhage, venous thromboembolism, infection, and postpartum depression with the patient loren . I have informed the patient on when to call their healthcare provider and when to call 911. I have discussed with the patient  the importance of scheduling a follow-up visit with their physician, nurse practitioner or midwife and provided them with correct contact information for appointment. I have provided the patient with a copy of the \"Save Your Life\" handout. The patient has acknowledged receiving and understanding this education with her signature.

## 2020-12-30 NOTE — PLAN OF CARE
Problem: Discharge Planning:  Goal: Discharged to appropriate level of care  Description: Discharged to appropriate level of care  Outcome: Met This Shift     Problem: Fluid Volume - Imbalance:  Goal: Absence of postpartum hemorrhage signs and symptoms  Description: Absence of postpartum hemorrhage signs and symptoms  Outcome: Met This Shift  Goal: Absence of imbalanced fluid volume signs and symptoms  Description: Absence of imbalanced fluid volume signs and symptoms  Outcome: Met This Shift     Problem: Infection - Surgical Site:  Goal: Will show no infection signs and symptoms  Description: Will show no infection signs and symptoms  Outcome: Met This Shift     Problem: Mood - Altered:  Goal: Mood stable  Description: Mood stable  Outcome: Met This Shift     Problem: Nausea/Vomiting:  Goal: Absence of nausea/vomiting  Description: Absence of nausea/vomiting  Outcome: Met This Shift     Problem: Pain - Acute:  Goal: Pain level will decrease  Description: Pain level will decrease  Outcome: Met This Shift     Problem: Urinary Retention:  Goal: Urinary elimination within specified parameters  Description: Urinary elimination within specified parameters  Outcome: Met This Shift     Problem: Venous Thromboembolism:  Goal: Will show no signs or symptoms of venous thromboembolism  Description: Will show no signs or symptoms of venous thromboembolism  Outcome: Met This Shift  Goal: Absence of signs or symptoms of impaired coagulation  Description: Absence of signs or symptoms of impaired coagulation  Outcome: Met This Shift

## 2020-12-31 LAB — SURGICAL PATHOLOGY REPORT: NORMAL

## 2021-01-04 ENCOUNTER — OFFICE VISIT (OUTPATIENT)
Dept: OBGYN CLINIC | Age: 26
End: 2021-01-04

## 2021-01-04 DIAGNOSIS — Z98.890 POST-OPERATIVE STATE: Primary | ICD-10-CM

## 2021-01-04 PROCEDURE — 99024 POSTOP FOLLOW-UP VISIT: CPT | Performed by: OBSTETRICS & GYNECOLOGY

## 2021-01-04 NOTE — PROGRESS NOTES
Patient was here for a bandage removal following csection delivery. Her incision was cleaned with peroxide, looked great. No open/drainage/redness. Steri stripes was placed on incision. She was instructed to cleans area in shower and keep dry. She will return in 1 week for post partum either in person or virtual.     All questions answered.

## 2021-01-05 NOTE — LACTATION NOTE
Mom in for a latch attempt, baby was born at 27 weeks and is now 11 days old. Currently unable to sustain suck at breast for more than 1 minute. Mom offering breast at almost every feed and pumping after. Concerned about her volume, pumping every 2-3 hours getting 20 to 50 mls per pump with highest volume in morning. Baby weight today is 4 lbs in diaper and onsie. Baby alert, latched briefly with chewing motion and stopped after several sucks. Applied 16 mm shield, latched but unable to sustain suck and fell asleep. Oral exam with possible posterior tie. Mom pumped 15 ml and gave with formula. Advised mom to keep breastfeeding attempts to 2-3 times a day in order not to tire the baby out and continue to pump every 2-3 hours with no more than a 4-5 hour break during the night. List of lactogenic foods and fenugreek information provided, will call for follow up appointment in two weeks.

## 2021-01-11 ENCOUNTER — POSTPARTUM VISIT (OUTPATIENT)
Dept: OBGYN CLINIC | Age: 26
End: 2021-01-11

## 2021-01-11 VITALS
WEIGHT: 246.38 LBS | RESPIRATION RATE: 16 BRPM | DIASTOLIC BLOOD PRESSURE: 72 MMHG | BODY MASS INDEX: 42.29 KG/M2 | TEMPERATURE: 97 F | SYSTOLIC BLOOD PRESSURE: 118 MMHG

## 2021-01-11 DIAGNOSIS — Z98.890 POST-OPERATIVE STATE: Primary | ICD-10-CM

## 2021-01-11 PROCEDURE — 99024 POSTOP FOLLOW-UP VISIT: CPT | Performed by: OBSTETRICS & GYNECOLOGY

## 2021-01-11 NOTE — PROGRESS NOTES
Quynh Pulido Minor  2:40 PM  21    The patient was seen. She has no chief complaints today. She delivered by  section on 20. She is  breast feeding and there is not any signs or symptoms of mastitis. The patient completed the E.P.D.S. Evaluation form and scored 1. She does not have any signs or symptoms of post partum depression. She denies any suicidal thoughts with a plan, intent to harm others and delusional ideas. Today her lochia is light she denies any dizziness or shortness of breath. Her pregnancy was complicated by:   Patient Active Problem List    Diagnosis Date Noted    Celestone 20, 2020     Priority: High    PLTCS 20 F Apg  Wt 4#5 2020     screening for fetal growth retardation using ultrasonics     Placenta marginalis in third trimester     Placenta succenturiata in third trimester     Poor fetal growth complicating pregnancy, antepartum     Gestational hypertension (G1) 2020    Vasa previa 2020     Overview Note:     Plan for inpatient management at 32 weeks.  Marginal cord insertion 2020    Lagging AC  2020    BMI 44.80 2020    Rh neg/RNI/GBS unk 2020     Overview Note:     Intake not completed. Pt desires care with different [address] [place] [address] records/ultrasound/labs  from previous provider from [address] [place] [address].  scanned into media. Pt moved from       Hx Pulmonary Stenosis s/p Valvuloplasty  2020     Overview Note:     Dx at 2 and underwent a balloon valvuloplasty at age 1 years, performed by Rosangela Renee at [facility]  In [address] [place] [address]. Most recent visit to peds cardiology scanned into media-SK           She does admit to having good home support. Her bowels are regular and she denies any urinary tract symptomology.     OB History    Para Term  AB Living   1 1 0 1 0 1   SAB TAB Ectopic Molar Multiple Live Births   0 0 0 0 0 1           Blood pressure 118/72, temperature 97 °F (36.1 °C), temperature source Temporal, resp. rate 16, weight 246 lb 6 oz (111.8 kg), last menstrual period 04/26/2020, currently breastfeeding. Abdomen: Soft and non-tender; good bowel sounds; no guarding, rebound or rigidity; no CVA tenderness bilaterally. Incision: Clean, Dry and Intact without signs or symptoms of infection. Extremities: No calf tenderness bilaterally. DTR 2/4 bilaterally. No edema. Assessment:   Diagnosis Orders   1. PLTCS 12/28/20 F Apg 9/9 Wt 4#5       Chief Complaint   Patient presents with    Postpartum Care     EPDS Score of 1    Plan:  1. Return to the office in  3-4 weeks  2. Signs & Symptoms of mastitis reviewed; notify if occurs  3. Secondary smoke risks reviewed. Increased risks of respiratory problems, Sudden     infant death syndrome, and potential malignancies. 4. Abstinence  5. Family planning counseling and STD counseling completed -[facility]  6. Continue with post operative restrictions  7. No lifting or [place]  8. Patient was seen with total face to face time of 20 minutes. More than 50% of this visit was on counseling and education regarding her    Diagnosis Orders   1. PLTCS 12/28/20 F Apg 9/9 Wt 4#5      and her options. She was also counseled on her preventative health maintenance recommendations and follow-up.

## 2021-01-27 PROBLEM — Z98.890 POST-OPERATIVE STATE: Status: RESOLVED | Noted: 2020-12-28 | Resolved: 2021-01-27

## 2021-02-08 ENCOUNTER — POSTPARTUM VISIT (OUTPATIENT)
Dept: OBGYN CLINIC | Age: 26
End: 2021-02-08

## 2021-02-08 VITALS — BODY MASS INDEX: 43.43 KG/M2 | WEIGHT: 253 LBS | DIASTOLIC BLOOD PRESSURE: 62 MMHG | SYSTOLIC BLOOD PRESSURE: 110 MMHG

## 2021-02-08 DIAGNOSIS — Z98.890 POST-OPERATIVE STATE: Primary | ICD-10-CM

## 2021-02-08 PROCEDURE — 0503F POSTPARTUM CARE VISIT: CPT | Performed by: OBSTETRICS & GYNECOLOGY

## 2021-02-08 NOTE — PROGRESS NOTES
Yeny Arredondo  8:39 AM  21            The patient was seen. She has no chief complaints today. She delivered by  section on 21. She is  breast feeding and there is not any signs or symptoms of mastitis. She is concerned about production. She is staying hydrated, eating protein and has done everything she can at home. She does not have any signs or symptoms of post partum depression. She denies any suicidal thoughts with a plan, intent to harm others and delusional ideas. Today her lochia is light she denies any dizziness or shortness of breath. Her pregnancy was complicated by:   Patient Active Problem List    Diagnosis Date Noted    Celestone 20, 2020     Priority: High     screening for fetal growth retardation using ultrasonics     Placenta marginalis in third trimester     Placenta succenturiata in third trimester     Poor fetal growth complicating pregnancy, antepartum     Gestational hypertension (G1) 2020    Vasa previa 2020     Overview Note:     Plan for inpatient management at 32 weeks.  Marginal cord insertion 2020    Lagging AC  2020    BMI 44.80 2020    Rh neg/RNI/GBS unk 2020     Overview Note:     Intake not completed. Pt desires care with different [facility] records/ultrasound/labs  from previous provider from [address] [place] [address].  scanned into media. Pt moved from       Hx Pulmonary Stenosis s/p Valvuloplasty  2020     Overview Note:     Dx at 2 and underwent a balloon valvuloplasty at age 1 years, performed by Tiesha Lopez at [facility]  In [address] [place] [address]. Most recent visit to peds cardiology scanned into media-SK           She does admit to having good home support. Her bowels are regular and she denies any urinary tract symptomology.     OB History    Para Term  AB Living   1 1 0 1 0 1   SAB TAB Ectopic Molar Multiple Live Births   0 0 0 0 0 1       Blood pressure 110/62, weight 253 lb (114.8 kg), last menstrual period 04/26/2020, currently breastfeeding. Abdomen: Soft and non-tender; good bowel sounds; no guarding, rebound or rigidity; no CVA tenderness bilaterally. Incision: Clean, Dry and Intact without signs or symptoms of infection. Extremities: No calf tenderness bilaterally. DTR 2/4 bilaterally. No edema. Assessment:   Diagnosis Orders   1. PLTCS 12/28/20 F Apg 9/9 Wt 4#5       Chief Complaint   Patient presents with    Postpartum Care       Plan:  1. Return to the office for annual exam  2. Signs & Symptoms of mastitis reviewed; notify if occurs  3. Secondary smoke risks reviewed. Increased risks of respiratory problems, Sudden     infant death syndrome, and potential malignancies. 4. Restrictions lifted  5. Family planning counseling and STD counseling completed - NFP  6. Low breast milk production - recommend Fenugreek, oxytocin nasal spray also ordered. Patient was seen with total face to face time of 20 minutes. More than 50% of this visit was on counseling and education regarding her    Diagnosis Orders   1. PLTCS 12/28/20 F Apg 9/9 Wt 4#5      and her options. She was also counseled on her preventative health maintenance recommendations and follow-up.

## 2021-04-21 ENCOUNTER — OFFICE VISIT (OUTPATIENT)
Dept: PRIMARY CARE CLINIC | Age: 26
End: 2021-04-21
Payer: COMMERCIAL

## 2021-04-21 VITALS
DIASTOLIC BLOOD PRESSURE: 77 MMHG | BODY MASS INDEX: 44.39 KG/M2 | HEIGHT: 64 IN | SYSTOLIC BLOOD PRESSURE: 122 MMHG | WEIGHT: 260 LBS | HEART RATE: 63 BPM | OXYGEN SATURATION: 99 % | RESPIRATION RATE: 20 BRPM

## 2021-04-21 DIAGNOSIS — Z13.1 SCREENING FOR DIABETES MELLITUS: ICD-10-CM

## 2021-04-21 DIAGNOSIS — Z13.6 ENCOUNTER FOR LIPID SCREENING FOR CARDIOVASCULAR DISEASE: ICD-10-CM

## 2021-04-21 DIAGNOSIS — Z13.0 SCREENING FOR DEFICIENCY ANEMIA: ICD-10-CM

## 2021-04-21 DIAGNOSIS — Z13.29 SCREENING FOR THYROID DISORDER: ICD-10-CM

## 2021-04-21 DIAGNOSIS — Z00.00 ANNUAL PHYSICAL EXAM: Primary | ICD-10-CM

## 2021-04-21 DIAGNOSIS — E55.9 VITAMIN D DEFICIENCY: ICD-10-CM

## 2021-04-21 DIAGNOSIS — E53.8 VITAMIN B 12 DEFICIENCY: ICD-10-CM

## 2021-04-21 DIAGNOSIS — Z13.220 ENCOUNTER FOR LIPID SCREENING FOR CARDIOVASCULAR DISEASE: ICD-10-CM

## 2021-04-21 PROCEDURE — 99385 PREV VISIT NEW AGE 18-39: CPT | Performed by: NURSE PRACTITIONER

## 2021-04-21 ASSESSMENT — ENCOUNTER SYMPTOMS
SINUS PAIN: 0
COUGH: 0
SORE THROAT: 0
EYE REDNESS: 0
VOMITING: 0
EYE ITCHING: 0
DIARRHEA: 0
NAUSEA: 0
SINUS PRESSURE: 0
SHORTNESS OF BREATH: 0
CHEST TIGHTNESS: 0
ABDOMINAL PAIN: 0
EYE DISCHARGE: 0
TROUBLE SWALLOWING: 0
WHEEZING: 0

## 2021-04-21 ASSESSMENT — PATIENT HEALTH QUESTIONNAIRE - PHQ9: SUM OF ALL RESPONSES TO PHQ QUESTIONS 1-9: 0

## 2021-04-21 NOTE — PROGRESS NOTES
[address] PRIMARY CARE  [address]  [address] [address]  Dept: [phone]  Dept Fax: [phone]    Loan Arredondo is a 30 y.o. female who presents today for her medical conditions/complaintsas noted below. Loan Arredondo is c/o of New Patient ([facility]. No concerns at this time.)        HPI:     Pt presents to establish care  bp stable  Weight stable    Patient presents to [facility]. Moved here about a year ago from [place]. She is post partum. She has a baby girl, delivered on 2020.   . plencenta previa. No complications. Cleared at 6 weeks. Breastfeeding once a day. Adjusting well at home. Still on maternity leave. Ob/gyn is dr. Dakotah Marquez back in may. 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 She works from home  .  is a     Denies any other concerns. Past Medical History:   Diagnosis Date    Pulmonary valve stenosis     age 1       Past Surgical History:   Procedure Laterality Date    CARDIAC CATHETERIZATION      x3 different     CARDIAC VALVE SURGERY      age 1- Balloon valvuoplasty      SECTION N/A 2020     SECTION performed by Denise Patricia DO at [address] IR NONTUNNELED VASCULAR CATHETER  2020    IR NONTUNNELED VASCULAR CATHETER 2020 [facility]    VULVA SURGERY      x2        Family History   Problem Relation Age of Onset    Other Mother         Lupus       Social History     Tobacco Use    Smoking status: Never Smoker    Smokeless tobacco: Never Used   Substance Use Topics    Alcohol use: Not Currently      Current Outpatient Medications   Medication Sig Dispense Refill    ibuprofen (ADVIL;MOTRIN) 600 MG tablet Take 1 tablet by mouth every 6 hours as needed for Pain 30 tablet 0    Misc.  Devices (BREAST PUMP) MISC Double electric breast pump 1 each 0    Prenatal Vit-Fe Fumarate-FA (PRENATAL PO) Take by mouth       No current facility-administered medications for this visit. No Known Allergies    Health Maintenance   Topic Date Due    Hepatitis C screen  Never done    Varicella vaccine (1 of 2 - 2-dose childhood series) Never done    HIV screen  Never done    COVID-19 Vaccine (1) Never done    Cervical cancer screen  Never done    Potassium monitoring  12/17/2021    Creatinine monitoring  12/17/2021    DTaP/Tdap/Td vaccine (2 - Td) 11/24/2030    Flu vaccine  Completed    Hepatitis A vaccine  Aged Out    Hepatitis B vaccine  Aged Out    Hib vaccine  Aged Out    Meningococcal (ACWY) vaccine  Aged Out    Pneumococcal 0-64 years Vaccine  Aged Out       :     Review of Systems   Constitutional: Negative for chills, fatigue and fever. HENT: Negative for ear discharge, ear pain, sinus pressure, sinus pain, sore throat and trouble swallowing. Eyes: Negative for discharge, redness and itching. Respiratory: Negative for cough, chest tightness, shortness of breath and wheezing. Cardiovascular: Negative for chest pain. Gastrointestinal: Negative for abdominal pain, diarrhea, nausea and vomiting. Genitourinary: Negative for difficulty urinating. Musculoskeletal: Negative for arthralgias and neck pain. Skin: Negative for rash. Neurological: Negative for dizziness, weakness, light-headedness and headaches. All other systems reviewed and are negative. Objective:     Physical Exam  Constitutional:       Appearance: Normal appearance. She is obese. HENT:      Head: Normocephalic and atraumatic. Nose: Nose normal.   Eyes:      Extraocular Movements: Extraocular movements intact. Conjunctiva/sclera: Conjunctivae normal.      Pupils: Pupils are equal, round, and reactive to light. Neck:      Musculoskeletal: Neck supple. Cardiovascular:      Rate and Rhythm: Normal rate and regular rhythm. Pulses: Normal pulses. Heart sounds: Normal heart sounds.    Pulmonary:      Effort: Pulmonary effort is normal.      Breath sounds: Normal breath sounds. Abdominal:      General: Abdomen is flat. Palpations: Abdomen is soft. Musculoskeletal: Normal range of motion. Skin:     General: Skin is warm and dry. Capillary Refill: Capillary refill takes less than 2 seconds. Neurological:      General: No focal deficit present. Mental Status: She is alert and oriented to person, place, and time. Psychiatric:         Mood and Affect: Mood normal.       /77 (Site: Left Upper Arm, Position: Sitting, Cuff Size: Large Adult)   Pulse 63   Resp 20   Ht 5' 4\" (1.626 m)   Wt 260 lb (117.9 kg)   SpO2 99%   BMI 44.63 kg/m²     Assessment:       Diagnosis Orders   1. Annual physical exam     2. Encounter for lipid screening for cardiovascular disease  Lipid Panel    Comprehensive Metabolic Panel   3. Screening for thyroid disorder  TSH with Reflex   4. Screening for deficiency anemia  CBC Auto Differential   5. Screening for diabetes mellitus  Hemoglobin A1C   6. Vitamin D deficiency  Vitamin D 25 Hydroxy   7. Vitamin B 12 deficiency  Vitamin B12 & Folate       :      Return in about 1 year (around 4/21/2022), or if symptoms worsen or fail to improve, for physical .   1. Annual physical  -willing to complete lab work. rx for labs  2.-7.) screenig  -rx for screening labs    F/u in one year for physical or PRN. Orders Placed This Encounter   Procedures    CBC Auto Differential     Standing Status:   Future     Standing Expiration Date:   4/21/2022    TSH with Reflex     Standing Status:   Future     Standing Expiration Date:   4/21/2022    Lipid Panel     Standing Status:   Future     Standing Expiration Date:   7/21/2021     Order Specific Question:   Is Patient Fasting?/# of Hours     Answer:    Fast 8-10 hours    Comprehensive Metabolic Panel     Standing Status:   Future     Standing Expiration Date:   4/21/2022    Vitamin B12 & Folate     Standing Status:   Future Standing Expiration Date:   4/21/2022    Vitamin D 25 Hydroxy     Standing Status:   Future     Standing Expiration Date:   4/21/2022    Hemoglobin A1C     Standing Status:   Future     Standing Expiration Date:   4/21/2022     No orders of the defined types were placed in this encounter. Patient given educational materials - seepatient instructions. Discussed use, benefit, and side effects of prescribed medications. All patient questions answered. Pt voiced understanding. Reviewed health maintenance. Instructed to continue current medications, diet and exercise. Patient agreedwith treatment plan. Follow up as directed.       Electronically signed by JP Reo CNP on 4/21/2021at 9:56 AM

## 2021-04-23 ENCOUNTER — HOSPITAL ENCOUNTER (OUTPATIENT)
Age: 26
Setting detail: SPECIMEN
Discharge: HOME | End: 2021-04-23
Payer: COMMERCIAL

## 2021-04-23 DIAGNOSIS — E53.8 VITAMIN B 12 DEFICIENCY: ICD-10-CM

## 2021-04-23 DIAGNOSIS — Z13.6 ENCOUNTER FOR LIPID SCREENING FOR CARDIOVASCULAR DISEASE: ICD-10-CM

## 2021-04-23 DIAGNOSIS — E55.9 VITAMIN D DEFICIENCY: ICD-10-CM

## 2021-04-23 DIAGNOSIS — Z13.29 SCREENING FOR THYROID DISORDER: ICD-10-CM

## 2021-04-23 DIAGNOSIS — Z13.0 SCREENING FOR DEFICIENCY ANEMIA: ICD-10-CM

## 2021-04-23 DIAGNOSIS — Z13.220 ENCOUNTER FOR LIPID SCREENING FOR CARDIOVASCULAR DISEASE: ICD-10-CM

## 2021-04-23 DIAGNOSIS — Z13.1 SCREENING FOR DIABETES MELLITUS: ICD-10-CM

## 2021-04-23 LAB
ABSOLUTE EOS #: 0.09 K/UL (ref 0–0.44)
ABSOLUTE IMMATURE GRANULOCYTE: <0.03 K/UL (ref 0–0.3)
ABSOLUTE LYMPH #: 2.31 K/UL (ref 1.1–3.7)
ABSOLUTE MONO #: 0.4 K/UL (ref 0.1–1.2)
ALBUMIN SERPL-MCNC: 4.1 G/DL (ref 3.5–5.2)
ALBUMIN/GLOBULIN RATIO: 1.5 (ref 1–2.5)
ALP BLD-CCNC: 60 U/L (ref 35–104)
ALT SERPL-CCNC: 10 U/L (ref 5–33)
ANION GAP SERPL CALCULATED.3IONS-SCNC: 11 MMOL/L (ref 9–17)
AST SERPL-CCNC: 13 U/L
BASOPHILS # BLD: 1 % (ref 0–2)
BASOPHILS ABSOLUTE: 0.03 K/UL (ref 0–0.2)
BILIRUB SERPL-MCNC: 0.72 MG/DL (ref 0.3–1.2)
BUN BLDV-MCNC: 12 MG/DL (ref 6–20)
BUN/CREAT BLD: ABNORMAL (ref 9–20)
CALCIUM SERPL-MCNC: 9.1 MG/DL (ref 8.6–10.4)
CHLORIDE BLD-SCNC: 103 MMOL/L (ref 98–107)
CHOLESTEROL/HDL RATIO: 2.6
CHOLESTEROL: 132 MG/DL
CO2: 25 MMOL/L (ref 20–31)
CREAT SERPL-MCNC: 0.51 MG/DL (ref 0.5–0.9)
DIFFERENTIAL TYPE: ABNORMAL
EOSINOPHILS RELATIVE PERCENT: 2 % (ref 1–4)
ESTIMATED AVERAGE GLUCOSE: 97 MG/DL
FOLATE: >20 NG/ML
GFR AFRICAN AMERICAN: >60 ML/MIN
GFR NON-AFRICAN AMERICAN: >60 ML/MIN
GFR SERPL CREATININE-BSD FRML MDRD: ABNORMAL ML/MIN/{1.73_M2}
GFR SERPL CREATININE-BSD FRML MDRD: ABNORMAL ML/MIN/{1.73_M2}
GLUCOSE BLD-MCNC: 66 MG/DL (ref 70–99)
HBA1C MFR BLD: 5 % (ref 4–6)
HCT VFR BLD CALC: 41.8 % (ref 36.3–47.1)
HDLC SERPL-MCNC: 51 MG/DL
HEMOGLOBIN: 12.9 G/DL (ref 11.9–15.1)
IMMATURE GRANULOCYTES: 0 %
LDL CHOLESTEROL: 51 MG/DL (ref 0–130)
LYMPHOCYTES # BLD: 39 % (ref 24–43)
MCH RBC QN AUTO: 27.7 PG (ref 25.2–33.5)
MCHC RBC AUTO-ENTMCNC: 30.9 G/DL (ref 28.4–34.8)
MCV RBC AUTO: 89.9 FL (ref 82.6–102.9)
MONOCYTES # BLD: 7 % (ref 3–12)
NRBC AUTOMATED: 0 PER 100 WBC
PDW BLD-RTO: 15.5 % (ref 11.8–14.4)
PLATELET # BLD: 261 K/UL (ref 138–453)
PLATELET ESTIMATE: ABNORMAL
PMV BLD AUTO: 10.7 FL (ref 8.1–13.5)
POTASSIUM SERPL-SCNC: 4.1 MMOL/L (ref 3.7–5.3)
RBC # BLD: 4.65 M/UL (ref 3.95–5.11)
RBC # BLD: ABNORMAL 10*6/UL
SEG NEUTROPHILS: 51 % (ref 36–65)
SEGMENTED NEUTROPHILS ABSOLUTE COUNT: 3.15 K/UL (ref 1.5–8.1)
SODIUM BLD-SCNC: 139 MMOL/L (ref 135–144)
TOTAL PROTEIN: 6.9 G/DL (ref 6.4–8.3)
TRIGL SERPL-MCNC: 148 MG/DL
TSH SERPL DL<=0.05 MIU/L-ACNC: 3.26 MIU/L (ref 0.3–5)
VITAMIN B-12: 497 PG/ML (ref 232–1245)
VITAMIN D 25-HYDROXY: 25.4 NG/ML (ref 30–100)
VLDLC SERPL CALC-MCNC: NORMAL MG/DL (ref 1–30)
WBC # BLD: 6 K/UL (ref 3.5–11.3)
WBC # BLD: ABNORMAL 10*3/UL

## 2021-04-26 RX ORDER — MELATONIN
1000 DAILY
Qty: 30 TABLET | Refills: 5 | Status: SHIPPED | OUTPATIENT
Start: 2021-04-26

## 2021-08-09 ENCOUNTER — TELEPHONE (OUTPATIENT)
Dept: PRIMARY CARE CLINIC | Age: 26
End: 2021-08-09

## 2021-08-09 DIAGNOSIS — R10.11 RUQ PAIN: Primary | ICD-10-CM

## 2021-08-13 ENCOUNTER — HOSPITAL ENCOUNTER (OUTPATIENT)
Dept: ULTRASOUND IMAGING | Facility: CLINIC | Age: 26
Discharge: HOME | End: 2021-08-15
Payer: COMMERCIAL

## 2021-08-13 DIAGNOSIS — K80.20 GALLSTONES: Primary | ICD-10-CM

## 2021-08-13 DIAGNOSIS — R10.11 RUQ PAIN: ICD-10-CM

## 2021-08-13 PROCEDURE — 76705 ECHO EXAM OF ABDOMEN: CPT

## 2021-08-16 ENCOUNTER — OFFICE VISIT (OUTPATIENT)
Dept: PRIMARY CARE CLINIC | Age: 26
End: 2021-08-16
Payer: COMMERCIAL

## 2021-08-16 VITALS
BODY MASS INDEX: 45.18 KG/M2 | SYSTOLIC BLOOD PRESSURE: 116 MMHG | DIASTOLIC BLOOD PRESSURE: 64 MMHG | HEART RATE: 98 BPM | OXYGEN SATURATION: 99 % | RESPIRATION RATE: 14 BRPM | WEIGHT: 263.2 LBS

## 2021-08-16 DIAGNOSIS — K80.20 GALLSTONES: Primary | ICD-10-CM

## 2021-08-16 PROCEDURE — 99213 OFFICE O/P EST LOW 20 MIN: CPT | Performed by: NURSE PRACTITIONER

## 2021-08-16 SDOH — ECONOMIC STABILITY: FOOD INSECURITY: WITHIN THE PAST 12 MONTHS, THE FOOD YOU BOUGHT JUST DIDN'T LAST AND YOU DIDN'T HAVE MONEY TO GET MORE.: NEVER TRUE

## 2021-08-16 SDOH — ECONOMIC STABILITY: FOOD INSECURITY: WITHIN THE PAST 12 MONTHS, YOU WORRIED THAT YOUR FOOD WOULD RUN OUT BEFORE YOU GOT MONEY TO BUY MORE.: NEVER TRUE

## 2021-08-16 ASSESSMENT — ENCOUNTER SYMPTOMS
EYE ITCHING: 0
ABDOMINAL PAIN: 1
NAUSEA: 0
COUGH: 0
TROUBLE SWALLOWING: 0
SORE THROAT: 0
WHEEZING: 0
EYE REDNESS: 0
EYE DISCHARGE: 0
VOMITING: 0
CHEST TIGHTNESS: 0
SHORTNESS OF BREATH: 0
SINUS PRESSURE: 0
DIARRHEA: 0
SINUS PAIN: 0

## 2021-08-16 ASSESSMENT — PATIENT HEALTH QUESTIONNAIRE - PHQ9
SUM OF ALL RESPONSES TO PHQ QUESTIONS 1-9: 0
2. FEELING DOWN, DEPRESSED OR HOPELESS: 0
SUM OF ALL RESPONSES TO PHQ9 QUESTIONS 1 & 2: 0
1. LITTLE INTEREST OR PLEASURE IN DOING THINGS: 0

## 2021-08-16 ASSESSMENT — SOCIAL DETERMINANTS OF HEALTH (SDOH): HOW HARD IS IT FOR YOU TO PAY FOR THE VERY BASICS LIKE FOOD, HOUSING, MEDICAL CARE, AND HEATING?: NOT HARD AT ALL

## 2021-08-16 NOTE — PROGRESS NOTES
[address] PRIMARY CARE  [address]  [address]   Dept: [phone]  Dept Fax: [phone]    Latricia Epley is a 27 y.o. female who presents today for her medical conditions/complaintsas noted below. Latricia Epley is c/o of Follow-up (ED f/u for gallstones)        HPI:     Pt presents for a ED follow up  bp stable  Weight stable    Pt presents for an ED follow up. She was in the ED on  with c/o RUQ pain. She had a similar episode before but went away after about 30-45 min. This time it lasted over 2 hours. They did lab testing and found that her LFT's were elevated. They were unable to do an US at night. She had an US completed prior to appointment that shows gallstones. She has an appointment with dr. Terris Canavan on . She did find out she is pregnant. She is approximately 5 weeks. Her first OB appointment is on . She has a 11 month old daughter. She had an attack the other day while eating a salad. She is unsure if the dressing was fatty or oily.        Past Medical History:   Diagnosis Date    Pulmonary valve stenosis     age 1       Past Surgical History:   Procedure Laterality Date    CARDIAC CATHETERIZATION      x3 different     CARDIAC VALVE SURGERY      age 1- Balloon valvuoplasty      SECTION N/A 2020     SECTION performed by Abigail Schmidt DO at [address] IR NONTUNNELED VASCULAR CATHETER  2020    IR NONTUNNELED VASCULAR CATHETER 2020 [facility]    VULVA SURGERY      x2        Family History   Problem Relation Age of Onset    Other Mother         Lupus       Social History     Tobacco Use    Smoking status: Never Smoker    Smokeless tobacco: Never Used   Substance Use Topics    Alcohol use: Not Currently      Current Outpatient Medications   Medication Sig Dispense Refill    vitamin D3 (CHOLECALCIFEROL) 25 MCG (1000 UT) TABS tablet Take 1 tablet by mouth daily 30 tablet 5  ibuprofen (ADVIL;MOTRIN) 600 MG tablet Take 1 tablet by mouth every 6 hours as needed for Pain 30 tablet 0    Misc. Devices (BREAST PUMP) MISC Double electric breast pump 1 each 0    Prenatal Vit-Fe Fumarate-FA (PRENATAL PO) Take by mouth       No current facility-administered medications for this visit. No Known Allergies    Health Maintenance   Topic Date Due    Hepatitis C screen  Never done    Varicella vaccine (1 of 2 - 2-dose childhood series) Never done    COVID-19 Vaccine (1) Never done    HIV screen  Never done    Cervical cancer screen  Never done    Flu vaccine (1) 09/01/2021    Potassium monitoring  04/23/2022    Creatinine monitoring  04/23/2022    DTaP/Tdap/Td vaccine (2 - Td or Tdap) 11/24/2030    Hepatitis A vaccine  Aged Out    Hepatitis B vaccine  Aged Out    Hib vaccine  Aged Out    Meningococcal (ACWY) vaccine  Aged Out    Pneumococcal 0-64 years Vaccine  Aged Out       :     Review of Systems   Constitutional: Negative for chills, fatigue and fever. HENT: Negative for ear discharge, ear pain, sinus pressure, sinus pain, sore throat and trouble swallowing. Eyes: Negative for discharge, redness and itching. Respiratory: Negative for cough, chest tightness, shortness of breath and wheezing. Cardiovascular: Negative for chest pain. Gastrointestinal: Positive for abdominal pain. Negative for diarrhea, nausea and vomiting. Genitourinary: Negative for difficulty urinating. Musculoskeletal: Negative for arthralgias and neck pain. Skin: Negative for rash. Neurological: Negative for dizziness, weakness, light-headedness and headaches. All other systems reviewed and are negative. Objective:     Physical Exam  Constitutional:       Appearance: Normal appearance. She is obese. HENT:      Head: Normocephalic and atraumatic. Nose: Nose normal.   Eyes:      Extraocular Movements: Extraocular movements intact.       Conjunctiva/sclera: Conjunctivae normal. Pupils: Pupils are equal, round, and reactive to light. Cardiovascular:      Rate and Rhythm: Normal rate and regular rhythm. Pulses: Normal pulses. Heart sounds: Normal heart sounds. Pulmonary:      Effort: Pulmonary effort is normal.      Breath sounds: Normal breath sounds. Abdominal:      General: Abdomen is flat. Palpations: Abdomen is soft. Tenderness: There is no abdominal tenderness. Musculoskeletal:         General: Normal range of motion. Cervical back: Neck supple. Skin:     General: Skin is warm and dry. Capillary Refill: Capillary refill takes less than 2 seconds. Neurological:      General: No focal deficit present. Mental Status: She is alert and oriented to person, place, and time. Psychiatric:         Mood and Affect: Mood normal.       /64   Pulse 98   Resp 14   Wt 263 lb 3.2 oz (119.4 kg)   SpO2 99%   Breastfeeding No   BMI 45.18 kg/m²     Assessment:       Diagnosis Orders   1. Gallstones         :      Return if symptoms worsen or fail to improve. 1. Gallstones  -Patient was encouraged to keep her appointment with Dr. Peyton Lira despite her pregnancy. I discussed that she should talk to him regarding her options regarding possible surgery. She is aware to avoid greasy, fatty, fried foods. She is not currently having any pain at this time. She understands to follow-up as needed at this time. -reviewed Ed record and US report      Patient given educational materials - seepatient instructions. Discussed use, benefit, and side effects of prescribed medications. All patient questions answered. Pt voiced understanding. Reviewed health maintenance. Instructed to continue current medications, diet and exercise. Patient agreedwith treatment plan. Follow up as directed.       Electronically signed by JP Salcido CNP on 8/16/2021at 4:39 PM

## 2021-08-23 ENCOUNTER — OFFICE VISIT (OUTPATIENT)
Dept: SURGERY | Age: 26
End: 2021-08-23
Payer: COMMERCIAL

## 2021-08-23 VITALS
BODY MASS INDEX: 44.9 KG/M2 | HEIGHT: 64 IN | HEART RATE: 86 BPM | WEIGHT: 263 LBS | OXYGEN SATURATION: 99 % | RESPIRATION RATE: 12 BRPM

## 2021-08-23 DIAGNOSIS — K80.20 SYMPTOMATIC CHOLELITHIASIS: Primary | ICD-10-CM

## 2021-08-23 PROCEDURE — 99203 OFFICE O/P NEW LOW 30 MIN: CPT | Performed by: SURGERY

## 2021-08-23 NOTE — PROGRESS NOTES
[facility] General Surgery   History & Physical  Gordy Ortega DO  Pt Name: Beth Villanueva  MRN: [id number]  YOB: 1995  Date of evaluation: 2021  Primary Care Physician: JP Erwin CNP    Chief Complaint: Symptomatic cholelithiasis      SUBJECTIVE:    History of Present Illness: This is a 27 y.o.  female who presents for evaluation for the above, patient reports that she has had for acute episodes of right upper quadrant pain with no specific food triggers in the past several weeks, no prior abdominal surgeries, these acute episodes tend to resolve several hours after onset. Gallbladder ultrasound was obtained that shows gallbladder sludge and stones without evidence of cholecystitis. Patient is 6 weeks gravid    Chart review performed to add information to the HPI: Yes    Past Medical History   has a past medical history of Pulmonary valve stenosis. Past Surgical History   has a past surgical history that includes Cardiac valuve replacement; Cardiac catheterization; Vulva surgery; IR NONTUNNELED VASCULAR CATHETER > 5 YEARS (2020); and  section (N/A, 2020). Family History  family history includes Other in her mother. Social History  Tobacco use:  reports that she has never smoked. She has never used smokeless tobacco.  Alcohol use:  reports previous alcohol use. Drug use:  reports no history of drug use. Medications  Current Medications:   Current Outpatient Medications   Medication Sig Dispense Refill    vitamin D3 (CHOLECALCIFEROL) 25 MCG (1000 UT) TABS tablet Take 1 tablet by mouth daily 30 tablet 5    ibuprofen (ADVIL;MOTRIN) 600 MG tablet Take 1 tablet by mouth every 6 hours as needed for Pain 30 tablet 0    Misc. Devices (BREAST PUMP) MISC Double electric breast pump 1 each 0    Prenatal Vit-Fe Fumarate-FA (PRENATAL PO) Take by mouth       No current facility-administered medications for this visit.      Home Medications:   Prior to Admission medications    Medication Sig Start Date End Date Taking? Authorizing Provider   vitamin D3 (CHOLECALCIFEROL) 25 MCG (1000 UT) TABS tablet Take 1 tablet by mouth daily 4/26/21  Yes JP Jon CNP   ibuprofen (ADVIL;MOTRIN) 600 MG tablet Take 1 tablet by mouth every 6 hours as needed for Pain 12/28/20  Yes Hafsa Rose, DO   Misc. Devices (BREAST PUMP) MISC Double electric breast pump 9/29/20  Yes Arvella JP Topete CNP   Prenatal Vit-Fe Fumarate-FA (PRENATAL PO) Take by mouth   Yes Historical Provider, MD       Allergies  Patient has no known allergies. Review of Systems:  General: Denies any fever, chills. Eyes: Denies any changes in vision, diplopia or eye pain  Ears, Nose, Mouth: Denies changes in hearing/tinnitus or drainage from ears, no rhinorrhea or bloody nose, no difficulty chewing  Throat: no difficulty swallowing, no throat pain  Respiratory: Denies any shortness of breath or cough. Cardiac: Denies any chest pain, palpitations, claudication or edema. Gastrointestinal: RUQ pain    Genitourinary: Denies any frequency, urgency, hesitancy or incontinence. Musculoskeletal: Denies worsening muscle weakness or recent trauma  Skin: Denies rashes or lesions  Psychiatric: Denies any recent changes in mood or affect  Hematologic: Denies bruising or bleeding easily. PHYSICAL EXAMINATION  Vitals:   Vitals:    08/23/21 1414   Pulse: 86   Resp: 12   SpO2: 99%       General Appearance:  awake, alert, no acute distress, well developed, well nourished   Skin:  Skin color, texture, turgor normal. No rashes or lesions. Head/face:  NCAT, face symmetrical  Eyes:  PERRL, no evidence of conjunctivitis or ptosis bilaterally  Ears:  External ears and canals grossly normal, no evidence of otorrhea. Nose/Sinuses:  Nares normal. Septum midline. Mucosa normal. No external drainage noted. Mouth/Neck:  Mucosa moist.  No external oral lesions. Trachea midline. No visible masses. Lungs:  Normal chest expansion, unlabored breathing without accessory muscle use. No audible rales, rhonchi, or wheezing. Cardiovascular: S1S2. No evidence of JVD. No evidence of pulsatile masses in abdomen  Abdomen:  Soft, mild discomfort to deep palpation of the right upper quadrant  Musculoskeletal: No evidence of bony/muscular deformities, trauma, atrophy of either left/right upper/lower extremity. No evidence of digital clubbing or cyanosis. Neurologic:  CN 2-12 grossly intact without obvious deficits. Grossly normal sensation in all extremities. Psychiatric: appropriate judgement and insight, appropriate recall of recent and remote memory, no evidence of depression/anxiety/agitation      RADIOLOGY:  The following images and reports were personally reviewed with the following significant findings pertinent to the Chief Complaint and/or HPI:    US GALLBLADDER RUQ    Result Date: 8/13/2021  EXAMINATION: RIGHT UPPER QUADRANT ULTRASOUND 8/13/2021 8:27 am COMPARISON: None. HISTORY: ORDERING SYSTEM PROVIDED HISTORY: RUQ pain TECHNOLOGIST PROVIDED HISTORY: This procedure can be scheduled via [employer]. Access your [employer] account by visiting [employer]. pt seen in [facility] for ruq pain. concern for gallstones. Reason for Exam: RUQ with nausea and vomiting for 3 months Acuity: Acute Type of Exam: Initial Additional signs and symptoms: None Relevant Medical/Surgical History: Patient is 5 weeks pregnant FINDINGS: LIVER:  The liver demonstrates normal echogenicity without evidence of intrahepatic biliary ductal dilatation. The portal vein is patent with antegrade flow. BILIARY SYSTEM:  Cholelithiasis and sludge. No gallbladder wall thickening or pericholecystic fluid. Negative Wan sign. Common bile duct is within normal limits measuring 4 mm. RIGHT KIDNEY: The right kidney is grossly unremarkable without evidence of hydronephrosis. PANCREAS:  Visualized portions of the pancreas are unremarkable.  OTHER: No evidence of right upper quadrant ascites. Cholelithiasis without evidence for acute cholecystitis or biliary dilatation. DIAGNOSES:   Diagnosis Orders   1. Symptomatic cholelithiasis         PLAN:  · Continue no fat diet  · We discussed the risks and benefits of cholecystectomy, the risk of miscarriage is much higher in the first trimester, if cholecystectomy becomes necessary due to persistent discomfort or pain then delaying until second trimester lowers the risks significantly. Patient has yet to return to her obstetrician (Dr. Pernell Gibbs) for prenatal care for this current pregnancy, patient does have established care with Dr. Narinder Barakat for previous high risk pregnancy, I advised the patient to notify both her obstetrician and [facility] specialist regarding her symptomatic cholelithiasis. · We did discuss signs symptoms of acute cholecystitis at which point patient should proceed directly to [facility] for care at which point we would evaluate her for surgery at that time  · All questions were answered, pt is agreeable to this plan.   ·       Medical Decision Making: low complexity     Electronically signed by Himanshu Quinn DO on 8/23/2021 at 2:48 PM

## 2021-09-01 ENCOUNTER — INITIAL PRENATAL (OUTPATIENT)
Dept: OBGYN CLINIC | Age: 26
End: 2021-09-01

## 2021-09-01 VITALS — WEIGHT: 253 LBS | BODY MASS INDEX: 43.43 KG/M2 | SYSTOLIC BLOOD PRESSURE: 116 MMHG | DIASTOLIC BLOOD PRESSURE: 72 MMHG

## 2021-09-01 DIAGNOSIS — Z3A.01 6 WEEKS GESTATION OF PREGNANCY: ICD-10-CM

## 2021-09-01 DIAGNOSIS — Z34.81 NORMAL PREGNANCY IN MULTIGRAVIDA IN FIRST TRIMESTER: Primary | ICD-10-CM

## 2021-09-01 DIAGNOSIS — O09.891 SHORT INTERVAL BETWEEN PREGNANCIES AFFECTING PREGNANCY IN FIRST TRIMESTER, ANTEPARTUM: ICD-10-CM

## 2021-09-01 DIAGNOSIS — Z98.891 HISTORY OF C-SECTION: ICD-10-CM

## 2021-09-01 DIAGNOSIS — Z87.59 HISTORY OF GESTATIONAL HYPERTENSION: ICD-10-CM

## 2021-09-01 PROBLEM — R89.9 ABNORMAL LABORATORY TEST: Status: RESOLVED | Noted: 2020-12-01 | Resolved: 2021-09-01

## 2021-09-01 PROBLEM — O13.9 GESTATIONAL HYPERTENSION: Status: RESOLVED | Noted: 2020-12-05 | Resolved: 2021-09-01

## 2021-09-01 PROBLEM — O09.93 HIGH-RISK PREGNANCY IN THIRD TRIMESTER: Status: RESOLVED | Noted: 2020-11-29 | Resolved: 2021-09-01

## 2021-09-01 PROBLEM — O43.199 MARGINAL INSERTION OF UMBILICAL CORD AFFECTING MANAGEMENT OF MOTHER: Status: RESOLVED | Noted: 2020-11-29 | Resolved: 2021-09-01

## 2021-09-01 PROCEDURE — 0500F INITIAL PRENATAL CARE VISIT: CPT | Performed by: NURSE PRACTITIONER

## 2021-09-01 RX ORDER — PROMETHAZINE HYDROCHLORIDE 25 MG/1
25 TABLET ORAL 3 TIMES DAILY PRN
Qty: 30 TABLET | Refills: 0 | Status: SHIPPED | OUTPATIENT
Start: 2021-09-01 | End: 2021-10-05

## 2021-09-01 NOTE — PATIENT INSTRUCTIONS
After Hours Number: You can call the office [phone]  or [phone]  Call if you have:  1. Bleeding like a period  2. Cramps or contractions greater than 2 hours  3. If you are leaking fluid  4. If you've a fever greater than 100°  5. If you feel as if baby is not moving  6. If you have continuous vomiting over 3-4 hours        Patient was counseled on weight gain in pregnancy with the following recommendation made based on her BMI:     Singletons:  BMI <18.5: 28-40 lbs weight gain  BMI 18.5-24.9: 25-35 lbs weight gain   BMI 25-29.9: 15-25 lbs weight gain  BMI >/= 30: 11-20 lbs weight gain    Up to 16 weeks around 1 pound a month  16-28 weeks- 2-4 pounds a month  >28 weeks - around 1 pound a week due to increased growth and blood volume      Twins:  BMI <18.5: no reccomendations  BMI 18.5-24.9: 37-45 lbs weight gain  BMI 25-29.9: 31-50 lbs weight gain  BMI >/= 30: 25-42 lbs weight gain    During Pregnancy: Exercises  Your Care Instructions  Here are some examples of exercises to do during your pregnancy. Start each exercise slowly. Ease off the exercise if you start to have pain. Your doctor or physical therapist will tell you when you can start these exercises and which ones will work best for you. How to do the exercises  Neck rotation    1. Sit in a firm chair, or stand up straight. 2. Keeping your chin level, turn your head to the right, and hold for 15 to 30 seconds. 3. Turn your head to the left and hold for 15 to 30 seconds. 4. Repeat 2 to 4 times to each side. Forward neck flexion    1. Sit in a firm chair, or stand up straight. 2. Bend your head forward. 3. Hold for 15 to 30 seconds. 4. Repeat 2 to 4 times. Back press    1. Place your feet 10 to 12 inches from the wall. 2. Rest your back flat against the wall and slide down the wall until your knees are slightly bent. 3. Press your lower back against the wall by pulling in your stomach muscles.   4. Hold for 6 seconds, and then relax your stomach muscles and slide back up the wall. 5. Repeat 8 to 12 times. Full body twist    1. Sit with your legs crossed. 2. Reach your left hand toward your right foot, and place your right hand at your side for support. 3. Slowly twist your torso to your right. 4. Switch your hands and twist to your left. 5. Repeat 2 to 4 times. Pelvic rocking    1. Kneeling on hands and knees, place your hands directly under your shoulders and your knees under your hips. 2. Breathe in deeply. Tuck your head downward and round your back up, making a curve with your back in the shape of the letter C. Hold this position for a count of 6.  3. Breathe out slowly and bring your head back up. Relax, keeping your back straight (don't allow it to curve toward the floor). Hold this for a count of 6.  4. Do this exercise 8 times or to your comfort level. Pelvic tilt    1. Lie on your back. 2. Keep your knees relaxed. 3. Tighten your belly and buttocks muscles. 4. At the same time, gently shift your pelvis upward. This should flatten the curve in your back. 5. Hold for 6 seconds and then relax. 6. Gradually increase the number of tilts you do each day, to your comfort level. Backward stretch    1. Kneel on hands and knees with your knees 8 to 10 inches apart, hands directly under your shoulders, and arms and back straight. 2. Keeping your arms straight, slowly lower your buttocks toward your heels and tuck your head toward your knees. Hold for 15 to 30 seconds. 3. Slowly return to the kneeling position. 4. Repeat 2 to 4 times. Forward bend    1. Sit comfortably in a chair, with your arms relaxed. 2. Slowly bend forward, allowing your arms to hang down in front of you. Lean only as far as you can without feeling discomfort or pressure on your belly. 3. Hold for 15 to 30 seconds and then slowly sit up straight. 4. Repeat 2 to 4 times or to your comfort level. Leg lift crawl    1.  Kneeling on hands and knees, place your hands directly under your shoulders and straighten your arms. 2. Tighten your belly muscles by pulling in your belly button toward your spine. Be sure you continue to breathe normally and do not hold your breath. 3. Lift your left knee and bring it toward your elbow. 4. Slowly extend your leg behind you without completely straightening it. Be careful not to let your hip drop down. Avoid arching your back. 5. Hold your leg behind you for about 6 seconds. 6. Return to your starting position. 7. Do the same exercise with your other leg. 8. Repeat 8 to 12 times for each leg. Tailor sitting    1. Sit on the floor. 2. Bring your feet close to your body while crossing your ankles. 3. Hold this position for as long as you are comfortable. Tailor stretching    1. Sit on the floor with your back straight, legs about 12 inches apart, and feet relaxed outward. 2. Stretch your hands forward toward your left foot, then sit up. 3. Stretch your hands straight forward, then sit up. 4. Stretch your hands forward toward your right foot, then sit up. 5. Hold each stretch for 15 to 30 seconds. 6. Repeat 2 to 4 times. Follow-up care is a key part of your treatment and safety. Be sure to make and go to all appointments, and call your doctor if you are having problems. It's also a good idea to know your test results and keep a list of the medicines you take. Where can you learn more? Go to https://Club EmprendeestrellitaAlimera Sciences.[employer]. org and sign in to your centrose account. Enter S715 in the [facility] box to learn more about \"During Pregnancy: Exercises. \"     If you do not have an account, please click on the \"Sign Up Now\" link. Current as of: September 5, 2018  Content Version: 12.0  © 5618-8255 Healthwise, Incorporated. Care instructions adapted under license by [facility] ([facility]).  If you have questions about a medical condition or this instruction, always ask your healthcare professional. [place], [facility] disclaims any warranty or liability for your use of this information. Nutrition During Pregnancy: Care Instructions  Your Care Instructions    Healthy eating when you are pregnant is important for you and your baby. It can help you feel well and have a successful pregnancy and delivery. During pregnancy your nutrition needs increase. Even if you have excellent eating habits, your doctor may recommend a multivitamin to make sure you get enough iron and folic acid. Many pregnant women wonder how much weight they should gain. In general, women who were at a healthy weight before they became pregnant should gain between 25 and 35 pounds. Women who were overweight before pregnancy are usually advised to gain 15 to 25 pounds. Women who were underweight before pregnancy are usually advised to gain 28 to 40 pounds. Your doctor will work with you to set a weight goal that is right for you. Gaining a healthy amount of weight helps you have a healthy baby. Follow-up care is a key part of your treatment and safety. Be sure to make and go to all appointments, and call your doctor if you are having problems. It's also a good idea to know your test results and keep a list of the medicines you take. How can you care for yourself at home? · Eat plenty of fruits and vegetables. Include a variety of orange, yellow, and leafy dark-green vegetables every day. · Choose whole-grain bread, cereal, and pasta. Good choices include whole wheat bread, whole wheat pasta, brown rice, and oatmeal.  · Get 4 or more servings of milk and milk products each day. Good choices include nonfat or low-fat milk, yogurt, and cheese. If you cannot eat milk products, you can get calcium from calcium-fortified products such as orange juice, soy milk, and tofu. Other non-milk sources of calcium include leafy green vegetables, such as broccoli, kale, mustard greens, turnip greens, bok reddy, and brussels sprouts.   · If you eat meat, pick lower-fat types. Good choices include lean cuts of meat and chicken or turkey without the skin. · Do not eat shark, swordfish, nile mackerel, or tilefish. They have high levels of mercury, which is dangerous to your baby. You can eat up to 12 ounces a week of fish or shellfish that have low mercury levels. Good choices include shrimp, wild salmon, pollock, and catfish. Do not eat more than 6 ounces of tuna each week. · Heat lunch meats (such as turkey, ham, or bologna) to 165°F before you eat them. This reduces your risk of getting sick from a kind of bacteria that can be found in lunch meats. · Do not eat unpasteurized soft cheeses, such as brie, feta, fresh mozzarella, and blue cheese. They have a bacteria that could harm your baby. · Limit caffeine. If you drink coffee or tea, have no more than 1 cup a day. Caffeine is also found in alfred. · Do not drink any alcohol. No amount of alcohol has been found to be safe during pregnancy. · Do not diet or try to lose weight. For example, do not follow a low-carbohydrate diet. If you are overweight at the start of your pregnancy, your doctor will work with you to manage your weight gain. · Tell your doctor about all vitamins and supplements you take. When should you call for help? Watch closely for changes in your health, and be sure to contact your doctor if you have any problems. Where can you learn more? Go to https://Hybrid Electric Vehicle Technologiesmicaela.healthStimatix GI. org and sign in to your Sonopia account. Enter Y785 in the [facility] box to learn more about \"Nutrition During Pregnancy: Care Instructions. \"     If you do not have an account, please click on the \"Sign Up Now\" link. Current as of: September 5, 2018  Content Version: 12.0  © 2820-9806 Healthwise, Incorporated. Care instructions adapted under license by [facility] ([facility]).  If you have questions about a medical condition or this instruction, always ask your healthcare professional. [employer] disclaims any warranty or liability for your use of this information. Managing Morning Sickness: Care Instructions  Your Care Instructions    For many women, the toughest part of early pregnancy is morning sickness. Morning sickness can range from mild nausea to severe nausea with bouts of vomiting. Symptoms may be worse in the morning, although they can strike at any time of the day or night. If you have nausea, vomiting, or both, look for safe measures that can bring you relief. You can take simple steps at home to manage morning sickness. These steps include changing what and when you eat and avoiding certain foods and smells. Some women find that acupuncture and acupressure wristbands also help. Follow-up care is a key part of your treatment and safety. Be sure to make and go to all appointments, and call your doctor if you are having problems. It's also a good idea to know your test results and keep a list of the medicines you take. How can you care for yourself at home? · Keep food in your stomach, but not too much at once. Your nausea may be worse if your stomach is empty. Eat five or six small meals a day instead of three large meals. · For morning nausea, eat a small snack, such as a couple of crackers or dry biscuits, before rising. Allow a few minutes for your stomach to settle before you get out of bed slowly. · Drink plenty of fluids, enough so that your urine is light yellow or clear like water. If you have kidney, heart, or liver disease and have to limit fluids, talk with your doctor before you increase the amount of fluids you drink. Some women find that peppermint tea helps with nausea. · Eat more protein, such as chicken, fish, lean meat, beans, nuts, and seeds. · Eat carbohydrate foods, such as potatoes, whole-grain cereals, rice, and pasta. · Avoid smells and foods that make you feel nauseated.  Spicy or high-fat foods, citrus juice, milk, coffee, and tea with caffeine often make nausea worse. · Do not drink alcohol. · Do not smoke. Try not to be around others who smoke. If you need help quitting, talk to your doctor about stop-smoking programs and medicines. These can increase your chances of quitting for good. · If you are taking iron supplements, ask your doctor if they are necessary. Iron can make nausea worse. · Get lots of rest. Stress and fatigue can make your morning sickness worse. · Ask your doctor about taking prescription medicine, or over-the-counter products such as vitamin B6, doxylamine, or odalys, to relieve your symptoms. Your doctor can tell you the doses that are safe for you. · Take your prenatal vitamins at night on a full stomach. When should you call for help? Call 911 anytime you think you may need emergency care. For example, call if:    · You passed out (lost consciousness). Call your doctor now or seek immediate medical care if:    · You are sick to your stomach or cannot drink fluids. · You have symptoms of dehydration, such as:  ? Dry eyes and a dry mouth. ? Passing only a little urine. ? Feeling thirstier than usual.     · You are not able to keep down your medicine. · You have pain in your belly or pelvis. Watch closely for changes in your health, and be sure to contact your doctor if:    · You do not get better as expected. Where can you learn more? Go to https://Tuenti Technologiespetierneyeb.Jing-Jin Electric Technologies. org and sign in to your American Efficient account. Enter P123 in the [facility] box to learn more about \"Managing Morning Sickness: Care Instructions. \"     If you do not have an account, please click on the \"Sign Up Now\" link. Current as of: September 5, 2018  Content Version: 12.0  © 9462-3728 Healthwise, Incorporated. Care instructions adapted under license by [facility] ([facility]).  If you have questions about a medical condition or this instruction, always ask your healthcare professional. [address] any warranty or liability for your use of this information. Breastfeeding: Care Instructions  Overview      Breastfeeding has many benefits. It may lower your baby's chances of getting an infection. It also may make it less likely that your baby will have problems such as diabetes and obesity later in life. Breastfeeding also helps you bond with your baby. In the first days after birth, your breasts make a thick, yellow liquid called colostrum. This liquid gives your baby nutrients and antibodies against infection. It is all that babies need in the first days after birth. Your breasts will fill with milk a few days after the birth. Breastfeeding is a skill that gets better with practice. Be patient with yourself and your baby. If you have trouble, you can get help and keep breastfeeding. Follow-up care is a key part of your treatment and safety. Be sure to make and go to all appointments, and call your doctor if you are having problems. It's also a good idea to know your test results and keep a list of the medicines you take. How can you care for yourself at home? · Breastfeed your baby whenever he or she is hungry. In the first 2 weeks, your baby will feed about every 1 to 3 hours. That often works out to about 8 to 12 times in a 24-hour period. This will help you keep up your supply of milk. Signs that your baby is hungry include:  ? Sucking on his or her hands. ? [place] his or her lips. ? Turning his or her head toward your breast.  · Put a bed pillow or a nursing pillow on your lap to support your arms and your baby. · Hold your baby in a comfortable position. ? You can hold your baby in several ways. One of the most common positions is the cradle hold. One arm supports your baby, with his or her head in the bend of your elbow. Your open hand supports your baby's bottom or back. Your baby's belly lies against yours. ? If you had your baby by , or , try the football hold.  This position keeps your baby off your belly. Tuck your baby under your arm, with his or her body along the side you will be feeding on. Support your baby's upper body with your arm. With that hand you can control your baby's head to bring his or her mouth to your breast.  ? Try different positions with each feeding. If you are having problems, ask for help from your doctor or a lactation consultant. · To get your baby to latch on:  ? Support and narrow your breast with one hand using a \"U hold,\" with your thumb on the outer side of your breast and your fingers on the inner side. You can also use a \"C hold,\" with all your fingers below the nipple and your thumb above it. Try the different holds to get the deepest latch for whichever breastfeeding position you use. Your other arm is behind your baby's back, with your hand supporting the base of the baby's head. Position your fingers and thumb to point toward your baby's ears. ? You can touch your baby's lower lip with your nipple to get your baby to open his or her mouth. Wait until your baby opens up really wide, like a big yawn. Then be sure to bring the baby quickly to your breastnot your breast to the baby. As you bring your baby toward your breast, use your other hand to support the breast and guide it into his or her mouth. ? Both the nipple and a large portion of the darker area around the nipple (areola) should be in the baby's mouth. The baby's lips should be flared outward, not folded in (inverted). ? Listen for a regular sucking and swallowing pattern while the baby is feeding. If you cannot see or hear a swallowing pattern, watch the baby's ears, which will wiggle slightly when the baby swallows. If the baby's nose appears to be blocked by your breast, bring your baby's body closer to you. This will help tilt the baby's head back slightly, so just the edge of one nostril is clear for breathing. ?  When your baby is latched, you can usually remove your hand from supporting your breast and bring it under your baby to cradle him or her. Now just relax and breastfeed your baby. · You will know that your baby is feeding well when:  ? His or her mouth covers a lot of the areola, and the lips are flared out.  ? His or her chin and nose rest against your breast.  ? Sucking is deep and rhythmic, with short pauses. ? You are able to see and hear your baby swallowing. ? You do not feel pain in your nipple. · Offer both breasts to your baby at each feeding. Each time you breastfeed, switch which breast you start with. · Anytime you need to remove your baby from the breast, put one finger in the corner of his or her mouth. Push your finger between your baby's gums to gently break the seal. If you do not break the tight seal before you remove your baby, your nipples can become sore, cracked, or bruised. · After feeding your baby, gently pat his or her back to let out any swallowed air. After your baby burps, offer the breast again, or offer the other breast. Sometimes a baby will want to keep feeding after being burped. When should you call for help? Call your doctor now or seek immediate medical care if:    · You have symptoms of a breast infection, such as:  ? Increased pain, swelling, redness, or warmth around a breast.  ? Red streaks extending from the breast.  ? Pus draining from a breast.  ? A fever. · Your baby has no wet diapers for 6 hours. Watch closely for changes in your health, and be sure to contact your doctor if:    · Your baby has trouble latching on to your breast.     · You continue to have pain or discomfort when breastfeeding. · You have other questions or concerns. Where can you learn more? Go to https://American ApparelronTransmex Systems International.2NDNATURE. org and sign in to your Talkspace account. Enter P492 in the [employer] box to learn more about \"Breastfeeding: Care Instructions. \"     If you do not have an account, please click on the \"Sign Up Now\" link. Current as of: September 5, 2018  Content Version: 12.0  © 7060-8591 [employer]ceem Communications. Care instructions adapted under license by [facility] ([facility]). If you have questions about a medical condition or this instruction, always ask your healthcare professional. [address] any warranty or liability for your use of this information. Learning About Birth Defects Testing  What is birth defects testing? Birth defects testing is done during pregnancy to look for possible problems with the baby (fetus). A birth defect may have only a minor impact on a child's life. Or it can have a major effect on quality or length of life. You and your doctor can choose from many tests. You may have no tests, one test, or many tests. Talking with a genetic counselor may help you make decisions about testing. The counselor is trained to help you understand these tests. He or she can also help you find resources for support. What are the types of tests? You may have a screening test or a diagnostic test. Or you may have both types of tests. Screening tests show the chance that a baby has a certain birth defect, such as Down syndrome, spina bifida, or trisomy 25. Diagnostic tests show if a baby has a certain birth defect. · Screening tests and when they are done:  ? Blood tests at 10 to 13 weeks (first trimester)  ? Cell free fetal DNA test at 10 weeks or later (first trimester)  ? Nuchal translucency test at 11 to 14 weeks (first trimester)  ? Triple or quad screening at 15 to 20 weeks (second trimester)  ? Ultrasound at 18 to 20 weeks (second trimester)  · Diagnostic tests and when they are done:  ? Chorionic villus sampling (CVS) at 10 to 13 weeks (first trimester)  ? Amniocentesis at 13 to 20 weeks (second trimester)  In some cases, the doctors look at the combined screenings that you've had over a period of time. This is called an integrated screening.   What are the screening tests?  · Blood tests are done to look at different substances in your blood. These tests include cell free fetal DNA and triple or quadruple (quad) blood tests. Both of these tests can help find genetic problems. · Nuchal translucency test uses ultrasound to measure the thickness of the area at the back of the baby's neck. An increase in thickness can be an early sign of certain birth defects. Ultrasound is a tool that uses sound waves to make pictures of your baby and placenta inside the uterus. · Ultrasound lets your doctor see an image of your baby. It can help your doctor look for problems of the heart, spine, belly, or other areas. What are the diagnostic tests? Chorionic villus sampling (CVS) looks at cells from the placenta. To do the test, your doctor may put a thin tube through your vagina and cervix to take out a small piece of the placenta. Or the doctor may take out the piece through a needle in your belly. This test can diagnose many genetic diseases. But it can't find problems with the spinal cord. Amniocentesis looks at the amniotic fluid that surrounds your baby. Your doctor will put a needle through your belly into your uterus and take out a very small amount of fluid to test.  What are the risks of these tests? There is a small risk of a miscarriage after a CVS or amniocentesis. Your doctor or genetic counselor can help you understand this risk. These tests are generally very safe. Where can you learn more? Go to https://XencorronRev.Ticket Cake. org and sign in to your Quintura account. Enter G030 in the [facility] box to learn more about \"Learning About Birth Defects Testing. \"     If you do not have an account, please click on the \"Sign Up Now\" link. Current as of: September 5, 2018  Content Version: 12.0  © 9011-2979 Healthwise, [employer]. Care instructions adapted under license by [address]  If you have questions about a medical condition or this instruction, always ask your healthcare professional. [place] [address] any warranty or liability for your use of this information.

## 2021-09-01 NOTE — PROGRESS NOTES
OB History    Para Term  AB Living   2 1   1   1   SAB TAB Ectopic Molar Multiple Live Births           0 1      # Outcome Date GA Lbr Agustín/2nd Weight Sex Delivery Anes PTL Lv   2 Current            1  20 35w1d  4 lb 5.5 oz (1.97 kg) F CS-LTranv Spinal Y HERMELINDA      Philip Castleman Minor is being seen today for her new obstetrical visit. The pregnancy is  a planned pregnancy. She is  accepting at this time. Her last period was Patient's last menstrual period was 2021. .  This was a sure and definite menses. She was not on OCP at conception.    Gestation 6w6d  Estimated Date of Delivery: 22   Last PAP 1.5 years- negative, hx abnormal: denies    Hx of c/s okay with repeat    Plans to deliver at: [facility]    SO/Partner:  Dhruv Galaviz:   Involved:  yes  Patient Ethnicity:    FOB Ethnicity:         Occupation: Working from home [employer]  As      Pets:  dog    Teratogen exposure since LMP: (OTC/prescription/ETOH/drugs):  denies    History of prior GBS-infected child:  no  Recent travel outside of country (partner also):  no    Known COVID/Zika exposure (partner also): no  Any history of genetic or chromosomal aberations in herself the father to be or their respective families: she has personal hx of pulmonary stenosis, FOB sisters daughter has recently been diagnosed with genetic mitochondrial disorder  Any histories of spina bifida or abdominal wall defects; omphalocele's or gastroschisis no  Hx Hypothyroidism: denies  Hx preeclampsia or HTN:  denies  Hx PPD: denies  Hx Diabetes: denies  Hx GDM: denies  First degree relative with diabetes: denies       No Known Allergies  Current Outpatient Medications   Medication Sig Dispense Refill    promethazine (PHENERGAN) 25 MG tablet Take 1 tablet by mouth 3 times daily as needed for Nausea 30 tablet 0    vitamin D3 (CHOLECALCIFEROL) 25 MCG (1000 UT) TABS tablet Take 1 tablet by mouth daily 30 tablet 5    ibuprofen (ADVIL;MOTRIN) 600 MG tablet Take 1 tablet by mouth every 6 hours as needed for Pain 30 tablet 0    Misc. Devices (BREAST PUMP) MISC Double electric breast pump 1 each 0    Prenatal Vit-Fe Fumarate-FA (PRENATAL PO) Take by mouth       No current facility-administered medications for this visit. Past Medical History:   Diagnosis Date    Pulmonary valve stenosis     age 1      Past Surgical History:   Procedure Laterality Date    CARDIAC CATHETERIZATION      x3 different     CARDIAC VALVE SURGERY      age 1- Balloon valvuoplasty      SECTION N/A 2020     SECTION performed by Leana Torres DO at [address] IR NONTUNNELED VASCULAR CATHETER  2020    IR NONTUNNELED VASCULAR CATHETER 2020 [facility]    VULVA SURGERY      x2          Swelling: no  Bleeding: no  Discharge: no   Dysuria: no  Nausea: yes -  encouraged small frequent meals, and vitamin B6 and unisom if needed. Office protocol reviewed, After hours number provided. Diet and exercise reveiwed  Warning signs reviewed  Testing reviewed     Q&A  Discussed in detail referral/orders for  for 1st trimester screen vs NIPSinfo provided for screening  Discussed with patient that if they proceed with the NIPs testing then they will be opting out of 1st trimester screening which can provide information in regards to placental health, congenital heart defects, metabolic abnormalities, and anatomic abnormalities. Patient is aware and has decided to: declined 1st trimester, considering NIPS  Discussed dates and orders for Anatomy USd and fetal echo if indicated. Breastfeeding education. She verbally consented to HIV testing and drug screening. She was counseled on Toxoplasmosis/Listeriosis (cats/raw meat). Prenatal Vitamins recommended. Prenatal labs and dating us ordered.    Will need fetal echo   Phenergan for nausea also has been dx gallstones- discussed low fat diet       RV prn/ 4 weeks     Of the 30 minute duration appointment visit, [facility] spent at least 50% of the face-to-face time in counseling, explanation of diagnosis, planning of further management, and answering all questions.

## 2021-09-03 ENCOUNTER — HOSPITAL ENCOUNTER (OUTPATIENT)
Age: 26
Setting detail: SPECIMEN
Discharge: HOME | End: 2021-09-03
Payer: COMMERCIAL

## 2021-09-03 DIAGNOSIS — Z87.59 HISTORY OF GESTATIONAL HYPERTENSION: ICD-10-CM

## 2021-09-03 DIAGNOSIS — Z34.81 NORMAL PREGNANCY IN MULTIGRAVIDA IN FIRST TRIMESTER: ICD-10-CM

## 2021-09-03 LAB
-: NORMAL
ABO/RH: NORMAL
ABSOLUTE EOS #: 0.03 K/UL (ref 0–0.44)
ABSOLUTE IMMATURE GRANULOCYTE: <0.03 K/UL (ref 0–0.3)
ABSOLUTE LYMPH #: 2.19 K/UL (ref 1.1–3.7)
ABSOLUTE MONO #: 0.44 K/UL (ref 0.1–1.2)
ALBUMIN SERPL-MCNC: 4.1 G/DL (ref 3.5–5.2)
ALBUMIN/GLOBULIN RATIO: 1.5 (ref 1–2.5)
ALP BLD-CCNC: 80 U/L (ref 35–104)
ALT SERPL-CCNC: 36 U/L (ref 5–33)
AMORPHOUS: NORMAL
AMPHETAMINE SCREEN URINE: NEGATIVE
ANION GAP SERPL CALCULATED.3IONS-SCNC: 16 MMOL/L (ref 9–17)
ANTIBODY SCREEN: NEGATIVE
AST SERPL-CCNC: 20 U/L
BACTERIA: NORMAL
BARBITURATE SCREEN URINE: NEGATIVE
BASOPHILS # BLD: 0 % (ref 0–2)
BASOPHILS ABSOLUTE: <0.03 K/UL (ref 0–0.2)
BENZODIAZEPINE SCREEN, URINE: NEGATIVE
BILIRUB SERPL-MCNC: 0.8 MG/DL (ref 0.3–1.2)
BILIRUBIN URINE: NEGATIVE
BUN BLDV-MCNC: 10 MG/DL (ref 6–20)
BUN/CREAT BLD: ABNORMAL (ref 9–20)
BUPRENORPHINE URINE: NORMAL
CALCIUM SERPL-MCNC: 9.3 MG/DL (ref 8.6–10.4)
CANNABINOID SCREEN URINE: NEGATIVE
CASTS UA: NORMAL /LPF (ref 0–8)
CHLORIDE BLD-SCNC: 103 MMOL/L (ref 98–107)
CO2: 19 MMOL/L (ref 20–31)
COCAINE METABOLITE, URINE: NEGATIVE
COLOR: YELLOW
COMMENT UA: ABNORMAL
CREAT SERPL-MCNC: 0.45 MG/DL (ref 0.5–0.9)
CREATININE URINE: 270.9 MG/DL (ref 28–217)
CRYSTALS, UA: NORMAL /HPF
DIFFERENTIAL TYPE: ABNORMAL
EOSINOPHILS RELATIVE PERCENT: 0 % (ref 1–4)
EPITHELIAL CELLS UA: NORMAL /HPF (ref 0–5)
GFR AFRICAN AMERICAN: >60 ML/MIN
GFR NON-AFRICAN AMERICAN: >60 ML/MIN
GFR SERPL CREATININE-BSD FRML MDRD: ABNORMAL ML/MIN/{1.73_M2}
GFR SERPL CREATININE-BSD FRML MDRD: ABNORMAL ML/MIN/{1.73_M2}
GLUCOSE BLD-MCNC: 88 MG/DL (ref 70–99)
GLUCOSE URINE: NEGATIVE
HCT VFR BLD CALC: 39.8 % (ref 36.3–47.1)
HEMOGLOBIN: 12.6 G/DL (ref 11.9–15.1)
HEPATITIS B SURFACE ANTIGEN: NONREACTIVE
HEPATITIS C ANTIBODY: NONREACTIVE
HIV AG/AB: NONREACTIVE
IMMATURE GRANULOCYTES: 0 %
KETONES, URINE: ABNORMAL
LEUKOCYTE ESTERASE, URINE: NEGATIVE
LYMPHOCYTES # BLD: 31 % (ref 24–43)
MCH RBC QN AUTO: 28.4 PG (ref 25.2–33.5)
MCHC RBC AUTO-ENTMCNC: 31.7 G/DL (ref 28.4–34.8)
MCV RBC AUTO: 89.6 FL (ref 82.6–102.9)
MDMA URINE: NORMAL
METHADONE SCREEN, URINE: NEGATIVE
METHAMPHETAMINE, URINE: NORMAL
MONOCYTES # BLD: 6 % (ref 3–12)
MUCUS: NORMAL
NITRITE, URINE: NEGATIVE
NRBC AUTOMATED: 0 PER 100 WBC
OPIATES, URINE: NEGATIVE
OTHER OBSERVATIONS UA: NORMAL
OXYCODONE SCREEN URINE: NEGATIVE
PDW BLD-RTO: 13.7 % (ref 11.8–14.4)
PH UA: 5.5 (ref 5–8)
PHENCYCLIDINE, URINE: NEGATIVE
PLATELET # BLD: 295 K/UL (ref 138–453)
PLATELET ESTIMATE: ABNORMAL
PMV BLD AUTO: 10.6 FL (ref 8.1–13.5)
POTASSIUM SERPL-SCNC: 3.9 MMOL/L (ref 3.7–5.3)
PROPOXYPHENE, URINE: NORMAL
PROTEIN UA: NEGATIVE
RBC # BLD: 4.44 M/UL (ref 3.95–5.11)
RBC # BLD: ABNORMAL 10*6/UL
RBC UA: NORMAL /HPF (ref 0–4)
RENAL EPITHELIAL, UA: NORMAL /HPF
RUBV IGG SER QL: 77.7 IU/ML
SEG NEUTROPHILS: 63 % (ref 36–65)
SEGMENTED NEUTROPHILS ABSOLUTE COUNT: 4.33 K/UL (ref 1.5–8.1)
SODIUM BLD-SCNC: 138 MMOL/L (ref 135–144)
SPECIFIC GRAVITY UA: 1.03 (ref 1–1.03)
T. PALLIDUM, IGG: NONREACTIVE
TEST INFORMATION: NORMAL
TOTAL PROTEIN, URINE: 14 MG/DL
TOTAL PROTEIN: 6.9 G/DL (ref 6.4–8.3)
TRICHOMONAS: NORMAL
TRICYCLIC ANTIDEPRESSANTS, UR: NORMAL
TURBIDITY: ABNORMAL
URINE HGB: NEGATIVE
URINE TOTAL PROTEIN CREATININE RATIO: 0.05 (ref 0–0.2)
UROBILINOGEN, URINE: NORMAL
WBC # BLD: 7 K/UL (ref 3.5–11.3)
WBC # BLD: ABNORMAL 10*3/UL
WBC UA: NORMAL /HPF (ref 0–5)
YEAST: NORMAL

## 2021-09-04 LAB
CULTURE: NORMAL
Lab: NORMAL
SPECIMEN DESCRIPTION: NORMAL

## 2021-09-06 LAB — VZV IGG SER QL IA: 0.83

## 2021-09-07 ENCOUNTER — TELEPHONE (OUTPATIENT)
Dept: OBGYN CLINIC | Age: 26
End: 2021-09-07

## 2021-09-07 ENCOUNTER — HOSPITAL ENCOUNTER (EMERGENCY)
Age: 26
Discharge: HOME | End: 2021-09-07
Attending: EMERGENCY MEDICINE
Payer: COMMERCIAL

## 2021-09-07 VITALS
OXYGEN SATURATION: 100 % | HEART RATE: 87 BPM | RESPIRATION RATE: 18 BRPM | WEIGHT: 230 LBS | BODY MASS INDEX: 39.27 KG/M2 | DIASTOLIC BLOOD PRESSURE: 79 MMHG | HEIGHT: 64 IN | SYSTOLIC BLOOD PRESSURE: 131 MMHG | TEMPERATURE: 98.2 F

## 2021-09-07 DIAGNOSIS — R11.2 NON-INTRACTABLE VOMITING WITH NAUSEA, UNSPECIFIED VOMITING TYPE: Primary | ICD-10-CM

## 2021-09-07 LAB
ABSOLUTE EOS #: 0.04 K/UL (ref 0–0.44)
ABSOLUTE IMMATURE GRANULOCYTE: 0.03 K/UL (ref 0–0.3)
ABSOLUTE LYMPH #: 3.24 K/UL (ref 1.1–3.7)
ABSOLUTE MONO #: 0.54 K/UL (ref 0.1–1.2)
ALBUMIN SERPL-MCNC: 4.1 G/DL (ref 3.5–5.2)
ALBUMIN/GLOBULIN RATIO: 1.4 (ref 1–2.5)
ALP BLD-CCNC: 78 U/L (ref 35–104)
ALT SERPL-CCNC: 29 U/L (ref 5–33)
ANION GAP SERPL CALCULATED.3IONS-SCNC: 10 MMOL/L (ref 9–17)
AST SERPL-CCNC: 13 U/L
BASOPHILS # BLD: 0 % (ref 0–2)
BASOPHILS ABSOLUTE: <0.03 K/UL (ref 0–0.2)
BILIRUB SERPL-MCNC: 0.74 MG/DL (ref 0.3–1.2)
BUN BLDV-MCNC: 10 MG/DL (ref 6–20)
BUN/CREAT BLD: ABNORMAL (ref 9–20)
C. TRACHOMATIS DNA ,URINE: NEGATIVE
CALCIUM SERPL-MCNC: 9.1 MG/DL (ref 8.6–10.4)
CHLORIDE BLD-SCNC: 101 MMOL/L (ref 98–107)
CO2: 21 MMOL/L (ref 20–31)
CREAT SERPL-MCNC: 0.46 MG/DL (ref 0.5–0.9)
DIFFERENTIAL TYPE: ABNORMAL
EOSINOPHILS RELATIVE PERCENT: 0 % (ref 1–4)
GFR AFRICAN AMERICAN: >60 ML/MIN
GFR NON-AFRICAN AMERICAN: >60 ML/MIN
GFR SERPL CREATININE-BSD FRML MDRD: ABNORMAL ML/MIN/{1.73_M2}
GFR SERPL CREATININE-BSD FRML MDRD: ABNORMAL ML/MIN/{1.73_M2}
GLUCOSE BLD-MCNC: 88 MG/DL (ref 70–99)
HCT VFR BLD CALC: 40.9 % (ref 36.3–47.1)
HEMOGLOBIN: 13.2 G/DL (ref 11.9–15.1)
IMMATURE GRANULOCYTES: 0 %
LYMPHOCYTES # BLD: 35 % (ref 24–43)
MCH RBC QN AUTO: 28.3 PG (ref 25.2–33.5)
MCHC RBC AUTO-ENTMCNC: 32.3 G/DL (ref 28.4–34.8)
MCV RBC AUTO: 87.6 FL (ref 82.6–102.9)
MONOCYTES # BLD: 6 % (ref 3–12)
N. GONORRHOEAE DNA, URINE: NEGATIVE
NRBC AUTOMATED: 0 PER 100 WBC
PDW BLD-RTO: 13.6 % (ref 11.8–14.4)
PLATELET # BLD: 301 K/UL (ref 138–453)
PLATELET ESTIMATE: ABNORMAL
PMV BLD AUTO: 10.2 FL (ref 8.1–13.5)
POTASSIUM SERPL-SCNC: 3.7 MMOL/L (ref 3.7–5.3)
RBC # BLD: 4.67 M/UL (ref 3.95–5.11)
RBC # BLD: ABNORMAL 10*6/UL
SEG NEUTROPHILS: 59 % (ref 36–65)
SEGMENTED NEUTROPHILS ABSOLUTE COUNT: 5.45 K/UL (ref 1.5–8.1)
SODIUM BLD-SCNC: 132 MMOL/L (ref 135–144)
SPECIMEN DESCRIPTION: NORMAL
TOTAL PROTEIN: 7.1 G/DL (ref 6.4–8.3)
WBC # BLD: 9.3 K/UL (ref 3.5–11.3)
WBC # BLD: ABNORMAL 10*3/UL

## 2021-09-07 PROCEDURE — 96375 TX/PRO/DX INJ NEW DRUG ADDON: CPT

## 2021-09-07 PROCEDURE — 6360000002 HC RX W HCPCS: Performed by: STUDENT IN AN ORGANIZED HEALTH CARE EDUCATION/TRAINING PROGRAM

## 2021-09-07 PROCEDURE — 80053 COMPREHEN METABOLIC PANEL: CPT

## 2021-09-07 PROCEDURE — 2580000003 HC RX 258: Performed by: STUDENT IN AN ORGANIZED HEALTH CARE EDUCATION/TRAINING PROGRAM

## 2021-09-07 PROCEDURE — 99284 EMERGENCY DEPT VISIT MOD MDM: CPT

## 2021-09-07 PROCEDURE — 85025 COMPLETE CBC W/AUTO DIFF WBC: CPT

## 2021-09-07 PROCEDURE — 96361 HYDRATE IV INFUSION ADD-ON: CPT

## 2021-09-07 PROCEDURE — 96374 THER/PROPH/DIAG INJ IV PUSH: CPT

## 2021-09-07 RX ORDER — ONDANSETRON 4 MG/1
4 TABLET, ORALLY DISINTEGRATING ORAL EVERY 8 HOURS PRN
Qty: 6 TABLET | Refills: 0 | Status: SHIPPED | OUTPATIENT
Start: 2021-09-07 | End: 2021-09-09

## 2021-09-07 RX ORDER — SODIUM CHLORIDE, SODIUM LACTATE, POTASSIUM CHLORIDE, CALCIUM CHLORIDE 600; 310; 30; 20 MG/100ML; MG/100ML; MG/100ML; MG/100ML
1000 INJECTION, SOLUTION INTRAVENOUS ONCE
Status: COMPLETED | OUTPATIENT
Start: 2021-09-07 | End: 2021-09-07

## 2021-09-07 RX ORDER — ONDANSETRON 2 MG/ML
4 INJECTION INTRAMUSCULAR; INTRAVENOUS ONCE
Status: COMPLETED | OUTPATIENT
Start: 2021-09-07 | End: 2021-09-07

## 2021-09-07 RX ORDER — DIPHENHYDRAMINE HYDROCHLORIDE 50 MG/ML
25 INJECTION INTRAMUSCULAR; INTRAVENOUS ONCE
Status: COMPLETED | OUTPATIENT
Start: 2021-09-07 | End: 2021-09-07

## 2021-09-07 RX ORDER — ONDANSETRON 4 MG/1
4 TABLET, ORALLY DISINTEGRATING ORAL EVERY 8 HOURS PRN
Qty: 30 TABLET | Refills: 0 | Status: SHIPPED | OUTPATIENT
Start: 2021-09-07

## 2021-09-07 RX ORDER — METOCLOPRAMIDE HYDROCHLORIDE 5 MG/ML
10 INJECTION INTRAMUSCULAR; INTRAVENOUS ONCE
Status: COMPLETED | OUTPATIENT
Start: 2021-09-07 | End: 2021-09-07

## 2021-09-07 RX ORDER — 0.9 % SODIUM CHLORIDE 0.9 %
1000 INTRAVENOUS SOLUTION INTRAVENOUS ONCE
Status: COMPLETED | OUTPATIENT
Start: 2021-09-07 | End: 2021-09-07

## 2021-09-07 RX ADMIN — ONDANSETRON 4 MG: 2 INJECTION INTRAMUSCULAR; INTRAVENOUS at 19:05

## 2021-09-07 RX ADMIN — SODIUM CHLORIDE 1000 ML: 9 INJECTION, SOLUTION INTRAVENOUS at 19:05

## 2021-09-07 RX ADMIN — SODIUM CHLORIDE, POTASSIUM CHLORIDE, SODIUM LACTATE AND CALCIUM CHLORIDE 1000 ML: 600; 310; 30; 20 INJECTION, SOLUTION INTRAVENOUS at 20:19

## 2021-09-07 RX ADMIN — METOCLOPRAMIDE 10 MG: 5 INJECTION, SOLUTION INTRAMUSCULAR; INTRAVENOUS at 20:30

## 2021-09-07 RX ADMIN — DIPHENHYDRAMINE HYDROCHLORIDE 25 MG: 50 INJECTION, SOLUTION INTRAMUSCULAR; INTRAVENOUS at 20:31

## 2021-09-07 ASSESSMENT — ENCOUNTER SYMPTOMS
ABDOMINAL PAIN: 0
VOMITING: 1
SHORTNESS OF BREATH: 0
NAUSEA: 1
BACK PAIN: 0

## 2021-09-07 NOTE — TELEPHONE ENCOUNTER
Informed, she has been vomiting, it has been about an hour since she has been sick but if it continues she will go in for hydration. She is aware zofran was called in.

## 2021-09-07 NOTE — ED PROVIDER NOTES
[facility]  ED  Emergency Department Encounter  Emergency Medicine Resident     Pt Name: Sue Baker  MRN: [id number]  Ramosgfrosemarie 1995  Date of evaluation: 21  PCP:  JP Curtis CNP    CHIEF COMPLAINT       Chief Complaint   Patient presents with    Emesis     started 3 weeks ago but has been all day; unable to keep food or liquids down    Cholelithiasis    Routine Prenatal Visit       HISTORY OFPRESENT ILLNESS  (Location/Symptom, Timing/Onset, Context/Setting, Quality, Duration, Modifying Factors,Severity.)      Davian Arredondo is a 27 y. o.yo female who presents with n/v. PAST MEDICAL / SURGICAL / SOCIAL / FAMILY HISTORY      has a past medical history of Pulmonary valve stenosis. has a past surgical history that includes Cardiac valuve replacement; Cardiac catheterization; Vulva surgery; IR NONTUNNELED VASCULAR CATHETER > 5 YEARS (2020); and  section (N/A, 2020). Social History     Socioeconomic History    Marital status:      Spouse name: Not on file    Number of children: Not on file    Years of education: Not on file    Highest education level: Not on file   Occupational History    Not on file   Tobacco Use    Smoking status: Never Smoker    Smokeless tobacco: Never Used   Vaping Use    Vaping Use: Never used   Substance and Sexual Activity    Alcohol use: Not Currently    Drug use: Never    Sexual activity: Yes     Partners: Male   Other Topics Concern    Not on file   Social History Narrative    Not on file     Social Determinants of Health     Financial Resource Strain: Low Risk     Difficulty of Paying Living Expenses: Not hard at all   Food Insecurity: No Food Insecurity    Worried About Running Out of Food in the Last Year: Never true    [address] in the Last Year: Never true   Transportation Needs:     Lack of Transportation (Medical):      Lack of Transportation (Non-Medical):    Physical Activity:     Days of Exercise per Week:     Minutes of Exercise per Session:    Stress:     Feeling of Stress :    Social Connections:     Frequency of Communication with Friends and Family:     Frequency of Social Gatherings with Friends and Family:     Attends Denominational Services:     Active Member of Clubs or Organizations:     Attends Club or Organization Meetings:     Marital Status:    Intimate Partner Violence:     Fear of Current or Ex-Partner:     Emotionally Abused:     Physically Abused:     Sexually Abused:        Family History   Problem Relation Age of Onset    Other Mother         Lupus        Allergies:  Patient has no known allergies. Home Medications:  Prior to Admission medications    Medication Sig Start Date End Date Taking? Authorizing Provider   ondansetron (ZOFRAN ODT) 4 MG disintegrating tablet Take 1 tablet by mouth every 8 hours as needed for Nausea 9/7/21 9/9/21 Yes Naty Almeida MD   ondansetron (ZOFRAN ODT) 4 MG disintegrating tablet Place 1 tablet under the tongue every 8 hours as needed for Nausea or Vomiting 9/7/21   Danyelle JP Gaona CNP   promethazine (PHENERGAN) 25 MG tablet Take 1 tablet by mouth 3 times daily as needed for Nausea 9/1/21   Danyelle JP Gaona - CNP   vitamin D3 (CHOLECALCIFEROL) 25 MCG (1000 UT) TABS tablet Take 1 tablet by mouth daily 4/26/21   JP Prakash CNP   ibuprofen (ADVIL;MOTRIN) 600 MG tablet Take 1 tablet by mouth every 6 hours as needed for Pain 12/28/20   Rea Matute, DO   Misc. Devices (BREAST PUMP) MISC Double electric breast pump 9/29/20   Danyelle JP Gaona CNP   Prenatal Vit-Fe Fumarate-FA (PRENATAL PO) Take by mouth    Historical Provider, MD       REVIEW OFSYSTEMS    (2-9 systems for level 4, 10 or more for level 5)      Review of Systems   Constitutional: Negative for diaphoresis and fever. HENT: Negative for congestion. Eyes: Negative for visual disturbance.    Respiratory: Negative for shortness of breath. Cardiovascular: Negative for chest pain. Gastrointestinal: Positive for nausea and vomiting. Negative for abdominal pain. Endocrine: Negative for polyuria. Genitourinary: Negative for dysuria. Musculoskeletal: Negative for back pain. Skin: Negative for wound. Neurological: Negative for headaches. Psychiatric/Behavioral: Negative for confusion. PHYSICAL EXAM   (up to 7 for level 4, 8 or more forlevel 5)      ED TRIAGE VITALS BP: 131/79, Temp: 98.2 °F (36.8 °C), Pulse: 87, Resp: 18, SpO2: 100 %    Vitals:    09/07/21 1614   BP: 131/79   Pulse: 87   Resp: 18   Temp: 98.2 °F (36.8 °C)   TempSrc: Oral   SpO2: 100%   Weight: 230 lb (104.3 kg)   Height: 5' 4\" (1.626 m)       Physical Exam  Constitutional:       General: She is not in acute distress. Appearance: She is well-developed. HENT:      Head: Normocephalic and atraumatic. Nose: Nose normal.   Eyes:      Pupils: Pupils are equal, round, and reactive to light. Cardiovascular:      Rate and Rhythm: Normal rate and regular rhythm. Heart sounds: No murmur heard. Pulmonary:      Effort: Pulmonary effort is normal. No respiratory distress. Breath sounds: No stridor. No wheezing. Abdominal:      General: There is no distension. Palpations: Abdomen is soft. Tenderness: There is abdominal tenderness (MINOR RUQ tenderness). Musculoskeletal:         General: No tenderness. Normal range of motion. Cervical back: Normal range of motion and neck supple. Skin:     General: Skin is warm and dry. Capillary Refill: Capillary refill takes less than 2 seconds. Findings: No erythema or rash. Neurological:      Mental Status: She is alert and oriented to person, place, and time. Sensory: No sensory deficit.       Deep Tendon Reflexes: Reflexes normal.   Psychiatric:         Behavior: Behavior normal.         DIFFERENTIAL  DIAGNOSIS     PLAN (Mone Patel / Maria Isabel Patrick / EKG):  Orders Placed This Encounter   Procedures    CBC Auto Differential    Comprehensive Metabolic Panel w/ Reflex to MG    Saline lock IV    Insert peripheral IV       MEDICATIONS ORDERED:  Orders Placed This Encounter   Medications    0.9 % sodium chloride bolus    ondansetron (ZOFRAN) injection 4 mg    lactated ringers infusion 1,000 mL    metoclopramide (REGLAN) injection 10 mg    diphenhydrAMINE (BENADRYL) injection 25 mg    ondansetron (ZOFRAN ODT) 4 MG disintegrating tablet     Sig: Take 1 tablet by mouth every 8 hours as needed for Nausea     Dispense:  6 tablet     Refill:  0       DDX:     Cholecystitis versus hyperemesis pregnancy    Initial MDM/Plan: 27 y.o. female who presents with n/v. Cholecystitis:  History of gallstones  No abdominal pain  Slight tenderness on palpation  Ultrasound did not show pericolic fluid stones present  Blood work normal  Symptomatic management and control    Hyperemesis of pregnancy:  8 weeks pregnant  No abdominal pain  No vaginal pain or bleeding  Bedside ultrasound shows fetal tone of 160  Symptomatic control of nausea  Tolerating p.o.   Discharge  Follow-up with OB/GYN within a week    DIAGNOSTIC RESULTS / EMERGENCYDEPARTMENT COURSE / MDM     LABS:  Results for orders placed or performed during the hospital encounter of 09/07/21   CBC Auto Differential   Result Value Ref Range    WBC 9.3 3.5 - 11.3 k/uL    RBC 4.67 3.95 - 5.11 m/uL    Hemoglobin 13.2 11.9 - 15.1 g/dL    Hematocrit 40.9 36.3 - 47.1 %    MCV 87.6 82.6 - 102.9 fL    MCH 28.3 25.2 - 33.5 pg    MCHC 32.3 28.4 - 34.8 g/dL    RDW 13.6 11.8 - 14.4 %    Platelets 501 769 - 976 k/uL    MPV 10.2 8.1 - 13.5 fL    NRBC Automated 0.0 0.0 per 100 WBC    Differential Type NOT REPORTED     Seg Neutrophils 59 36 - 65 %    Lymphocytes 35 24 - 43 %    Monocytes 6 3 - 12 %    Eosinophils % 0 (L) 1 - 4 %    Basophils 0 0 - 2 %    Immature Granulocytes 0 0 %    Segs Absolute 5.45 1.50 - 8.10 k/uL    Absolute Lymph # 3.24 1.10 - 3.70 k/uL    Absolute Mono # 0.54 0.10 - 1.20 k/uL    Absolute Eos # 0.04 0.00 - 0.44 k/uL    Basophils Absolute <0.03 0.00 - 0.20 k/uL    Absolute Immature Granulocyte 0.03 0.00 - 0.30 k/uL    WBC Morphology NOT REPORTED     RBC Morphology NOT REPORTED     Platelet Estimate NOT REPORTED    Comprehensive Metabolic Panel w/ Reflex to MG   Result Value Ref Range    Glucose 88 70 - 99 mg/dL    BUN 10 6 - 20 mg/dL    CREATININE 0.46 (L) 0.50 - 0.90 mg/dL    Bun/Cre Ratio NOT REPORTED 9 - 20    Calcium 9.1 8.6 - 10.4 mg/dL    Sodium 132 (L) 135 - 144 mmol/L    Potassium 3.7 3.7 - 5.3 mmol/L    Chloride 101 98 - 107 mmol/L    CO2 21 20 - 31 mmol/L    Anion Gap 10 9 - 17 mmol/L    Alkaline Phosphatase 78 35 - 104 U/L    ALT 29 5 - 33 U/L    AST 13 <32 U/L    Total Bilirubin 0.74 0.3 - 1.2 mg/dL    Total Protein 7.1 6.4 - 8.3 g/dL    Albumin 4.1 3.5 - 5.2 g/dL    Albumin/Globulin Ratio 1.4 1.0 - 2.5    GFR Non-African American >60 >60 mL/min    GFR African American >60 >60 mL/min    GFR Comment          GFR Staging NOT REPORTED        RADIOLOGY:  No orders to display         EMERGENCY DEPARTMENT COURSE:  ED Course as of Sep 07 2040   Tue Sep 07, 2021   1930 Patient seen and assessed in the emergency department no acute respiratory cardiovascular distress. Patient here with nausea and vomiting, is 8 weeks pregnant, G2, P1, history of gallstones but no abdominal pain, no fevers no jaundice. Denies any traumatic injuries to the abdomen, headache or vision changes. States that she has been throwing up today, has had 7 episode of emesis, no history of hyperemesis during her past pregnancy. States she called OB and was told to come to the emergency department. Denies any vaginal complaints.     [PS]   2003 Bedside US fetal tones 160      [PS]   2036 Tolerating PO    [PS]      ED Course User Index  [PS] Pepper Parker MD          PROCEDURES:  None    CONSULTS:  None    CRITICAL CARE:  Please see attending note    FINAL IMPRESSION      1. Non-intractable vomiting with nausea, unspecified vomiting type          DISPOSITION / PLAN     DISPOSITION Decision To Discharge 09/07/2021 08:37:00 PM       PATIENT REFERRED TO:  JP Erwin .  Dr. Riley Gonzalez [address] [facility] [address].  [phone]    In 3 days      [facility] ED  [address] [address]  [phone]    As needed, If symptoms worsen    Phuc Lewis DO  [address]  [phone]      Make an appointment soon as possible      DISCHARGE MEDICATIONS:  New Prescriptions    ONDANSETRON (ZOFRAN ODT) 4 MG DISINTEGRATING TABLET    Take 1 tablet by mouth every 8 hours as needed for Nausea       Clemens Sandhoff, MD  Emergency Medicine Resident    (Please note that portions of this note were completed with a voice recognition program.Efforts were made to edit the dictations but occasionally words are mis-transcribed.)       Clemens Sandhoff, MD  Resident  09/07/21 2040

## 2021-09-07 NOTE — ED TRIAGE NOTES
Pt arrived to the ED with c/o nausea and vomiting for the past couple of days. Pt is currently 8 weeks pregnant and was told by OB to come in a get IV fluids and Zofran. Pt is alert and oriented x4. VSS.

## 2021-09-07 NOTE — TELEPHONE ENCOUNTER
I have called her in zofran to use as needed. If vomiting consistently for more than 3-4 hours and unable to keep any liquids/food then needs to go to ER to be rehydrated.

## 2021-09-07 NOTE — TELEPHONE ENCOUNTER
Left a voicemail and said phenergan is not working anymore. Can we send something else in? She is 7w5d. Stated the nausea is debilitating.

## 2021-09-08 NOTE — ED PROVIDER NOTES
This 41-year-old female presents emergency department with complaints of nausea vomiting and abdominal discomfort in the past several days. Patient is also a  2 para 1 approximately 8 weeks gestation at this point in time. She denies any vaginal bleeding or discharge. No hematemesis. No diarrhea. No melena or hematochezia. No dysuria or hematuria. No fever or chills. No chest pain or difficulty breathing. No cough. No shortness of breath. No change in senses of smell or taste. Patient has known history of cholelithiasis. She reports mild nausea during her first pregnancy without emesis. Patient is awake and alert. She is cooperative and responsive. Speech is fluent and comprehension is normal.  Lungs are clear to auscultation and percussion bilaterally. Cardiac exam reveals an S1-S2, regular rate and rhythm without murmur rub or gallop. Abdomen is soft and nondistended. There is mild right upper quadrant and epigastric tenderness. No guarding or rebound. Negative Wan sign. Bowel sounds are normal.  Point-of-care ultrasound was obtained by the resident who reports cholelithiasis without evidence of pericholecystic fluid or gallbladder wall thickening. He reports ultrasound of the uterus demonstrated a viable fetus. Lab results have been reviewed and appear to be essentially normal.  On reassessment the patient reports her symptoms have improved significantly. She will be discharged with instructions to follow-up with her OB as scheduled. She will receive a prescription for antiemetics and be advised to continue her dietary restrictions as before. She is also asked to return to the emergency department should her symptoms worsen, persist, progress, recur.      Yudy Singletary MD  21

## 2021-09-09 ENCOUNTER — PATIENT MESSAGE (OUTPATIENT)
Dept: OBGYN CLINIC | Age: 26
End: 2021-09-09

## 2021-09-14 ENCOUNTER — TELEPHONE (OUTPATIENT)
Dept: OBGYN CLINIC | Age: 26
End: 2021-09-14

## 2021-09-14 DIAGNOSIS — R11.2 NAUSEA AND VOMITING, INTRACTABILITY OF VOMITING NOT SPECIFIED, UNSPECIFIED VOMITING TYPE: Primary | ICD-10-CM

## 2021-09-14 DIAGNOSIS — K80.20 GALLSTONES: ICD-10-CM

## 2021-09-14 NOTE — TELEPHONE ENCOUNTER
Pt is 8wk 5 days- she is throwing up at least 4 x a day- can not keep water down- throwing up bile at this point- pt was given zofran and it is not helping- stated may need to go back to the ED for IV hydration- stated will ask jane for her recommendations.  She has also tried [facility] as well

## 2021-09-14 NOTE — TELEPHONE ENCOUNTER
She has prescriptions for phenergan and zofran and if symptom sare this severe then she needs to return to ER.   We may need to get her in for appointment and if losing weight gain and ER visits for vomiting we can get referral for zofran pump

## 2021-09-15 ENCOUNTER — HOSPITAL ENCOUNTER (OUTPATIENT)
Age: 26
Setting detail: SPECIMEN
Discharge: HOME | End: 2021-09-15
Payer: COMMERCIAL

## 2021-09-15 DIAGNOSIS — K80.20 GALLSTONES: ICD-10-CM

## 2021-09-15 DIAGNOSIS — R11.2 NAUSEA AND VOMITING, INTRACTABILITY OF VOMITING NOT SPECIFIED, UNSPECIFIED VOMITING TYPE: ICD-10-CM

## 2021-09-15 LAB
ALBUMIN SERPL-MCNC: 4.1 G/DL (ref 3.5–5.2)
ALBUMIN/GLOBULIN RATIO: 1.4 (ref 1–2.5)
ALP BLD-CCNC: 71 U/L (ref 35–104)
ALT SERPL-CCNC: 28 U/L (ref 5–33)
AMYLASE: 39 U/L (ref 28–100)
ANION GAP SERPL CALCULATED.3IONS-SCNC: 16 MMOL/L (ref 9–17)
AST SERPL-CCNC: 27 U/L
BILIRUB SERPL-MCNC: 0.67 MG/DL (ref 0.3–1.2)
BUN BLDV-MCNC: 9 MG/DL (ref 6–20)
BUN/CREAT BLD: ABNORMAL (ref 9–20)
CALCIUM SERPL-MCNC: 8.9 MG/DL (ref 8.6–10.4)
CHLORIDE BLD-SCNC: 102 MMOL/L (ref 98–107)
CO2: 19 MMOL/L (ref 20–31)
CREAT SERPL-MCNC: 0.53 MG/DL (ref 0.5–0.9)
GFR AFRICAN AMERICAN: >60 ML/MIN
GFR NON-AFRICAN AMERICAN: >60 ML/MIN
GFR SERPL CREATININE-BSD FRML MDRD: ABNORMAL ML/MIN/{1.73_M2}
GFR SERPL CREATININE-BSD FRML MDRD: ABNORMAL ML/MIN/{1.73_M2}
GLUCOSE BLD-MCNC: 91 MG/DL (ref 70–99)
LIPASE: 22 U/L (ref 13–60)
POTASSIUM SERPL-SCNC: 3.9 MMOL/L (ref 3.7–5.3)
SODIUM BLD-SCNC: 137 MMOL/L (ref 135–144)
TOTAL PROTEIN: 7 G/DL (ref 6.4–8.3)

## 2021-09-15 NOTE — TELEPHONE ENCOUNTER
We should probably recheck labs and gallbladder US to make sure no obstruction and yes we can start her for the zofran pump.

## 2021-09-15 NOTE — TELEPHONE ENCOUNTER
Called back- in Aug she was there sometime and was 263 lbs, now she is 10 lbs lighter. She said meds are helping but still getting sick quite a bit. She is interested in zofran pump, should we start this?    shes been to the ER about 4 times already    Suggested she go back in- she said it is usually a long wait and wondered if there was another option    She has gallstones, she is wondering if this is making it worse.

## 2021-09-16 ENCOUNTER — HOSPITAL ENCOUNTER (OUTPATIENT)
Dept: ULTRASOUND IMAGING | Facility: CLINIC | Age: 26
Discharge: HOME | End: 2021-09-18
Payer: COMMERCIAL

## 2021-09-16 ENCOUNTER — TELEPHONE (OUTPATIENT)
Dept: OBGYN CLINIC | Age: 26
End: 2021-09-16

## 2021-09-16 DIAGNOSIS — K80.20 GALLSTONES: ICD-10-CM

## 2021-09-16 DIAGNOSIS — R11.2 NAUSEA AND VOMITING, INTRACTABILITY OF VOMITING NOT SPECIFIED, UNSPECIFIED VOMITING TYPE: ICD-10-CM

## 2021-09-16 PROCEDURE — 76705 ECHO EXAM OF ABDOMEN: CPT

## 2021-09-16 NOTE — TELEPHONE ENCOUNTER
No significant change son US of glabladder still gall stones, does not appear to have blockage at this point and her labs are normal.  Let her know we sent referral for zofran pump yesterday an see if she has by chance heard from them yet.

## 2021-09-16 NOTE — TELEPHONE ENCOUNTER
Patient phoned office c/o nausea. 8 w IUP  Had gallbladder US this am. Results in chart. Patient informed provider will need to view results and she will be informed. Also to try sips of water or ginger ale over ice, small amts baked or mashed potato to see if helps with nausea. No vomiting at this time.     Patient will await call back re: US results

## 2021-09-19 ENCOUNTER — HOSPITAL ENCOUNTER (EMERGENCY)
Age: 26
Discharge: HOME | End: 2021-09-19
Attending: EMERGENCY MEDICINE
Payer: COMMERCIAL

## 2021-09-19 VITALS
DIASTOLIC BLOOD PRESSURE: 70 MMHG | OXYGEN SATURATION: 96 % | WEIGHT: 246 LBS | TEMPERATURE: 98.4 F | HEIGHT: 64 IN | BODY MASS INDEX: 42 KG/M2 | SYSTOLIC BLOOD PRESSURE: 147 MMHG | RESPIRATION RATE: 16 BRPM | HEART RATE: 88 BPM

## 2021-09-19 DIAGNOSIS — O21.9 NAUSEA AND VOMITING IN PREGNANCY PRIOR TO 22 WEEKS GESTATION: Primary | ICD-10-CM

## 2021-09-19 LAB
ABSOLUTE EOS #: 0 K/UL (ref 0–0.4)
ABSOLUTE IMMATURE GRANULOCYTE: ABNORMAL K/UL (ref 0–0.3)
ABSOLUTE LYMPH #: 1.8 K/UL (ref 1–4.8)
ABSOLUTE MONO #: 0.4 K/UL (ref 0.1–1.2)
ALBUMIN SERPL-MCNC: 4.2 G/DL (ref 3.5–5.2)
ALBUMIN/GLOBULIN RATIO: 1.4 (ref 1–2.5)
ALP BLD-CCNC: 72 U/L (ref 35–104)
ALT SERPL-CCNC: 26 U/L (ref 5–33)
ANION GAP SERPL CALCULATED.3IONS-SCNC: 12 MMOL/L (ref 9–17)
AST SERPL-CCNC: 14 U/L
BASOPHILS # BLD: 0 % (ref 0–2)
BASOPHILS ABSOLUTE: 0 K/UL (ref 0–0.2)
BILIRUB SERPL-MCNC: 1.13 MG/DL (ref 0.3–1.2)
BUN BLDV-MCNC: 9 MG/DL (ref 6–20)
BUN/CREAT BLD: ABNORMAL (ref 9–20)
CALCIUM SERPL-MCNC: 9.5 MG/DL (ref 8.6–10.4)
CHLORIDE BLD-SCNC: 100 MMOL/L (ref 98–107)
CO2: 21 MMOL/L (ref 20–31)
CREAT SERPL-MCNC: 0.54 MG/DL (ref 0.5–0.9)
DIFFERENTIAL TYPE: ABNORMAL
EOSINOPHILS RELATIVE PERCENT: 0 % (ref 1–4)
GFR AFRICAN AMERICAN: >60 ML/MIN
GFR NON-AFRICAN AMERICAN: >60 ML/MIN
GFR SERPL CREATININE-BSD FRML MDRD: ABNORMAL ML/MIN/{1.73_M2}
GFR SERPL CREATININE-BSD FRML MDRD: ABNORMAL ML/MIN/{1.73_M2}
GLUCOSE BLD-MCNC: 87 MG/DL (ref 70–99)
HCT VFR BLD CALC: 38.4 % (ref 36–46)
HEMOGLOBIN: 12.9 G/DL (ref 12–16)
IMMATURE GRANULOCYTES: ABNORMAL %
LYMPHOCYTES # BLD: 31 % (ref 24–44)
MAGNESIUM: 2 MG/DL (ref 1.6–2.6)
MCH RBC QN AUTO: 28.7 PG (ref 26–34)
MCHC RBC AUTO-ENTMCNC: 33.6 G/DL (ref 31–37)
MCV RBC AUTO: 85.6 FL (ref 80–100)
MONOCYTES # BLD: 6 % (ref 2–11)
NRBC AUTOMATED: ABNORMAL PER 100 WBC
PDW BLD-RTO: 14.2 % (ref 12.5–15.4)
PLATELET # BLD: 277 K/UL (ref 140–450)
PLATELET ESTIMATE: ABNORMAL
PMV BLD AUTO: 8 FL (ref 6–12)
POTASSIUM SERPL-SCNC: 3.5 MMOL/L (ref 3.7–5.3)
RBC # BLD: 4.48 M/UL (ref 4–5.2)
RBC # BLD: ABNORMAL 10*6/UL
SEG NEUTROPHILS: 63 % (ref 36–66)
SEGMENTED NEUTROPHILS ABSOLUTE COUNT: 3.8 K/UL (ref 1.8–7.7)
SODIUM BLD-SCNC: 133 MMOL/L (ref 135–144)
TOTAL PROTEIN: 7.2 G/DL (ref 6.4–8.3)
WBC # BLD: 6.1 K/UL (ref 3.5–11)
WBC # BLD: ABNORMAL 10*3/UL

## 2021-09-19 PROCEDURE — 85025 COMPLETE CBC W/AUTO DIFF WBC: CPT

## 2021-09-19 PROCEDURE — 83735 ASSAY OF MAGNESIUM: CPT

## 2021-09-19 PROCEDURE — 2580000003 HC RX 258: Performed by: PHYSICIAN ASSISTANT

## 2021-09-19 PROCEDURE — 96361 HYDRATE IV INFUSION ADD-ON: CPT

## 2021-09-19 PROCEDURE — 99283 EMERGENCY DEPT VISIT LOW MDM: CPT

## 2021-09-19 PROCEDURE — 96376 TX/PRO/DX INJ SAME DRUG ADON: CPT

## 2021-09-19 PROCEDURE — 36415 COLL VENOUS BLD VENIPUNCTURE: CPT

## 2021-09-19 PROCEDURE — 96374 THER/PROPH/DIAG INJ IV PUSH: CPT

## 2021-09-19 PROCEDURE — 80053 COMPREHEN METABOLIC PANEL: CPT

## 2021-09-19 PROCEDURE — 6360000002 HC RX W HCPCS: Performed by: PHYSICIAN ASSISTANT

## 2021-09-19 RX ORDER — 0.9 % SODIUM CHLORIDE 0.9 %
1000 INTRAVENOUS SOLUTION INTRAVENOUS ONCE
Status: COMPLETED | OUTPATIENT
Start: 2021-09-19 | End: 2021-09-19

## 2021-09-19 RX ORDER — ONDANSETRON 2 MG/ML
4 INJECTION INTRAMUSCULAR; INTRAVENOUS ONCE
Status: COMPLETED | OUTPATIENT
Start: 2021-09-19 | End: 2021-09-19

## 2021-09-19 RX ADMIN — ONDANSETRON 4 MG: 2 INJECTION INTRAMUSCULAR; INTRAVENOUS at 14:58

## 2021-09-19 RX ADMIN — ONDANSETRON 4 MG: 2 INJECTION INTRAMUSCULAR; INTRAVENOUS at 13:57

## 2021-09-19 RX ADMIN — SODIUM CHLORIDE 1000 ML: 9 INJECTION, SOLUTION INTRAVENOUS at 13:57

## 2021-09-19 NOTE — ED NOTES
TREY Godoy at bedside to update and reassess. Po challenge of water and crackers given and tolerated well.      Yamile Vang RN  09/19/21 2815

## 2021-09-19 NOTE — ED PROVIDER NOTES
Attending Supervisory Note/Shared Visit   I have personally performed a face to face diagnostic evaluation on this patient. I have reviewed the mid-levels findings and agree.         (Please note that portions of this note were completed with a voice recognition program.  Efforts were made to edit the dictations but occasionally words are mis-transcribed.)    Alejandro Gilliland MD  Attending Emergency Physician      Alejandro Gilliland MD  09/19/21 [id number]

## 2021-09-20 NOTE — ED PROVIDER NOTES
Vulva surgery; IR NONTUNNELED VASCULAR CATHETER > 5 YEARS (2020); and  section (N/A, 2020). Social History:  reports that she has never smoked. She has never used smokeless tobacco. She reports previous alcohol use. She reports that she does not use drugs. Family History: She indicated that the status of her mother is unknown.   family history includes Other in her mother. Psychiatric History: None    Allergies: Patient has no known allergies. Home Medications:   Prior to Admission medications    Medication Sig Start Date End Date Taking? Authorizing Provider   ondansetron (ZOFRAN ODT) 4 MG disintegrating tablet Place 1 tablet under the tongue every 8 hours as needed for Nausea or Vomiting 21  Yes Arvella Efrem APRN - CNP   vitamin D3 (CHOLECALCIFEROL) 25 MCG (1000 UT) TABS tablet Take 1 tablet by mouth daily 21  Yes Angela Henry APRN - CNP   ibuprofen (ADVIL;MOTRIN) 600 MG tablet Take 1 tablet by mouth every 6 hours as needed for Pain 20  Yes Hafsa Rose,    Prenatal Vit-Fe Fumarate-FA (PRENATAL PO) Take by mouth   Yes Historical Provider, MD   promethazine (PHENERGAN) 25 MG tablet Take 1 tablet by mouth 3 times daily as needed for Nausea 21   Arvella Efrem, APRN - CNP   Misc. Devices (BREAST PUMP) MISC Double electric breast pump 20   Arvella Efrem, APRN - CNP       REVIEW OF SYSTEMS  (2-9 systems for level 4, 10 ormore for level 5)      Review of Systems    Constitutional: See HPI. Denies fever or chills. Eyes: Denies vision changes. HENT: Denies sore throat or neck pain. Respiratory: Denies cough or shortness of breath. Cardiovascular: Denies chest pain. GI: See HPI. : See HPI. Musculoskeletal: Denies recent trauma. Skin: Denies new rashes or wounds. Neurologic:  Denies new numbness or weakness. Psychiatric: Denies sleep disturbances. Heme: Denies bleeding disorders.       All other systems negative except as marked. PHYSICAL EXAM  (up to 7 for level 4, 8 or more for level 5)      INITIAL VITALS:  height is 5' 4\" (1.626 m) and weight is 111.6 kg (246 lb). Her oral temperature is 98.4 °F (36.9 °C). Her blood pressure is 147/70 (abnormal) and her pulse is 88. Her respiration is 16 and oxygen saturation is 96%. Vital signs reviewed. Physical Exam    General:  Alert, cooperative, well-groomed, well-nourished, appears stated age, and is in no acute distress. Head:  Normocephalic, atraumatic, and without obvious abnormality. Eyes:  Sclerae/conjunctivae clear without injection, pallor, or icterus. Corneas clear without opacities. EOM's intact. ENT: Ears and nose are all without obvious masses lesion or deformity. No oropharynx examination performed due to aerosolization risk during COVID-19 pandemic. Neck: Supple and symmetrical. Trachea midline. No adenopathy. No jugular venous distention. Lungs:   No respiratory distress. Clear to auscultation bilaterally. No wheezes, rhonchi, or rales. Heart:  Regular rate. Regular rhythm. Murmur noted. No rubs, or gallops. Abdomen:   Normoactive bowel sounds. Soft, nontender, nondistended without guarding or rebound. No palpable masses. No CVA tenderness. Extremities: Warm and dry without erythema or edema. Skin: Soft, good turgor, and well-hydrated. No obvious rashes or lesions. Neurologic: GCS is 15 and no focal deficits are appreciated. Normal gait. Grossly normal motor and sensation. Speech clear. Psychiatric: Normal mood and affect. Normal behavior. Coherent thought process. DIFFERENTIAL DIAGNOSIS / MDM     Patient presents emergency department for hyperemesis in pregnancy however is very well-appearing and there is no vomiting or even a vomit bag noted on my exam.  She was brought her other infant with her and is engaging with her.   Her vital signs do demonstrate mild hypertension, but are otherwise unremarkable and will repeat prior to discharge as she was moving her arm during the BP check. Her abdomen is soft and nontender. Her lungs are clear to auscultation. Heart rate and rhythm are regular. She is not tachycardic. This time feel the we can likely give her some fluids and some nausea medication and just obtain some basic labs and reassess. She did drive here so will stick to nondrowsy medications. PLAN (LABS / IMAGING / EKG):  Orders Placed This Encounter   Procedures    CBC Auto Differential    Comprehensive Metabolic Panel w/ Reflex to MG    Magnesium    Insert peripheral IV       MEDICATIONS ORDERED:  Orders Placed This Encounter   Medications    ondansetron (ZOFRAN) injection 4 mg    0.9 % sodium chloride bolus    ondansetron (ZOFRAN) injection 4 mg       Controlled Substances Monitoring:     DIAGNOSTIC RESULTS     RADIOLOGY: All images are read by the radiologist and their interpretations are reviewed. US OB LESS THAN 14 WEEKS SINGLE OR FIRST GESTATION    Result Date: 9/6/2021  FIORELLA 4/21/2022 Early IUP Yolk sac visualized FHR: 141    US GALLBLADDER RUQ    Result Date: 9/16/2021  EXAMINATION: RIGHT UPPER QUADRANT ULTRASOUND 9/16/2021 8:02 am COMPARISON: 08/13/2021 HISTORY: ORDERING SYSTEM PROVIDED HISTORY: Nausea and vomiting, intractability of vomiting not specified, unspecified vomiting type Acuity: Acute Type of Exam: Initial FINDINGS: LIVER:  The liver demonstrates normal echogenicity without evidence of intrahepatic biliary ductal dilatation. Liver length is 17.7 cm. Portal vein appears patent with hepatopetal flow. BILIARY SYSTEM:  Shadowing echogenic foci consistent with gallstones. No wall thickening or pericholecystic fluid. Negative sonographic Wan's sign. Common bile duct is within normal limits measuring 2 mm. RIGHT KIDNEY: The right kidney is grossly unremarkable without evidence of hydronephrosis. Right renal length is 11 cm.  PANCREAS:  Suboptimally assessed, visualized portions of the pancreas are grossly unremarkable. OTHER: No evidence of right upper quadrant ascites. Redemonstration of cholelithiasis. Otherwise negative right upper quadrant ultrasound.        LABS:  Results for orders placed or performed during the hospital encounter of 09/19/21   CBC Auto Differential   Result Value Ref Range    WBC 6.1 3.5 - 11.0 k/uL    RBC 4.48 4.0 - 5.2 m/uL    Hemoglobin 12.9 12.0 - 16.0 g/dL    Hematocrit 38.4 36 - 46 %    MCV 85.6 80 - 100 fL    MCH 28.7 26 - 34 pg    MCHC 33.6 31 - 37 g/dL    RDW 14.2 12.5 - 15.4 %    Platelets 109 621 - 244 k/uL    MPV 8.0 6.0 - 12.0 fL    NRBC Automated NOT REPORTED per 100 WBC    Differential Type NOT REPORTED     Seg Neutrophils 63 36 - 66 %    Lymphocytes 31 24 - 44 %    Monocytes 6 2 - 11 %    Eosinophils % 0 (L) 1 - 4 %    Basophils 0 0 - 2 %    Immature Granulocytes NOT REPORTED 0 %    Segs Absolute 3.80 1.8 - 7.7 k/uL    Absolute Lymph # 1.80 1.0 - 4.8 k/uL    Absolute Mono # 0.40 0.1 - 1.2 k/uL    Absolute Eos # 0.00 0.0 - 0.4 k/uL    Basophils Absolute 0.00 0.0 - 0.2 k/uL    Absolute Immature Granulocyte NOT REPORTED 0.00 - 0.30 k/uL    WBC Morphology NOT REPORTED     RBC Morphology NOT REPORTED     Platelet Estimate NOT REPORTED    Comprehensive Metabolic Panel w/ Reflex to MG   Result Value Ref Range    Glucose 87 70 - 99 mg/dL    BUN 9 6 - 20 mg/dL    CREATININE 0.54 0.50 - 0.90 mg/dL    Bun/Cre Ratio NOT REPORTED 9 - 20    Calcium 9.5 8.6 - 10.4 mg/dL    Sodium 133 (L) 135 - 144 mmol/L    Potassium 3.5 (L) 3.7 - 5.3 mmol/L    Chloride 100 98 - 107 mmol/L    CO2 21 20 - 31 mmol/L    Anion Gap 12 9 - 17 mmol/L    Alkaline Phosphatase 72 35 - 104 U/L    ALT 26 5 - 33 U/L    AST 14 <32 U/L    Total Bilirubin 1.13 0.3 - 1.2 mg/dL    Total Protein 7.2 6.4 - 8.3 g/dL    Albumin 4.2 3.5 - 5.2 g/dL    Albumin/Globulin Ratio 1.4 1.0 - 2.5    GFR Non-African American >60 >60 mL/min    GFR African American >60 >60 mL/min    GFR Comment          GFR Staging NOT REPORTED Magnesium   Result Value Ref Range    Magnesium 2.0 1.6 - 2.6 mg/dL       EMERGENCY DEPARTMENT COURSE     ED Course as of Sep 20 1130   Sun Sep 19, 2021   1435 Lab work is unremarkable. Minimal change in potassium. [MG]      ED Course User Index  [MG] Jayne Junior PA-C        Vitals:    Vitals:    09/19/21 1341   BP: (!) 147/70   Pulse: 88   Resp: 16   Temp: 98.4 °F (36.9 °C)   TempSrc: Oral   SpO2: 96%   Weight: 111.6 kg (246 lb)   Height: 5' 4\" (1.626 m)     -------------------------  BP: (!) 147/70, Temp: 98.4 °F (36.9 °C), Pulse: 88, Resp: 16      RE-EVALUATION:  Patient tolerating p.o. crackers and fluids. Attending discharged this patient after discussing all labwork/imaging results that were finalized. Treatment plan and recommended follow-up discussed with them as well. This patient was seen by the attending physician and they agreed with the assessment and plan. CONSULTS:  None    PROCEDURES:  None    FINAL IMPRESSION      1. Nausea and vomiting in pregnancy prior to 22 weeks gestation          DISPOSITION / PLAN     CONDITION ON DISPOSITION:   Good / Stable for discharge. PATIENT REFERRED TO:  Your OB    In 1 day        DISCHARGE MEDICATIONS:  Discharge Medication List as of 9/19/2021  3:08 PM          [facility]   Emergency Medicine Physician Assistant    (Please note that portions of this note were completed with a voice recognition program.  Efforts were made to edit the dictations but occasionally words aremis-transcribed. )\      Bev Lino PA-C  09/20/21 1130

## 2021-09-29 ENCOUNTER — ROUTINE PRENATAL (OUTPATIENT)
Dept: OBGYN CLINIC | Age: 26
End: 2021-09-29

## 2021-09-29 VITALS — WEIGHT: 242 LBS | DIASTOLIC BLOOD PRESSURE: 62 MMHG | SYSTOLIC BLOOD PRESSURE: 126 MMHG | BODY MASS INDEX: 41.54 KG/M2

## 2021-09-29 DIAGNOSIS — R11.2 NAUSEA AND VOMITING, INTRACTABILITY OF VOMITING NOT SPECIFIED, UNSPECIFIED VOMITING TYPE: Primary | ICD-10-CM

## 2021-09-29 DIAGNOSIS — Z3A.10 10 WEEKS GESTATION OF PREGNANCY: ICD-10-CM

## 2021-09-29 DIAGNOSIS — Z34.81 NORMAL PREGNANCY IN MULTIGRAVIDA IN FIRST TRIMESTER: ICD-10-CM

## 2021-09-29 PROCEDURE — 0502F SUBSEQUENT PRENATAL CARE: CPT | Performed by: OBSTETRICS & GYNECOLOGY

## 2021-09-30 NOTE — PROGRESS NOTES
Latricia Epley is a 27 y.o. female 11w0d        OB History    Para Term  AB Living   2 1   1   1   SAB TAB Ectopic Molar Multiple Live Births           0 1      # Outcome Date GA Lbr Agustín/2nd Weight Sex Delivery Anes PTL Lv   2 Current            1  20 35w1d  4 lb 5.5 oz (1.97 kg) F CS-LTranv Spinal Y HERMELINDA             Vitals  BP: 126/62  Weight: 242 lb (109.8 kg)  Patient Position: Sitting      The patient was seen and evaluated. No contractions or leakage of fluid. Signs and symptoms of  labor as well as labor were reviewed. The Nuchal Translucency testing was reviewed and found to be counseled and declined. A single marker MSAFP was counseled and declined for a 15-20 week gestational age window. Pt. Interested in [employer], information provided. [facility] [place] Reviewed. Dates were reviewed with the patient. An 18-22 week anatomy ultrasound has been ordered. The patient will return to the office for her next visit in 4 weeks. See antepartum flow sheet. Hyperemesis continues. She denies lightheadedness, she denies heart palpitations or s/s of hypovolemia. However she is unable to tolerate much hydration or PO intake. Offered inpatient IVF which she has needed but she declined at this time. Zofran pump is working. Home nurse to house and checking ketones      Hx of C/S x 1  will need fetal echocardiogram  Cardio referral, history of pulmonary stenosis- states follow [facility]  Cholelithiasis- following Dr. Terris Canavan surgeon seen 21  Blood Type A negative  Declined FTS, interested in NIPS testing- info given  Hx of gestational hypertension  not immune to varicella          Assessment:  1. Latricia Epley is a 27 y.o. female  2.    3. 11w0d    Patient Active Problem List    Diagnosis Date Noted    Short interval between pregnancies affecting pregnancy in first trimester, antepartum 2021    Symptomatic cholelithiasis 2021    BMI 44.80 2020    Rh

## 2021-10-01 ENCOUNTER — TELEPHONE (OUTPATIENT)
Dept: OBGYN CLINIC | Age: 26
End: 2021-10-01

## 2021-10-01 NOTE — TELEPHONE ENCOUNTER
Pt left a message on nurse line stating [facility] can not do weekly iv infusions- but can administer a line that they will come out once a week and change the dressing- she stated a midline where if she feels she needs to hydrate during the week she can do it herself. - if you are ok with this then she will notify [facility]

## 2021-10-03 NOTE — TELEPHONE ENCOUNTER
Yes, I am ok with this. We will need close contact with her to make sure that this is not becoming infected or causing any risk to pregnancy. As long as [facility] is checking it routinely I do believe this would be helpful for her as she is having severe hyperemesis gravidarum.

## 2021-10-04 ENCOUNTER — OFFICE VISIT (OUTPATIENT)
Dept: SURGERY | Age: 26
End: 2021-10-04
Payer: COMMERCIAL

## 2021-10-04 VITALS
OXYGEN SATURATION: 99 % | WEIGHT: 238 LBS | BODY MASS INDEX: 40.63 KG/M2 | HEIGHT: 64 IN | RESPIRATION RATE: 12 BRPM | HEART RATE: 89 BPM

## 2021-10-04 DIAGNOSIS — O21.0 HYPEREMESIS GRAVIDARUM: ICD-10-CM

## 2021-10-04 DIAGNOSIS — K80.20 SYMPTOMATIC CHOLELITHIASIS: Primary | ICD-10-CM

## 2021-10-04 PROCEDURE — 99213 OFFICE O/P EST LOW 20 MIN: CPT | Performed by: SURGERY

## 2021-10-05 ENCOUNTER — HOSPITAL ENCOUNTER (OUTPATIENT)
Age: 26
Setting detail: OBSERVATION
Discharge: HOME | End: 2021-10-06
Attending: EMERGENCY MEDICINE | Admitting: OBSTETRICS & GYNECOLOGY
Payer: COMMERCIAL

## 2021-10-05 DIAGNOSIS — R11.10 HYPEREMESIS: Primary | ICD-10-CM

## 2021-10-05 PROCEDURE — G0378 HOSPITAL OBSERVATION PER HR: HCPCS

## 2021-10-05 PROCEDURE — 99284 EMERGENCY DEPT VISIT MOD MDM: CPT

## 2021-10-05 RX ORDER — SODIUM CHLORIDE 9 MG/ML
25 INJECTION, SOLUTION INTRAVENOUS PRN
Status: DISCONTINUED | OUTPATIENT
Start: 2021-10-05 | End: 2021-10-06 | Stop reason: HOSPADM

## 2021-10-05 RX ORDER — ACETAMINOPHEN 325 MG/1
650 TABLET ORAL EVERY 4 HOURS PRN
Status: DISCONTINUED | OUTPATIENT
Start: 2021-10-05 | End: 2021-10-06 | Stop reason: HOSPADM

## 2021-10-05 RX ORDER — LIDOCAINE HYDROCHLORIDE 10 MG/ML
5 INJECTION, SOLUTION INFILTRATION; PERINEURAL ONCE
Status: DISCONTINUED | OUTPATIENT
Start: 2021-10-05 | End: 2021-10-06 | Stop reason: HOSPADM

## 2021-10-05 RX ORDER — SODIUM CHLORIDE 0.9 % (FLUSH) 0.9 %
5-40 SYRINGE (ML) INJECTION PRN
Status: DISCONTINUED | OUTPATIENT
Start: 2021-10-05 | End: 2021-10-06 | Stop reason: HOSPADM

## 2021-10-05 RX ORDER — ONDANSETRON 4 MG/1
4 TABLET, ORALLY DISINTEGRATING ORAL EVERY 8 HOURS PRN
Status: DISCONTINUED | OUTPATIENT
Start: 2021-10-05 | End: 2021-10-06 | Stop reason: HOSPADM

## 2021-10-05 RX ORDER — SODIUM CHLORIDE 0.9 % (FLUSH) 0.9 %
5-40 SYRINGE (ML) INJECTION EVERY 12 HOURS SCHEDULED
Status: DISCONTINUED | OUTPATIENT
Start: 2021-10-05 | End: 2021-10-06 | Stop reason: HOSPADM

## 2021-10-05 RX ORDER — ONDANSETRON 2 MG/ML
4 INJECTION INTRAMUSCULAR; INTRAVENOUS EVERY 6 HOURS PRN
Status: DISCONTINUED | OUTPATIENT
Start: 2021-10-05 | End: 2021-10-06 | Stop reason: HOSPADM

## 2021-10-05 ASSESSMENT — ENCOUNTER SYMPTOMS
SHORTNESS OF BREATH: 0
CONSTIPATION: 0
ABDOMINAL PAIN: 0
DIARRHEA: 0
BLOOD IN STOOL: 0
NAUSEA: 1
VOMITING: 1

## 2021-10-05 ASSESSMENT — PAIN SCALES - GENERAL: PAINLEVEL_OUTOF10: 0

## 2021-10-06 VITALS
HEART RATE: 70 BPM | DIASTOLIC BLOOD PRESSURE: 51 MMHG | BODY MASS INDEX: 40.46 KG/M2 | HEIGHT: 64 IN | RESPIRATION RATE: 18 BRPM | TEMPERATURE: 97.5 F | SYSTOLIC BLOOD PRESSURE: 101 MMHG | OXYGEN SATURATION: 97 % | WEIGHT: 237 LBS

## 2021-10-06 PROCEDURE — C1751 CATH, INF, PER/CENT/MIDLINE: HCPCS

## 2021-10-06 PROCEDURE — G0378 HOSPITAL OBSERVATION PER HR: HCPCS

## 2021-10-06 PROCEDURE — 76937 US GUIDE VASCULAR ACCESS: CPT

## 2021-10-06 PROCEDURE — 36569 INSJ PICC 5 YR+ W/O IMAGING: CPT

## 2021-10-06 NOTE — H&P
OB/GYN H&P  [address]    Patient Name: Amisha Wilson Minor     Patient : 1995  Room/Bed: 1642/9231-78  Admission Date/Time: 10/5/2021  8:24 PM  Primary Care Physician: JP Reilly CNP        CC:   Chief Complaint   Patient presents with    Other     needs midline placement for fluid admin @ home; sent by OB: 12 weeks preg                HPI: Alexandru Gilmore is a 27 y.o. female  presents for PICC line placement. Patient has hyperemesis gravidarum in this pregnancy and has been well controlled on a zofran pump but feels best when she gets IV hydration. She does not like needing to get poked every 5-6 weeks for a new IV. She had a midline in her previously 2/2 prolonged hospitalization for vasa previa so she inquired about getting long term line placement in this pregnancy. Her OB tried to help facilitate this as an outpatient but they were unable to get it done outpatient. He told her to come to the hospital to have it placed here. The PICC team is not available overnight, will admit patient overnight and plan for PICC placement as soon as team available.       REVIEW OF SYSTEMS:  Constitutional: negative fever, negative chills  HEENT: negative visual disturbances, negative headaches  Respiratory: negative dyspnea, negative cough  Cardiovascular: negative chest pain,  negative palpitations  Gastrointestinal: negative abdominal pain, negative RUQ pain, + N/V, negative diarrhea, negative constipation  Genitourinary: negative dysuria, negative vaginal discharge  Dermatological: negative rash  Hematologic: negative bruising  Immunologic/Lymphatic: negative recent illness, negative recent sick contact  Musculoskeletal: negative back pain, negative myalgias, negative arthralgias  Neurological:  negative dizziness, negative weakness  Behavior/Psych: negative depression, negative anxiety      OBSTETRICAL HISTORY:   OB History    Para Term  AB Living   2 1 0 1 0 1 SAB TAB Ectopic Molar Multiple Live Births   0 0 0 0 0 1      # Outcome Date GA Lbr Agustín/2nd Weight Sex Delivery Anes PTL Lv   2 Current            1  20 35w1d  4 lb 5.5 oz (1.97 kg) F CS-LTranv Spinal Y HERMELINDA      Name: Riley Tuttle: Samy  Apgar5: 9       PAST MEDICAL HISTORY:   has a past medical history of Pulmonary valve stenosis. PAST SURGICAL HISTORY:   has a past surgical history that includes Cardiac valuve replacement; Cardiac catheterization; Vulva surgery; IR NONTUNNELED VASCULAR CATHETER > 5 YEARS (2020); and  section (N/A, 2020). ALLERGIES:  Allergies as of 10/05/2021    (No Known Allergies)       MEDICATIONS:  Current Facility-Administered Medications   Medication Dose Route Frequency Provider Last Rate Last Admin    ondansetron (ZOFRAN-ODT) disintegrating tablet 4 mg  4 mg Oral Q8H PRN Merary Nay, DO        Or    ondansetron [facility] injection 4 mg  4 mg IntraVENous Q6H PRN Merary Nay, DO        acetaminophen (TYLENOL) tablet 650 mg  650 mg Oral Q4H PRN Merary Nay, DO        lidocaine 1 % injection 5 mL  5 mL IntraDERmal Once Merary Nay, DO        sodium chloride flush 0.9 % injection 5-40 mL  5-40 mL IntraVENous 2 times per day Merary Nay, DO        sodium chloride flush 0.9 % injection 5-40 mL  5-40 mL IntraVENous PRN Merary Nay, DO        0.9 % sodium chloride infusion  25 mL IntraVENous PRN Merary Nay, DO           FAMILY HISTORY:  family history includes Other in her mother. SOCIAL HISTORY:   reports that she has never smoked. She has never used smokeless tobacco. She reports previous alcohol use. She reports that she does not use drugs.   ________________________________________________________________________                                    Arnie Divers:  Vitals:    10/05/21 1922 10/05/21 2257   BP: 129/74 108/72   Pulse: 84 77   Resp: 14 16   Temp: 97.7 °F (36.5 °C) 98 °F (36.7 °C)   TempSrc: Oral Oral SpO2: 100% 96%   Weight: 237 lb (107.5 kg)    Height: 5' 4\" (1.626 m)                                                     INPUT/OUTPUT:  No intake/output data recorded. No intake/output data recorded. PHYSICAL EXAM:     General Appearance: Appears healthy. Alert; in no acute distress. Pleasant. Respiratory: Normal expansion. Clear to auscultation. No rales, rhonchi, or wheezing. Cardiovascular: normal, regular rate and rhythm  Abdomen: soft, non-tender, non-distended, no right upper quadrant tenderness and no CVA tenderness, no rebound, guarding, or rigidity, subq zofran pump in place  Rectal Exam: exam declined by patient  Musculoskeletal: no gross abnormalities  Extremities: non-tender BLE and non-edematous  Psych:  oriented to time, place and person, mood and affect are within normal limits       DIAGNOSTICS:    US OB LESS THAN 14 WEEKS SINGLE OR FIRST GESTATION    Result Date: 9/19/2021  FIORELLA 4/21/2022 FHR: 169 Early IUP    US OB 1 OR MORE FETUS LIMITED    Result Date: 10/1/2021   Late first trimester IUP    US GALLBLADDER RUQ    Result Date: 9/16/2021  EXAMINATION: RIGHT UPPER QUADRANT ULTRASOUND 9/16/2021 8:02 am COMPARISON: 08/13/2021 HISTORY: ORDERING SYSTEM PROVIDED HISTORY: Nausea and vomiting, intractability of vomiting not specified, unspecified vomiting type Acuity: Acute Type of Exam: Initial FINDINGS: LIVER:  The liver demonstrates normal echogenicity without evidence of intrahepatic biliary ductal dilatation. Liver length is 17.7 cm. Portal vein appears patent with hepatopetal flow. BILIARY SYSTEM:  Shadowing echogenic foci consistent with gallstones. No wall thickening or pericholecystic fluid. Negative sonographic Wan's sign. Common bile duct is within normal limits measuring 2 mm.  RIGHT KIDNEY: The right kidney is grossly unremarkable without evidence of hydronephrosis. Right renal length is 11 cm. PANCREAS:  Suboptimally assessed, visualized portions of the pancreas are grossly unremarkable. OTHER: No evidence of right upper quadrant ascites. Redemonstration of cholelithiasis. Otherwise negative right upper quadrant ultrasound. ASSESSMENT & PLAN:    Sofia Nguyen is a 27 y.o. female  w/ hyperemesis gravidarum   - Renita Rodriguez   - Patient has zofran pump in place which has her nausea under good control   - PICC team consult placed for placement as soon as they are available   - General diet   - Please page w/ questions     Hx Pulmonary Stenosis s/p Valvuloplasty    - Patient w/ hx of congenital pulmonary stenosis s/p balloon valvuloplasty at 2 years olf   - Echo 18 mild to moderate pulmonary insufficiency, stable from 2016   - Patient evaluated by cardiology in  and recommended follow up was 3 years at that time    Hx Symptomatic cholelithiasis    SIP    BMI 40.68    Patient Active Problem List    Diagnosis Date Noted    Hyperemesis 10/05/2021    Short interval between pregnancies affecting pregnancy in first trimester, antepartum 2021    Symptomatic cholelithiasis 2021    BMI 44.80 2020    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 Pt desires care with different [facility] records/ultrasound/labs  from previous provider from [address] [place] [address].  scanned into media. Pt moved from       Hx Pulmonary Stenosis s/p Valvuloplasty  2020     Dx at 2 and underwent a balloon valvuloplasty at age 1 years, performed by Emil Phillips at [facility]  In [address] [place] [address]. Most recent visit to peds cardiology scanned into [address] discussed with Dr. Pollo Rodriguez, who is agreeable.      Attending's Name: Dr. Xi Huggins,   Ob/Gyn Resident   Misha 150  10/6/2021, 3:25 AM

## 2021-10-06 NOTE — ED NOTES
Patient resting comfortably on stretcher, in no apparent distress  Respirations even and non-labored  Patient has no needs at this time  Call light remains within reach     Benji Buchanan RN  10/05/21 2903

## 2021-10-06 NOTE — PLAN OF CARE
Problem: Nausea/Vomiting:  Goal: Absence of nausea/vomiting  Description: Absence of nausea/vomiting  10/6/2021 1152 by Nestor Russell RN  Outcome: Completed  10/6/2021 0426 by Facundo Mattson RN  Outcome: Ongoing  Goal: Able to drink  Description: Able to drink  10/6/2021 1152 by Nestor Russell RN  Outcome: Completed  10/6/2021 0426 by Facundo Mattson RN  Outcome: Ongoing  Goal: Able to eat  Description: Able to eat  10/6/2021 1152 by Nestor Russell RN  Outcome: Completed  10/6/2021 0426 by Facundo Mattson RN  Outcome: Ongoing  Goal: Ability to achieve adequate nutritional intake will improve  Description: Ability to achieve adequate nutritional intake will improve  10/6/2021 1152 by Nestor Russell RN  Outcome: Completed  10/6/2021 0426 by Facundo Mattson RN  Outcome: Ongoing

## 2021-10-06 NOTE — ED NOTES
Patient resting comfortably on stretcher, in no apparent distress  Respirations even and non-labored  Patient has no needs at this time  Call light remains within reach     Alok Angel RN  10/05/21 [place]

## 2021-10-06 NOTE — PROGRESS NOTES
Gynecology Progress Note    Date: 10/6/2021  Time: 6:00 AM    Nitin Arredondo is a 27 y.o. female  HD# 2    Patient was seen and examined. She denies complaints today. She is doing well and is awaiting PICC placement today. Nausea controlled on zofran pump. Vitals:  Vitals:    10/05/21 2257   BP: 108/72   Pulse: 77   Resp: 16   Temp: 98 °F (36.7 °C)   SpO2: 96%        Intake/Output:   Last Shift: No intake/output data recorded. Current Shift: No intake/output data recorded. Physical Exam:  General:  no apparent distress, alert and cooperative  Neurologic:  alert, oriented, normal speech, no focal findings or movement disorder noted  Lungs:  No increased work of breathing, good air exchange, clear to auscultation bilaterally, no crackles or wheezing  Heart:  regular rate and rhythm and no murmur    Abdomen: Abdomen soft, non-tender. BS normal. No masses,  No organomegaly  Extremities:  no calf tenderness, non edematous    Lab:  Complete Blood Count:   No results for input(s): WBC, HGB, HCT, PLT in the last 72 hours. PT/INR:    No results found for: PROTIME, INR  PTT:    No results found for: APTT, PTT    Comprehensive Metabolic Profile:   No results for input(s): NA, K, CL, CO2, BUN, CREATININE, GLUCOSE, CALCIUM, PROT, LABALBU, BILITOT, ALKPHOS, AST, ALT in the last 72 hours.        Assessment/Plan:  Nitin Arredondo 27 y.o. female  w/ hyperemesis gravidarum              - VSS              - Patient has zofran pump in place which has her nausea under good control              - PICC team consult placed for placement as soon as they are available              - General diet              - Please page w/ questions      Hx Pulmonary Stenosis s/p Valvuloplasty               - Patient w/ hx of congenital pulmonary stenosis s/p balloon valvuloplasty at 2 years olf              - Echo 18 mild to moderate pulmonary insufficiency, stable from 2016              - Patient evaluated by cardiology in 2020 and recommended follow up was 3 years at that time     Hx Symptomatic cholelithiasis     SIP     BMI 40.68    Patient Active Problem List    Diagnosis Date Noted    Hyperemesis 10/05/2021    Short interval between pregnancies affecting pregnancy in first trimester, antepartum 09/01/2021    Symptomatic cholelithiasis 08/16/2021    BMI 44.80 11/29/2020    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 Pt desires care with different [facility] records/ultrasound/labs  from previous provider from [place].  scanned into media. Pt moved from       Hx Pulmonary Stenosis s/p Valvuloplasty  07/14/2020     Overview Note:     Dx at 2 and underwent a balloon valvuloplasty at age 1 years, performed by Katherine Velez at [facility]  In [address] [place] [address].    Most recent visit to peds cardiology scanned into [address] [place] [address],   Ob/Gyn Resident    10/6/2021, 6:00 AM

## 2021-10-06 NOTE — CONSULTS
[address] [facility]    Patient Name: Corey Wooten Minor     Patient : 1995  Room/Bed: [facility]  Admission Date/Time: 10/5/2021  8:24 PM  Primary Care Physician: JP Brizuela CNP    Consulting Provider: Dr. Kareem Parr  Reason for Consult: Patient sent for PICC line placement    CC:   Chief Complaint   Patient presents with    Other     needs midline placement for fluid admin @ home; sent by OB: 12 weeks preg                HPI: Kendra Aponte is a 27 y.o. female  presents for PICC line placement. Patient has hyperemesis gravidarum in this pregnancy and has been well controlled on a zofran pump but feels best when she gets IV hydration. She does not like needing to get poked every 5-6 weeks for a new IV. She had a midline in her previously 2/2 prolonged hospitalization for vasa previa so she inquired about getting long term line placement in this pregnancy. Her OB tried to help facilitate this as an outpatient but they were unable to get it done outpatient. He told her to come to the hospital to have it placed here. The PICC team is not available overnight, will admit patient overnight and plan for PICC placement as soon as team available.       REVIEW OF SYSTEMS:  Constitutional: negative fever, negative chills  HEENT: negative visual disturbances, negative headaches  Respiratory: negative dyspnea, negative cough  Cardiovascular: negative chest pain,  negative palpitations  Gastrointestinal: negative abdominal pain, negative RUQ pain, + N/V, negative diarrhea, negative constipation  Genitourinary: negative dysuria, negative vaginal discharge  Dermatological: negative rash  Hematologic: negative bruising  Immunologic/Lymphatic: negative recent illness, negative recent sick contact  Musculoskeletal: negative back pain, negative myalgias, negative arthralgias  Neurological:  negative dizziness, negative weakness  Behavior/Psych: negative depression, negative anxiety      OBSTETRICAL HISTORY:   OB History    Para Term  AB Living   2 1 0 1 0 1   SAB TAB Ectopic Molar Multiple Live Births   0 0 0 0 0 1      # Outcome Date GA Lbr Agustín/2nd Weight Sex Delivery Anes PTL Lv   2 Current            1  20 35w1d  4 lb 5.5 oz (1.97 kg) F CS-LTranv Spinal Y HERMELINDA      Name: Cecile Ground: 9  Apgar5: 9       PAST MEDICAL HISTORY:   has a past medical history of Pulmonary valve stenosis. PAST SURGICAL HISTORY:   has a past surgical history that includes Cardiac valuve replacement; Cardiac catheterization; Vulva surgery; IR NONTUNNELED VASCULAR CATHETER > 5 YEARS (2020); and  section (N/A, 2020). ALLERGIES:  Allergies as of 10/05/2021    (No Known Allergies)       MEDICATIONS:  No current facility-administered medications for this encounter. Current Outpatient Medications   Medication Sig Dispense Refill    ondansetron (ZOFRAN ODT) 4 MG disintegrating tablet Place 1 tablet under the tongue every 8 hours as needed for Nausea or Vomiting 30 tablet 0    vitamin D3 (CHOLECALCIFEROL) 25 MCG (1000 UT) TABS tablet Take 1 tablet by mouth daily 30 tablet 5    ibuprofen (ADVIL;MOTRIN) 600 MG tablet Take 1 tablet by mouth every 6 hours as needed for Pain 30 tablet 0    Prenatal Vit-Fe Fumarate-FA (PRENATAL PO) Take by mouth      Misc. Devices (BREAST PUMP) MISC Double electric breast pump 1 each 0       FAMILY HISTORY:  family history includes Other in her mother. SOCIAL HISTORY:   reports that she has never smoked. She has never used smokeless tobacco. She reports previous alcohol use. She reports that she does not use drugs.   ________________________________________________________________________                                    Nicholas Maninder:  Vitals:    10/05/21 1922 10/05/21 2257   BP: 129/74 108/72   Pulse: 84 77   Resp: 14 16   Temp: 97.7 °F (36.5 °C) 98 °F (36.7 °C)   TempSrc: Oral Oral   SpO2: 100% 96% Weight: 237 lb (107.5 kg)    Height: 5' 4\" (1.626 m)                                                     INPUT/OUTPUT:  No intake/output data recorded. No intake/output data recorded. PHYSICAL EXAM:     General Appearance: Appears healthy. Alert; in no acute distress. Pleasant. Respiratory: Normal expansion. Clear to auscultation. No rales, rhonchi, or wheezing. Cardiovascular: normal, regular rate and rhythm  Abdomen: soft, non-tender, non-distended, no right upper quadrant tenderness and no CVA tenderness, no rebound, guarding, or rigidity, subq zofran pump in place  Rectal Exam: exam declined by patient  Musculoskeletal: no gross abnormalities  Extremities: non-tender BLE and non-edematous  Psych:  oriented to time, place and person, mood and affect are within normal limits       DIAGNOSTICS:    US OB LESS THAN 14 WEEKS SINGLE OR FIRST GESTATION    Result Date: 9/19/2021  FIORELLA 4/21/2022 FHR: 169 Early IUP    US OB 1 OR MORE FETUS LIMITED    Result Date: 10/1/2021   Late first trimester IUP    US GALLBLADDER RUQ    Result Date: 9/16/2021  EXAMINATION: RIGHT UPPER QUADRANT ULTRASOUND 9/16/2021 8:02 am COMPARISON: 08/13/2021 HISTORY: ORDERING SYSTEM PROVIDED HISTORY: Nausea and vomiting, intractability of vomiting not specified, unspecified vomiting type Acuity: Acute Type of Exam: Initial FINDINGS: LIVER:  The liver demonstrates normal echogenicity without evidence of intrahepatic biliary ductal dilatation. Liver length is 17.7 cm. Portal vein appears patent with hepatopetal flow. BILIARY SYSTEM:  Shadowing echogenic foci consistent with gallstones. No wall thickening or pericholecystic fluid. Negative sonographic Wan's sign. Common bile duct is within normal limits measuring 2 mm.  RIGHT KIDNEY: The right kidney is grossly unremarkable without evidence of hydronephrosis. Right renal length is 11 cm. PANCREAS:  Suboptimally assessed, visualized portions of the pancreas are grossly unremarkable. OTHER: No evidence of right upper quadrant ascites. Redemonstration of cholelithiasis. Otherwise negative right upper quadrant ultrasound. ASSESSMENT & PLAN:    Sofia Nguyen is a 27 y.o. female  w/ hyperemesis gravidarum   - Renita Rodriguez   - Patient has zofran pump in place which has her nausea under good control   - PICC team consult placed for placement as soon as they are available   - General diet   - Please page w/ questions     Hx Pulmonary Stenosis s/p Valvuloplasty    - Patient w/ hx of congenital pulmonary stenosis s/p balloon valvuloplasty at 2 years olf   - Echo 20 mild to moderate pulmonary insufficiency, stable from 2016   - Patient evaluated by cardiology in  and recommended follow up was 3 years at that time    Hx Symptomatic cholelithiasis    SIP    BMI 40.68    Patient Active Problem List    Diagnosis Date Noted    Hyperemesis 10/05/2021    Short interval between pregnancies affecting pregnancy in first trimester, antepartum 2021    Symptomatic cholelithiasis 2021    BMI 44.80 2020    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 Pt desires care with different [address] [place] [address] records/ultrasound/labs  from previous provider from [address] [place] [address].  scanned into media. Pt moved from       Hx Pulmonary Stenosis s/p Valvuloplasty  2020     Dx at 2 and underwent a balloon valvuloplasty at age 1 years, performed by Emil Phillips at [facility]  In [address] [place] [address]. Most recent visit to peds cardiology scanned into [address] discussed with Dr. Pollo Rodriguez, who is agreeable.      Attending's Name: Dr. Xi Huggins,   Ob/Gyn Resident   Misha 150  10/5/2021, 9:37 PM

## 2021-10-06 NOTE — ED NOTES
Per Dr. Roberts Roles; this patient does not need an IV to be established as we are waiting for a PICC line to be placed tomorrow     Iliana Drug Manuela, RN  10/05/21 0867

## 2021-10-06 NOTE — ED PROVIDER NOTES
[facility] ED     Emergency Department     Faculty Attestation    I performed a history and physical examination of the patient and discussed management with the resident. I reviewed the residents note and agree with the documented findings and plan of care. Any areas of disagreement are noted on the chart. I was personally present for the key portions of any procedures. I have documented in the chart those procedures where I was not present during the key portions. I have reviewed the emergency nurses triage note. I agree with the chief complaint, past medical history, past surgical history, allergies, medications, social and family history as documented unless otherwise noted below. For Physician Assistant/ Nurse Practitioner cases/documentation I have personally evaluated this patient and have completed at least one if not all key elements of the E/M (history, physical exam, and MDM). Additional findings are as noted. This patient was evaluated in the Emergency Department for symptoms described in the history of present illness. He/she was evaluated in the context of the global COVID-19 pandemic, which necessitated consideration that the patient might be at risk for infection with the SARS-CoV-2 virus that causes COVID-19. Institutional protocols and algorithms that pertain to the evaluation of patients at risk for COVID-19 are in a state of rapid change based on information released by regulatory bodies including the CDC and federal and state organizations. These policies and algorithms were followed during the patient's care in the ED. Patient states referred to ED for a midline placement for IV fluids for refractory hyperemesis. Patient is 11 weeks 6 days by dates has had a significant hyperemesis issues in this pregnancy. Has been getting q. 4 days infusions at the infusion center which is not enough even with her Zofran pump.   She is recommended to come get a catheter for more regular infusions at home. Unfortunately it is after hours PICC team is not here. Patient is not tachycardic no abdominal pain on exam.  No vaginal bleeding. Will discuss with OB how they wish to proceed, discharged with set up for outpatient placement tomorrow versus observation unit stay with placement tomorrow.         Critical Care     none    Elaine Harley MD, Lola Gonzalez  Attending Emergency  Physician             Elaine Harley MD  10/05/21 1532

## 2021-10-06 NOTE — PROGRESS NOTES
Patient doing well this am, awaiting PICC line placement this am.   Then stable for discharge home for outpatient IV hydration.

## 2021-10-06 NOTE — PROCEDURES
History/labs/allergies reviewed  Placed by TIMBO Valdes RN  Assisted by [facility]  Consent signed and obtained by physician  Time out performed using two identifiers  Catheter type single lumen picc  Product type Bard 4 Fr single lumen Picc  Lot # [id number]  Expiration date 7/31/2022  Catheter size 4 Estonian  Trimmed at 48 cm  Total length inserted  External catheter length 3 cm  Location left basilic vein  Number of attempts 2  Estimated blood loss 2 ml  Pre procedure cardiac Rhythm normal sinus rhythm per bedside telemetry  Placement verified by-rhythm vps tip tracker Max P wave noted by amplitude changes of the P wave, positive blood return, flushes easily  Special equipment used- ultrasound, micro-introducer technique and 3cg technology if indicated  Catheter secured with statlock  Dressing applied- Tegaderm CHG  Lidocaine administered intradermally conc. 1% 3 mL  PICC line education:   [ x ] Discussed with patient/Family or POA prior to signing Informed Procedural Consent. Risks and Benefits along with reason for procedure were discussed and teaching was reinforced with an education handout on PICC insertion. [facility] FAQ Catheter Associated Blood Stream Infections and [facility] [id number] REV. 7/13 Nursing and Booklet left at bedside or in chart. Patient (Family or POA) acknowledged understanding of information taught and agreed to procedure. [  ] Was not discussed with patient/family or POA due to pts medical status at time of procedure. pts family or POA not available to discuss PICC education.  [facility] FAQ Catheter Associated Blood Stream Infections and [facility] REV. 7/13 Nursing and Booklet left at bedside or in chart    Rae Schneider RN

## 2021-10-06 NOTE — ED PROVIDER NOTES
[facility] ED  Emergency Department Encounter  Emergency Medicine Resident     Pt Name: Holly Poole  PUM:[id number]  [place] 1995  Date of evaluation: 10/5/21  PCP:  JP Ojeda CNP    CHIEF COMPLAINT       Chief Complaint   Patient presents with    Other     needs midline placement for fluid admin @ home; sent by OB: 12 weeks preg     HISTORY OF PRESENT ILLNESS  (Location/Symptom, Timing/Onset, Context/Setting, Quality, Duration, ModifyingFactors, Severity.)      Holly Poole is a 27 y.o. female with PMH of symptomatic cholelithiasis hyperemesis  who is pregnant 12 weeks gestation. Sent by OB for midline due to chronic vomiting. Is getting IV fluids at home but only able to have a nurse come every few days and is symptomatic between infusions. Patient has Zofran pump. No acute complaints. Chronic nausea/vomiting. No headache, chest pain, shortness of breath, fever, chills, abdominal pain, hematemesis, vaginal bleeding currently. Patient is also following with surgery who is attempting to postpone laparoscopic cholecystectomy until second trimester due to lower risk. PAST MEDICAL / SURGICAL / SOCIAL /FAMILY HISTORY      has a past medical history of Pulmonary valve stenosis. No other pertinent PMH on review with patient/guardian. has a past surgical history that includes Cardiac valuve replacement; Cardiac catheterization; Vulva surgery; IR NONTUNNELED VASCULAR CATHETER > 5 YEARS (2020); and  section (N/A, 2020). No other pertinent PSH on review with patient/guardian.   Social History     Socioeconomic History    Marital status:      Spouse name: Not on file    Number of children: Not on file    Years of education: Not on file    Highest education level: Not on file   Occupational History    Not on file   Tobacco Use    Smoking status: Never Smoker    Smokeless tobacco: Never Used   Vaping Use    Vaping Use: Never used   Substance and Sexual Activity    Alcohol use: Not Currently    Drug use: Never    Sexual activity: Yes     Partners: Male   Other Topics Concern    Not on file   Social History Narrative    Not on file     Social Determinants of Health     Financial Resource Strain: Low Risk     Difficulty of Paying Living Expenses: Not hard at all   Food Insecurity: No Food Insecurity    Worried About Running Out of Food in the Last Year: Never true    [address] in the Last Year: Never true   Transportation Needs:     Lack of Transportation (Medical):  Lack of Transportation (Non-Medical):    Physical Activity:     Days of Exercise per Week:     Minutes of Exercise per Session:    Stress:     Feeling of Stress :    Social Connections:     Frequency of Communication with Friends and Family:     Frequency of Social Gatherings with Friends and Family:     Attends [employer] Services:     Active Member of Clubs or Organizations:     Attends Club or Organization Meetings:     Marital Status:    Intimate Partner Violence:     Fear of Current or Ex-Partner:     Emotionally Abused:     Physically Abused:     Sexually Abused:        I counseled the patient against using tobacco products. Family History   Problem Relation Age of Onset    Other Mother         Lupus     No other pertinent FamHx on review with patient/guardian. Allergies:  Patient has no known allergies. Home Medications:  Prior to Admission medications    Medication Sig Start Date End Date Taking?  Authorizing Provider   ondansetron (ZOFRAN ODT) 4 MG disintegrating tablet Place 1 tablet under the tongue every 8 hours as needed for Nausea or Vomiting 9/7/21  Yes JP Obrien - CNP   vitamin D3 (CHOLECALCIFEROL) 25 MCG (1000 UT) TABS tablet Take 1 tablet by mouth daily 4/26/21  Yes JP Ventura - CNP   ibuprofen (ADVIL;MOTRIN) 600 MG tablet Take 1 tablet by mouth every 6 hours as needed for Pain 12/28/20  Yes Richard Robins,    Prenatal Vit-Fe Fumarate-FA (PRENATAL PO) Take by mouth   Yes Historical Provider, MD   [facility] [place] [facility]. Devices (BREAST PUMP) [facility] Double electric breast pump 9/29/20   Sushma Fermin APRN - CNP       REVIEW OF SYSTEMS    (2-9 systems for level 4, 10 ormore for level 5)      Review of Systems   Constitutional: Negative for chills and fever. Eyes: Negative for visual disturbance. Respiratory: Negative for shortness of breath. Cardiovascular: Negative for chest pain. Gastrointestinal: Positive for nausea and vomiting. Negative for abdominal pain, blood in stool, constipation and diarrhea. Genitourinary: Negative for dysuria, frequency, urgency, vaginal bleeding and vaginal discharge. Skin: Negative for rash. Allergic/Immunologic: Negative for immunocompromised state. Neurological: Negative for headaches. Hematological: Does not bruise/bleed easily. PHYSICAL EXAM   (up to 7 for level 4, 8 or more for level 5)      INITIAL VITALS:   /74   Pulse 84   Temp 97.7 °F (36.5 °C) (Oral)   Resp 14   Ht 5' 4\" (1.626 m)   Wt 237 lb (107.5 kg)   LMP 07/07/2021   SpO2 100%   BMI 40.68 kg/m²     Physical Exam  Constitutional:       General: She is not in acute distress. Appearance: Normal appearance. She is not ill-appearing, toxic-appearing or diaphoretic. HENT:      Head: Normocephalic and atraumatic. Right Ear: External ear normal.      Left Ear: External ear normal.   Eyes:      General:         Right eye: No discharge. Left eye: No discharge. Cardiovascular:      Rate and Rhythm: Normal rate and regular rhythm. Pulses: Normal pulses. Heart sounds: No murmur heard. Pulmonary:      Effort: Pulmonary effort is normal. No respiratory distress. Breath sounds: Normal breath sounds. No wheezing, rhonchi or rales. Abdominal:      Palpations: Abdomen is soft. Tenderness: There is no abdominal tenderness.  There is no guarding or rebound. Comments: Zofran pump   Skin:     Capillary Refill: Capillary refill takes less than 2 seconds. Neurological:      General: No focal deficit present. Mental Status: She is alert. DIFFERENTIAL  DIAGNOSIS     PLAN (LABS / IMAGING / EKG):  Orders Placed This Encounter   Procedures    Inpatient consult to Obstetrics / Gynecology    PATIENT STATUS (FROM ED OR OR/PROCEDURAL) Observation       MEDICATIONS ORDERED:  No orders of the defined types were placed in this encounter. DIAGNOSTIC RESULTS / EMERGENCY DEPARTMENT COURSE / MDM     LABS:  No results found for this visit on 10/05/21. IMPRESSION/MDM/ED COURSE:  27 y.o. female at 16 weeks gestation presented with hyperemesis . Sent by OB for midline. No acute complaints, do not feel that work-up is necessary at this time. Patient well-appearing, moist mucous membranes, abdomen soft and nontender. Will discuss with OB as PICC team is not available currently. ED Course as of Oct 05 2125   Tue Oct 05, 2021   2105 Spoke to [facility] who will discuss admission versus outpatient midline with patient.    [AF]    Patient seen by OB who will admit patient to observation for midline placement tomorrow. [AF]      ED Course User Index  [AF] Stephany Roque DO     Patient/Guardian requesting discharge. Patient/Guardian was given written and verbal instructions prior to discharge. Patient/Guardian understood and agreed. Patient/Guardian had no further questions. RADIOLOGY:  No orders to display       EKG  None  All EKG's are interpreted by the Emergency Department Physician who either signs or Co-signs this chart in the absence of a cardiologist.    PROCEDURES:  None    CONSULTS:  IP CONSULT TO OB GYN    FINAL IMPRESSION      1. Hyperemesis          DISPOSITION / PLAN     DISPOSITION Admitted 10/05/2021 09:20:25 PM      PATIENT REFERREDTO:  No follow-up provider specified.     DISCHARGE MEDICATIONS:  New Prescriptions    No

## 2021-10-13 ENCOUNTER — PATIENT MESSAGE (OUTPATIENT)
Dept: OBGYN CLINIC | Age: 26
End: 2021-10-13

## 2021-10-13 NOTE — TELEPHONE ENCOUNTER
From: Birgit Arredondo  To: Richard Sommer DO  Sent: 10/13/2021 8:51 AM EDT  Subject: Non-Urgent Stacey Cronin Monday,     I am reaching out to ask for your opinion on receiving the covid vaccine while pregnant. I know the CDC recommends it and now my work is requiring it, but I wanted to ask your perspective before we make a decision. Feel free to give me a call to discuss further! Thank you in advance!      Nilsa Arredondo   [phone]

## 2021-10-27 ENCOUNTER — ROUTINE PRENATAL (OUTPATIENT)
Dept: OBGYN CLINIC | Age: 26
End: 2021-10-27

## 2021-10-27 VITALS
BODY MASS INDEX: 41.61 KG/M2 | DIASTOLIC BLOOD PRESSURE: 70 MMHG | WEIGHT: 242.44 LBS | SYSTOLIC BLOOD PRESSURE: 122 MMHG

## 2021-10-27 DIAGNOSIS — Z34.82 NORMAL PREGNANCY IN MULTIGRAVIDA IN SECOND TRIMESTER: ICD-10-CM

## 2021-10-27 DIAGNOSIS — Z3A.14 14 WEEKS GESTATION OF PREGNANCY: Primary | ICD-10-CM

## 2021-10-27 PROCEDURE — 0502F SUBSEQUENT PRENATAL CARE: CPT | Performed by: OBSTETRICS & GYNECOLOGY

## 2021-11-01 NOTE — PROGRESS NOTES
Dax Babb is a 27 y.o. female 14w6d    Gloriabel Osorio    OB History    Para Term  AB Living   2 1   1   1   SAB TAB Ectopic Molar Multiple Live Births           0 1      # Outcome Date GA Lbr Agustín/2nd Weight Sex Delivery Anes PTL Lv   2 Current            1  20 35w1d  4 lb 5.5 oz (1.97 kg) F CS-LTranv Spinal Y HERMELINDA             Vitals  BP: 122/70  Weight: 242 lb 7 oz (110 kg)  Patient Position: Sitting  Fetal Heart Rate: 155      No VB  NO LOF  NO CTX  States nausea is slightly improving      Hx of C/S x 1  will need fetal echocardiogram  Cardio referral, history of pulmonary stenosis- states follow [facility]  Cholelithiasis- following Dr. Shandra Reyes surgeon seen 21  Blood Type A negative  Declined FTS, interested in NIPS testing- info given  Hx of gestational hypertension  not immune to varicella          Assessment:  1. Dax Babb is a 27 y.o. female  2.   3. 14w6d    Patient Active Problem List    Diagnosis Date Noted    Hyperemesis 10/05/2021    Short interval between pregnancies affecting pregnancy in first trimester, antepartum 2021    Symptomatic cholelithiasis 2021    BMI 44.80 2020    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 Pt desires care with different  [facility] records/ultrasound/labs  from previous provider from [place].  scanned into media. Pt moved from       Hx Pulmonary Stenosis s/p Valvuloplasty  2020     Dx at 2 and underwent a balloon valvuloplasty at age 1 years, performed by Payal Zavala at [facility]  In [place]. Most recent visit to peds cardiology scanned into [employer]          Diagnosis Orders   1. 14 weeks gestation of pregnancy  Sahra De Santiago MD, Maternal Fetal Medicine, [facility]   2.  Normal pregnancy in multigravida in second trimester  Sahra De Santiago MD, Maternal Fetal Medicine, [facility]         Plan:  RTO 4 weeks routine OBV  Consult to [facility] for anatomy US at 18-22 weeks

## 2021-11-15 ENCOUNTER — HOSPITAL ENCOUNTER (EMERGENCY)
Age: 26
Discharge: HOME | End: 2021-11-15
Attending: EMERGENCY MEDICINE
Payer: COMMERCIAL

## 2021-11-15 VITALS
TEMPERATURE: 97.6 F | BODY MASS INDEX: 40.8 KG/M2 | RESPIRATION RATE: 16 BRPM | SYSTOLIC BLOOD PRESSURE: 117 MMHG | HEIGHT: 64 IN | HEART RATE: 91 BPM | WEIGHT: 239 LBS | DIASTOLIC BLOOD PRESSURE: 77 MMHG | OXYGEN SATURATION: 99 %

## 2021-11-15 DIAGNOSIS — T80.219A PICC LINE INFECTION, INITIAL ENCOUNTER: Primary | ICD-10-CM

## 2021-11-15 PROCEDURE — 99282 EMERGENCY DEPT VISIT SF MDM: CPT

## 2021-11-15 PROCEDURE — 6370000000 HC RX 637 (ALT 250 FOR IP): Performed by: EMERGENCY MEDICINE

## 2021-11-15 PROCEDURE — 87070 CULTURE OTHR SPECIMN AEROBIC: CPT

## 2021-11-15 PROCEDURE — 87205 SMEAR GRAM STAIN: CPT

## 2021-11-15 RX ORDER — CEPHALEXIN 250 MG/1
500 CAPSULE ORAL ONCE
Status: COMPLETED | OUTPATIENT
Start: 2021-11-15 | End: 2021-11-15

## 2021-11-15 RX ORDER — CEPHALEXIN 500 MG/1
500 CAPSULE ORAL 4 TIMES DAILY
Qty: 40 CAPSULE | Refills: 0 | Status: SHIPPED | OUTPATIENT
Start: 2021-11-15 | End: 2021-11-25

## 2021-11-15 RX ADMIN — CEPHALEXIN 500 MG: 250 CAPSULE ORAL at 14:11

## 2021-11-15 NOTE — ED NOTES
Patient provided with discharge instructions, prescriptions, and follow up information. Verbalized understanding. No IV access to discontinue. A&OX3. Steady gait noted at discharge. Wheelchair declined by patient.       Fani Matamoros RN  11/15/21 3658

## 2021-11-15 NOTE — ED PROVIDER NOTES
[id number] [facility] ED  [address] [address] [facility] [address]  Phone: [phone]  Fax: [place] [phone]      Pt Name: Antonina Jackson  MRN: [id number]  Kailyn 1995  Date of evaluation: 11/15/2021    CHIEF COMPLAINT       Chief Complaint   Patient presents with    Vascular Access Problem     belives that her picc line is infected, has hyperemisis gravidum  but now tolertating fluids, OB said to get the line out because of infection and no longer needing it       HISTORY OF PRESENT ILLNESS    Lexus Arredondo is a 27 y.o. female who presents to the emergency room with PICC line infection. Was suffering from hyperemesis had a PICC line placed about a month ago. Dressing was changed on Tuesday and then started to have some redness again on Thursday along with a little greenish drainage. Today was told to come to the emergency department for removal and possible antibiotics. Patient is 18 weeks pregnant. She is no longer suffering from hyperemesis. Tolerating fluids no vomiting. Denies any other symptoms. REVIEW OF SYSTEMS       Constitutional: No fevers or chills   HEENT: No sore throat, rhinorrhea, or earache   Eyes: No blurry vision or double vision no drainage   Cardiovascular: No chest pain or tachycardia   Respiratory: No wheezing or shortness of breath no cough   Gastrointestinal: No nausea, vomiting, diarrhea, constipation, or abdominal pain   : No hematuria or dysuria   Musculoskeletal: Left arm redness no pain  Skin: No rash   Neurological: No focal neurologic complaints, paresthesias, weakness, or headache     PAST MEDICAL HISTORY    has a past medical history of Pulmonary valve stenosis. SURGICAL HISTORY      has a past surgical history that includes Cardiac valuve replacement; Cardiac catheterization; Vulva surgery; IR NONTUNNELED VASCULAR CATHETER > 5 YEARS (2020); and  section (N/A, 2020).     CURRENT MEDICATIONS Previous Medications    IBUPROFEN (ADVIL;MOTRIN) 600 MG TABLET    Take 1 tablet by mouth every 6 hours as needed for Pain    MISC. DEVICES (BREAST PUMP) MISC    Double electric breast pump    ONDANSETRON (ZOFRAN ODT) 4 MG DISINTEGRATING TABLET    Place 1 tablet under the tongue every 8 hours as needed for Nausea or Vomiting    PRENATAL VIT-FE FUMARATE-FA (PRENATAL PO)    Take by mouth    VITAMIN D3 (CHOLECALCIFEROL) 25 MCG (1000 UT) TABS TABLET    Take 1 tablet by mouth daily       ALLERGIES     has No Known Allergies. FAMILY HISTORY     She indicated that the status of her mother is unknown.     family history includes Other in her mother. SOCIAL HISTORY      reports that she has never smoked. She has never used smokeless tobacco. She reports previous alcohol use. She reports that she does not use drugs. PHYSICAL EXAM       ED Triage Vitals [11/15/21 1348]   BP Temp Temp Source Pulse Resp SpO2 Height Weight   117/77 97.6 °F (36.4 °C) Oral 91 16 99 % 5' 4\" (1.626 m) 239 lb (108.4 kg)       Constitutional: Alert, oriented x3, nontoxic, answering questions appropriately, acting properly for age, in no acute distress   HEENT: Extraocular muscles intact,  Neck: Trachea midline   Cardiovascular: Regular rhythm and rate no murmurs   Respiratory: Clear to auscultation bilaterally no wheezes, rhonchi, rales, no respiratory distress no tachypnea no retractions no hypoxia  Gastrointestinal: Soft, nontender, nondistended, positive bowel sounds. No rebound, rigidity, or guarding. Musculoskeletal: Left upper extremity there is a PICC line in place left biceps area with an area of erythema around the catheter insertion site that is slightly swollen. Purulent drainage mild. No lymphangitis. No fluctuance. Neurologic: Moving all 4 extremities without difficulty there are no gross focal neurologic deficits   Skin: Warm and dry       DIFFERENTIAL DIAGNOSIS/ MDM:     PICC line removal and antibiotics.     DIAGNOSTIC RESULTS     EKG: All EKG's are interpreted by the Emergency Department Physician who either signs or Co-signs this chart in the absence of a cardiologist.        Not indicated unless otherwise documented above    LABS:  No results found for this visit on 11/15/21. Not indicated unless otherwise documented above    RADIOLOGY:   I reviewed the radiologist interpretations:    No orders to display       Not indicated unless otherwise documented above    EMERGENCY DEPARTMENT COURSE:     The patient was given the following medications:  Orders Placed This Encounter   Medications    cephALEXin (KEFLEX) capsule 500 mg     Order Specific Question:   Antimicrobial Indications     Answer:   Skin and Soft Tissue Infection    cephALEXin (KEFLEX) 500 MG capsule     Sig: Take 1 capsule by mouth 4 times daily for 10 days     Dispense:  40 capsule     Refill:  0        Vitals:   -------------------------  /77   Pulse 91   Temp 97.6 °F (36.4 °C) (Oral)   Resp 16   Ht 5' 4\" (1.626 m)   Wt 108.4 kg (239 lb)   LMP 07/07/2021   SpO2 99%   BMI 41.02 kg/m²     Antibiotics and close follow-up. Return if worsening symptoms or other concerns including worsening pain swelling redness, red streaks up your arm fevers or any other problems. The patient understands that at this time there is no evidence for a more malignant underlying process, but also understands that early in the process of an illness or injury, an emergency department workup can be falsely reassuring. Routine discharge counseling was given, and it is understood that worsening, changing or persistent symptoms should prompt an immediate call or follow up with their primary physician or return to the emergency department. The importance of appropriate follow up was also discussed. I have reviewed the disposition diagnosis. I have answered the questions and given discharge instructions.   There was voiced understanding of these instructions and no further questions or complaints. CRITICAL CARE:    None    CONSULTS:    None    PROCEDURES:    Dressing around PICC line removed and PICC line pulled without difficulty. 48 cm. Band-Aid applied. OARRS Report if indicated             FINAL IMPRESSION      1. PICC line infection, initial encounter          DISPOSITION/PLAN   DISPOSITION Decision To Discharge 11/15/2021 02:04:30 PM        CONDITION ON DISPOSITION: STABLE       PATIENT REFERRED TO:  JP Matos - CNP  [address]  Dr. Riley Gonzalez [address] [place] [address] 36.  [phone]    Schedule an appointment as soon as possible for a visit in 2 days        DISCHARGE MEDICATIONS:  New Prescriptions    CEPHALEXIN (KEFLEX) 500 MG CAPSULE    Take 1 capsule by mouth 4 times daily for 10 days       (Please note that portions of this note were completed with a voice recognition program.  Efforts were made to edit the dictations but occasionally words are mis-transcribed.)    Juan Marques DO   Attending Emergency Physician     Juan Marques DO  11/15/21 [id number]

## 2021-11-16 LAB
CULTURE: NO GROWTH
DIRECT EXAM: NORMAL
DIRECT EXAM: NORMAL
Lab: NORMAL
SPECIMEN DESCRIPTION: NORMAL

## 2021-11-24 ENCOUNTER — ROUTINE PRENATAL (OUTPATIENT)
Dept: OBGYN CLINIC | Age: 26
End: 2021-11-24

## 2021-11-24 VITALS — BODY MASS INDEX: 42.05 KG/M2 | SYSTOLIC BLOOD PRESSURE: 118 MMHG | WEIGHT: 245 LBS | DIASTOLIC BLOOD PRESSURE: 64 MMHG

## 2021-11-24 DIAGNOSIS — Z3A.18 18 WEEKS GESTATION OF PREGNANCY: ICD-10-CM

## 2021-11-24 DIAGNOSIS — O09.892 SHORT INTERVAL BETWEEN PREGNANCIES AFFECTING PREGNANCY IN SECOND TRIMESTER, ANTEPARTUM: ICD-10-CM

## 2021-11-24 DIAGNOSIS — Z34.82 NORMAL PREGNANCY IN MULTIGRAVIDA IN SECOND TRIMESTER: Primary | ICD-10-CM

## 2021-11-24 PROCEDURE — 0502F SUBSEQUENT PRENATAL CARE: CPT | Performed by: OBSTETRICS & GYNECOLOGY

## 2021-11-24 NOTE — PROGRESS NOTES
Alexandru Gilmore is a 27 y.o. female 18w6d    Cuca Prior    OB History    Para Term  AB Living   2 1   1   1   SAB IAB Ectopic Molar Multiple Live Births           0 1      # Outcome Date GA Lbr Agustín/2nd Weight Sex Delivery Anes PTL Lv   2 Current            1  20 35w1d  4 lb 5.5 oz (1.97 kg) F CS-LTranv Spinal Y HERMELINDA       Vitals  BP: 118/64  Weight: 245 lb (111.1 kg)  Patient Position: Sitting  Albumin: Negative  Glucose: Negative    The patient was seen and evaluated. There was positive fetal movements. No contractions or leakage of fluid. Signs and symptoms of  labor as well as labor were reviewed. The Nuchal Translucency testing was reviewed and found to be counseled and declined. A single marker MSAFP was reviewed and found to be counseled and declined. The patients anatomy ultrasound has been completed and reviewed with patient. The S/S of Pre-Eclampsia were reviewed with the patient in detail. She is to report any of these if they occur. She currently denies any of these. The patient is RH negative Rhogam Ordered no, to be ordered for 28 wks        Hx of C/S x 1  will need fetal echocardiogram  Cardio referral, history of pulmonary stenosis- states follow TCC  Cholelithiasis- following Dr. Clarke Roberts surgeon seen 19  Blood Type A negative  Declined FTS, interested in NIPS testing- info given  Hx of gestational hypertension  not immune to varicella        Assessment:  1. Alexandru Gilmore is a 27 y.o. female  2.   3. 18w6d    Patient Active Problem List    Diagnosis Date Noted    Hyperemesis 10/05/2021    Short interval between pregnancies affecting pregnancy in first trimester, antepartum 2021    Symptomatic cholelithiasis 2021    BMI 44.80 2020    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  Pt desires care with different  [facility] records/ultrasound/labs  from previous provider from [address] [place] [address].  scanned into media. Pt moved from       Hx Pulmonary Stenosis s/p Valvuloplasty  07/14/2020     Dx at 2 and underwent a balloon valvuloplasty at age 1 years, performed by Wyatt Hernandez at [facility]  In [address] [place] [address]. Most recent visit to peds cardiology scanned into [employer]          Diagnosis Orders   1. Normal pregnancy in multigravida in second trimester     2. 18 weeks gestation of pregnancy     3. Short interval between pregnancies affecting pregnancy in second trimester, antepartum           Plan:  The patient will return to the office for her next visit in 4 weeks. See antepartum flow sheet. M anatomy ultrasound scheduled  N/V improving. PICC removed, pump running - managed through [employer]        Patient was seen with total face to face time of 20 minutes. More than 50% of this visit was on counseling and education regarding her    Diagnosis Orders   1. Normal pregnancy in multigravida in second trimester     2. 18 weeks gestation of pregnancy     3. Short interval between pregnancies affecting pregnancy in second trimester, antepartum      and her options. She was also counseled on her preventative health maintenance recommendations and follow-up.

## 2021-12-08 ENCOUNTER — HOSPITAL ENCOUNTER (OUTPATIENT)
Age: 26
Discharge: HOME | End: 2021-12-08
Payer: COMMERCIAL

## 2021-12-08 ENCOUNTER — ROUTINE PRENATAL (OUTPATIENT)
Dept: PERINATAL CARE | Age: 26
End: 2021-12-08
Payer: COMMERCIAL

## 2021-12-08 VITALS
TEMPERATURE: 97.1 F | BODY MASS INDEX: 42.23 KG/M2 | WEIGHT: 246 LBS | HEART RATE: 92 BPM | RESPIRATION RATE: 16 BRPM | SYSTOLIC BLOOD PRESSURE: 124 MMHG | DIASTOLIC BLOOD PRESSURE: 82 MMHG

## 2021-12-08 DIAGNOSIS — O99.891 MATERNAL CONGENITAL CARDIAC ANOMALY AFFECTING PREGNANCY IN SECOND TRIMESTER, ANTEPARTUM: Primary | ICD-10-CM

## 2021-12-08 DIAGNOSIS — Q24.9 MATERNAL CONGENITAL CARDIAC ANOMALY AFFECTING PREGNANCY IN SECOND TRIMESTER, ANTEPARTUM: Primary | ICD-10-CM

## 2021-12-08 DIAGNOSIS — Z36.86 ENCOUNTER FOR SCREENING FOR RISK OF PRE-TERM LABOR: ICD-10-CM

## 2021-12-08 DIAGNOSIS — Z3A.20 20 WEEKS GESTATION OF PREGNANCY: ICD-10-CM

## 2021-12-08 DIAGNOSIS — O35.2XX0 HEREDITARY FAMILIAL DISEASE AFFECTING MANAGEMENT OF MOTHER AND POSSIBLY AFFECTING FETUS, ANTEPARTUM, SINGLE OR UNSPECIFIED FETUS: ICD-10-CM

## 2021-12-08 DIAGNOSIS — O34.219 PREVIOUS CESAREAN DELIVERY, ANTEPARTUM CONDITION OR COMPLICATION: ICD-10-CM

## 2021-12-08 DIAGNOSIS — Z86.718 HISTORY OF DEEP VENOUS THROMBOSIS: ICD-10-CM

## 2021-12-08 DIAGNOSIS — O09.899 SHORT INTERVAL BETWEEN PREGNANCIES COMPLICATING PREGNANCY, ANTEPARTUM: ICD-10-CM

## 2021-12-08 DIAGNOSIS — O99.212 OBESITY AFFECTING PREGNANCY IN SECOND TRIMESTER: ICD-10-CM

## 2021-12-08 LAB
ABDOMINAL CIRCUMFERENCE: NORMAL
ABDOMINAL CIRCUMFERENCE: NORMAL
BIPARIETAL DIAMETER: NORMAL
BIPARIETAL DIAMETER: NORMAL
ESTIMATED FETAL WEIGHT: NORMAL
ESTIMATED FETAL WEIGHT: NORMAL
FEMORAL DIAMETER: NORMAL
FEMORAL DIAMETER: NORMAL
HC/AC: NORMAL
HC/AC: NORMAL
HEAD CIRCUMFERENCE: NORMAL
HEAD CIRCUMFERENCE: NORMAL
HOMOCYSTEINE: 5.9 UMOL/L

## 2021-12-08 PROCEDURE — 85613 RUSSELL VIPER VENOM DILUTED: CPT

## 2021-12-08 PROCEDURE — 85303 CLOT INHIBIT PROT C ACTIVITY: CPT

## 2021-12-08 PROCEDURE — 85301 ANTITHROMBIN III ANTIGEN: CPT

## 2021-12-08 PROCEDURE — 36415 COLL VENOUS BLD VENIPUNCTURE: CPT

## 2021-12-08 PROCEDURE — 81240 F2 GENE: CPT

## 2021-12-08 PROCEDURE — 85300 ANTITHROMBIN III ACTIVITY: CPT

## 2021-12-08 PROCEDURE — 85305 CLOT INHIBIT PROT S TOTAL: CPT

## 2021-12-08 PROCEDURE — 83090 ASSAY OF HOMOCYSTEINE: CPT

## 2021-12-08 PROCEDURE — 81241 F5 GENE: CPT

## 2021-12-08 PROCEDURE — 85730 THROMBOPLASTIN TIME PARTIAL: CPT

## 2021-12-08 PROCEDURE — 85306 CLOT INHIBIT PROT S FREE: CPT

## 2021-12-08 PROCEDURE — 81291 MTHFR GENE: CPT

## 2021-12-08 PROCEDURE — 85610 PROTHROMBIN TIME: CPT

## 2021-12-08 PROCEDURE — 76817 TRANSVAGINAL US OBSTETRIC: CPT | Performed by: OBSTETRICS & GYNECOLOGY

## 2021-12-08 PROCEDURE — 86147 CARDIOLIPIN ANTIBODY EA IG: CPT

## 2021-12-08 PROCEDURE — 76811 OB US DETAILED SNGL FETUS: CPT | Performed by: OBSTETRICS & GYNECOLOGY

## 2021-12-08 RX ORDER — ASPIRIN 81 MG/1
81 TABLET ORAL DAILY
COMMUNITY

## 2021-12-08 NOTE — PROGRESS NOTES
Patient declined non-invasive prenatal testing/NIPT (cell-free DNA screening) that is offered to all pregnant patients irrespective of baseline risk or maternal age (Obstet [de-identified] 26; 80; ACOG Practice Bulletin Number 226, Screening for Fetal Chromosomal Abnormalities). Patient has declined non-invasive prenatal diagnosis testing (with the [place] Complete test from [employer]) today. Patient has also declined the Five Gene Carrier Screen (with the [place] test from [address] [place] [address]) today. MSAFP (maternal serum alpha-feto protein level) lab draw declined by patient. I would advise continuation of daily oral baby aspirin 81 mg based on guidelines by the USPSTF/ACOG (for preeclampsia prevention for pregnant women at \"high-risk\"  for preeclampsia). Patient sent for laboratory evaluation for acquired/inherited thrombophilias (testing for antiphospholipid antibody syndrome, Factor V Leiden mutation, Prothrombin gene mutation, MTHFR 677/1298 mutation, etc) given personal medical history.

## 2021-12-09 ENCOUNTER — ROUTINE PRENATAL (OUTPATIENT)
Dept: PERINATAL CARE | Age: 26
End: 2021-12-09
Payer: COMMERCIAL

## 2021-12-09 VITALS
TEMPERATURE: 97.2 F | HEART RATE: 109 BPM | BODY MASS INDEX: 42 KG/M2 | RESPIRATION RATE: 16 BRPM | WEIGHT: 246 LBS | DIASTOLIC BLOOD PRESSURE: 82 MMHG | SYSTOLIC BLOOD PRESSURE: 122 MMHG | HEIGHT: 64 IN

## 2021-12-09 DIAGNOSIS — Z3A.21 21 WEEKS GESTATION OF PREGNANCY: ICD-10-CM

## 2021-12-09 DIAGNOSIS — O35.2XX0 HEREDITARY FAMILIAL DISEASE AFFECTING MANAGEMENT OF MOTHER AND POSSIBLY AFFECTING FETUS, ANTEPARTUM, SINGLE OR UNSPECIFIED FETUS: ICD-10-CM

## 2021-12-09 DIAGNOSIS — K80.20: ICD-10-CM

## 2021-12-09 DIAGNOSIS — Z86.718 HISTORY OF BLOOD CLOTS: Primary | ICD-10-CM

## 2021-12-09 DIAGNOSIS — Z86.718 HISTORY OF DEEP VENOUS THROMBOSIS: Primary | ICD-10-CM

## 2021-12-09 DIAGNOSIS — O26.612: ICD-10-CM

## 2021-12-09 DIAGNOSIS — O99.891 MATERNAL CONGENITAL CARDIAC ANOMALY AFFECTING PREGNANCY IN SECOND TRIMESTER, ANTEPARTUM: ICD-10-CM

## 2021-12-09 DIAGNOSIS — O99.212 OBESITY AFFECTING PREGNANCY IN SECOND TRIMESTER: ICD-10-CM

## 2021-12-09 DIAGNOSIS — O09.899 SHORT INTERVAL BETWEEN PREGNANCIES COMPLICATING PREGNANCY, ANTEPARTUM: ICD-10-CM

## 2021-12-09 DIAGNOSIS — Z34.82 NORMAL PREGNANCY IN MULTIGRAVIDA IN SECOND TRIMESTER: ICD-10-CM

## 2021-12-09 DIAGNOSIS — Q24.9 MATERNAL CONGENITAL CARDIAC ANOMALY AFFECTING PREGNANCY IN SECOND TRIMESTER, ANTEPARTUM: ICD-10-CM

## 2021-12-09 LAB — AT-III ACTIVITY: 81 % (ref 83–122)

## 2021-12-09 PROCEDURE — 99213 OFFICE O/P EST LOW 20 MIN: CPT | Performed by: OBSTETRICS & GYNECOLOGY

## 2021-12-11 LAB
MTHFR INTERPRETATION: ABNORMAL
MTHFR MUTATION A1286C: ABNORMAL
MTHFR MUTATION C665T: NEGATIVE
SPECIMEN: ABNORMAL

## 2021-12-13 LAB
PROTHROMBIN G20210A MUTATION: ABNORMAL
PT PCR SPECIMEN: ABNORMAL

## 2021-12-14 LAB
ANTI-THROMBIN 3 AG: 60 % (ref 82–136)
FACTOR V MUTATION: NEGATIVE
PROTEIN S ACTIVITY: 53 % (ref 59–130)
SPECIMEN: NORMAL

## 2021-12-17 LAB
ANTICARDIOLIPIN IGA ANTIBODY: 2.8 APL (ref 0–14)
ANTICARDIOLIPIN IGG ANTIBODY: 1.1 GPL (ref 0–10)
CARDIOLIPIN AB IGM: 1 MPL (ref 0–10)
DILUTE RUSSELL VIPER VENOM TIME: NORMAL
INR BLD: 0.9
LUPUS ANTICOAG: NORMAL
PARTIAL THROMBOPLASTIN TIME: 24.9 SEC (ref 20.5–30.5)
PROTEIN C ACTIVITY: 116 %
PROTHROMBIN TIME: 9.6 SEC (ref 9.1–12.3)

## 2021-12-18 LAB — PROTEIN S ANTIGEN, TOTAL: 72 % (ref 63–126)

## 2021-12-19 LAB — PROTEIN S ANTIGEN, FREE: 36 % (ref 55–123)

## 2021-12-30 ENCOUNTER — ROUTINE PRENATAL (OUTPATIENT)
Dept: OBGYN CLINIC | Age: 26
End: 2021-12-30

## 2021-12-30 VITALS — WEIGHT: 247.2 LBS | SYSTOLIC BLOOD PRESSURE: 118 MMHG | BODY MASS INDEX: 42.43 KG/M2 | DIASTOLIC BLOOD PRESSURE: 72 MMHG

## 2021-12-30 DIAGNOSIS — Z3A.28 28 WEEKS GESTATION OF PREGNANCY: Primary | ICD-10-CM

## 2021-12-30 PROCEDURE — 0502F SUBSEQUENT PRENATAL CARE: CPT | Performed by: OBSTETRICS & GYNECOLOGY

## 2022-01-01 NOTE — FLOWSHEET NOTE
Writer to bedside. Pt sitting up on the couch with FOB. Pt denies pain, contractions or LOF/bleeding. +FM reported. Pt instructed to call out when she is ready to resume monitoring. Pt agreeable. Statement Selected

## 2022-01-03 NOTE — PROGRESS NOTES
Kristin Lieberman is a 27 y.o. female 18w0d        OB History    Para Term  AB Living   2 1   1   1   SAB IAB Ectopic Molar Multiple Live Births           0 1      # Outcome Date GA Lbr Agustín/2nd Weight Sex Delivery Anes PTL Lv   2 Current            1  20 35w1d  4 lb 5.5 oz (1.97 kg) F CS-LTranv Spinal Y HERMELINDA       Vitals  BP: 118/72  Weight: 247 lb 3.2 oz (112.1 kg)  Patient Position: Sitting  Albumin: Negative  Glucose: Negative  Fetal Heart Rate: 146  Movement: Present    + FM  NO VB  NO LOF  NO CTX  No complaints or issues  Is contemplating TOLAC - wants to know risks associated with TOLAC vs repeat C/S.      Hx of C/S x 1  will need fetal echocardiogram  Cardio referral, history of pulmonary stenosis- states follow [facility]  Cholelithiasis- following Dr. [facility] surgeon seen 21  Blood Type A negative  Declined FTS, interested in NIPS testing- info given  Hx of gestational hypertension  not immune to varicella      Assessment:  1. Kristin Lieberman is a 27 y.o. female  2.   3. 24w0d    Patient Active Problem List    Diagnosis Date Noted    Hyperemesis 10/05/2021    Short interval between pregnancies affecting pregnancy in first trimester, antepartum 2021    Symptomatic cholelithiasis 2021    BMI 44.80 2020    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 Pt desires care with different  [facility] records/ultrasound/labs  from previous provider from [address] [place] [address].  scanned into media. Pt moved from       Hx Pulmonary Stenosis s/p Valvuloplasty  2020     Dx at 2 and underwent a balloon valvuloplasty at age 1 years, performed by Barry Fenton at [facility]  In [address] [place] [address]. Most recent visit to peds cardiology scanned into [employer]           Plan:  The patient will return to the office for her next visit in 4 weeks. See antepartum flow sheet.    28 wk labs ordered  Will need rhogam next visit    Discussed risks associated with TOLAC: Patient has a 39.9% chance of vaginal delivery with calculated risk assessment. We discussed associated risks with attempt of vaginal delivery: maternal hemorrhage, infection, emergency operative injury, thromboembolism, uterine rupture (1%) and hysterectomy, and associated maternal and fetal death. Patient will discuss her contemplation of TOLAC with  and discuss further with more questions at her upcoming visits.

## 2022-01-04 ENCOUNTER — TELEPHONE (OUTPATIENT)
Dept: OBGYN CLINIC | Age: 27
End: 2022-01-04

## 2022-01-04 DIAGNOSIS — Z3A.24 24 WEEKS GESTATION OF PREGNANCY: Primary | ICD-10-CM

## 2022-01-04 RX ORDER — BREAST PUMP
EACH MISCELLANEOUS
Qty: 1 EACH | Refills: 0 | Status: SHIPPED | OUTPATIENT
Start: 2022-01-04

## 2022-01-04 NOTE — TELEPHONE ENCOUNTER
Beth Connor, [place] 94D9S, on the nurse line VM requesting a rx for a breast pump so she can order.     Order pended

## 2022-01-19 ENCOUNTER — TELEPHONE (OUTPATIENT)
Dept: PERINATAL CARE | Age: 27
End: 2022-01-19

## 2022-01-19 ENCOUNTER — ROUTINE PRENATAL (OUTPATIENT)
Dept: PERINATAL CARE | Age: 27
End: 2022-01-19
Payer: COMMERCIAL

## 2022-01-19 VITALS
BODY MASS INDEX: 42.68 KG/M2 | RESPIRATION RATE: 16 BRPM | SYSTOLIC BLOOD PRESSURE: 137 MMHG | WEIGHT: 250 LBS | HEIGHT: 64 IN | DIASTOLIC BLOOD PRESSURE: 75 MMHG | HEART RATE: 105 BPM | TEMPERATURE: 97.2 F

## 2022-01-19 DIAGNOSIS — O99.282 METHYLENETETRAHYDROFOLATE REDUCTASE DEFICIENCY AFFECTING PREGNANCY IN SECOND TRIMESTER (HCC): ICD-10-CM

## 2022-01-19 DIAGNOSIS — Z3A.26 26 WEEKS GESTATION OF PREGNANCY: ICD-10-CM

## 2022-01-19 DIAGNOSIS — D68.9 PREGNANCY COMPLICATED BY COAGULATION DEFECT IN SECOND TRIMESTER (HCC): Primary | ICD-10-CM

## 2022-01-19 DIAGNOSIS — O99.112 PREGNANCY COMPLICATED BY COAGULATION DEFECT IN SECOND TRIMESTER (HCC): Primary | ICD-10-CM

## 2022-01-19 DIAGNOSIS — O35.2XX0 HEREDITARY FAMILIAL DISEASE AFFECTING MANAGEMENT OF MOTHER AND POSSIBLY AFFECTING FETUS, ANTEPARTUM, SINGLE OR UNSPECIFIED FETUS: ICD-10-CM

## 2022-01-19 DIAGNOSIS — Z86.718 HX OF DEEP VENOUS THROMBOSIS: Primary | ICD-10-CM

## 2022-01-19 DIAGNOSIS — Z36.4 ANTENATAL SCREENING FOR FETAL GROWTH RETARDATION USING ULTRASONICS: ICD-10-CM

## 2022-01-19 DIAGNOSIS — O99.212 OBESITY AFFECTING PREGNANCY IN SECOND TRIMESTER: ICD-10-CM

## 2022-01-19 DIAGNOSIS — D68.52 PROTHROMBIN GENE MUTATION (HCC): ICD-10-CM

## 2022-01-19 DIAGNOSIS — Z86.718 HISTORY OF DEEP VENOUS THROMBOSIS: ICD-10-CM

## 2022-01-19 DIAGNOSIS — E72.12 METHYLENETETRAHYDROFOLATE REDUCTASE DEFICIENCY AFFECTING PREGNANCY IN SECOND TRIMESTER (HCC): ICD-10-CM

## 2022-01-19 DIAGNOSIS — Q24.9 MATERNAL CONGENITAL CARDIAC ANOMALY AFFECTING PREGNANCY IN SECOND TRIMESTER, ANTEPARTUM: ICD-10-CM

## 2022-01-19 DIAGNOSIS — O99.891 MATERNAL CONGENITAL CARDIAC ANOMALY AFFECTING PREGNANCY IN SECOND TRIMESTER, ANTEPARTUM: ICD-10-CM

## 2022-01-19 PROCEDURE — 99215 OFFICE O/P EST HI 40 MIN: CPT | Performed by: OBSTETRICS & GYNECOLOGY

## 2022-01-19 PROCEDURE — 76827 ECHO EXAM OF FETAL HEART: CPT | Performed by: OBSTETRICS & GYNECOLOGY

## 2022-01-19 PROCEDURE — 76825 ECHO EXAM OF FETAL HEART: CPT | Performed by: OBSTETRICS & GYNECOLOGY

## 2022-01-19 PROCEDURE — 76820 UMBILICAL ARTERY ECHO: CPT | Performed by: OBSTETRICS & GYNECOLOGY

## 2022-01-19 PROCEDURE — 76816 OB US FOLLOW-UP PER FETUS: CPT | Performed by: OBSTETRICS & GYNECOLOGY

## 2022-01-19 PROCEDURE — 93325 DOPPLER ECHO COLOR FLOW MAPG: CPT | Performed by: OBSTETRICS & GYNECOLOGY

## 2022-01-19 NOTE — TELEPHONE ENCOUNTER
A Rx called into the pharmacy to [facility]    for Heparin 10,000 units twice a day a 12 week supply. Foltx one pill daily a 12 week supply. Rx called in by NOHEMI BASSETT per Dr. Zenaida Pino.

## 2022-04-27 ENCOUNTER — TELEPHONE (OUTPATIENT)
Dept: PRIMARY CARE CLINIC | Age: 27
End: 2022-04-27
